# Patient Record
Sex: MALE | Race: WHITE | NOT HISPANIC OR LATINO | Employment: OTHER | URBAN - NONMETROPOLITAN AREA
[De-identification: names, ages, dates, MRNs, and addresses within clinical notes are randomized per-mention and may not be internally consistent; named-entity substitution may affect disease eponyms.]

---

## 2022-05-30 ENCOUNTER — HOSPITAL ENCOUNTER (EMERGENCY)
Facility: HOSPITAL | Age: 77
Discharge: HOME | End: 2022-05-30
Attending: EMERGENCY MEDICINE | Admitting: EMERGENCY MEDICINE
Payer: COMMERCIAL

## 2022-05-30 VITALS
DIASTOLIC BLOOD PRESSURE: 91 MMHG | WEIGHT: 241.4 LBS | HEIGHT: 72 IN | OXYGEN SATURATION: 95 % | SYSTOLIC BLOOD PRESSURE: 141 MMHG | TEMPERATURE: 97.7 F | RESPIRATION RATE: 19 BRPM | BODY MASS INDEX: 32.7 KG/M2 | HEART RATE: 98 BPM

## 2022-05-30 DIAGNOSIS — R10.13 EPIGASTRIC PAIN: Primary | ICD-10-CM

## 2022-05-30 DIAGNOSIS — T18.128A FOOD IMPACTION OF ESOPHAGUS, INITIAL ENCOUNTER: ICD-10-CM

## 2022-05-30 DIAGNOSIS — R73.9 HYPERGLYCEMIA: ICD-10-CM

## 2022-05-30 DIAGNOSIS — E86.0 DEHYDRATION: ICD-10-CM

## 2022-05-30 LAB
ALBUMIN SERPL BCP-MCNC: 3.7 G/DL (ref 3.5–5)
ALP SERPL-CCNC: 103 U/L (ref 46–116)
ALT SERPL W P-5'-P-CCNC: 24 U/L (ref 12–78)
ANION GAP SERPL CALCULATED.3IONS-SCNC: 13 MMOL/L (ref 4–13)
AST SERPL W P-5'-P-CCNC: 15 U/L (ref 5–45)
BASE EX.OXY STD BLDV CALC-SCNC: 85.4 % (ref 60–80)
BASE EXCESS BLDV CALC-SCNC: -3.4 MMOL/L
BASOPHILS # BLD AUTO: 0.1 THOUSANDS/ΜL (ref 0–0.1)
BASOPHILS NFR BLD AUTO: 1 % (ref 0–1)
BILIRUB SERPL-MCNC: 2.28 MG/DL (ref 0.2–1)
BUN SERPL-MCNC: 23 MG/DL (ref 5–25)
CALCIUM SERPL-MCNC: 9.2 MG/DL (ref 8.3–10.1)
CHLORIDE SERPL-SCNC: 98 MMOL/L (ref 100–108)
CO2 SERPL-SCNC: 24 MMOL/L (ref 21–32)
CREAT SERPL-MCNC: 1.27 MG/DL (ref 0.6–1.3)
EOSINOPHIL # BLD AUTO: 0.39 THOUSAND/ΜL (ref 0–0.61)
EOSINOPHIL NFR BLD AUTO: 3 % (ref 0–6)
ERYTHROCYTE [DISTWIDTH] IN BLOOD BY AUTOMATED COUNT: 13.7 % (ref 11.6–15.1)
GFR SERPL CREATININE-BSD FRML MDRD: 54 ML/MIN/1.73SQ M
GLUCOSE SERPL-MCNC: 287 MG/DL (ref 65–140)
GLUCOSE SERPL-MCNC: 327 MG/DL (ref 65–140)
GLUCOSE SERPL-MCNC: 347 MG/DL (ref 65–140)
HCO3 BLDV-SCNC: 21.9 MMOL/L (ref 24–30)
HCT VFR BLD AUTO: 53.6 % (ref 36.5–49.3)
HGB BLD-MCNC: 18.3 G/DL (ref 12–17)
IMM GRANULOCYTES # BLD AUTO: 0.07 THOUSAND/UL (ref 0–0.2)
IMM GRANULOCYTES NFR BLD AUTO: 1 % (ref 0–2)
LACTATE SERPL-SCNC: 2.1 MMOL/L (ref 0.5–2)
LACTATE SERPL-SCNC: 2.7 MMOL/L (ref 0.5–2)
LIPASE SERPL-CCNC: 59 U/L (ref 73–393)
LYMPHOCYTES # BLD AUTO: 2.06 THOUSANDS/ΜL (ref 0.6–4.47)
LYMPHOCYTES NFR BLD AUTO: 18 % (ref 14–44)
MCH RBC QN AUTO: 29.5 PG (ref 26.8–34.3)
MCHC RBC AUTO-ENTMCNC: 34.1 G/DL (ref 31.4–37.4)
MCV RBC AUTO: 87 FL (ref 82–98)
MONOCYTES # BLD AUTO: 0.68 THOUSAND/ΜL (ref 0.17–1.22)
MONOCYTES NFR BLD AUTO: 6 % (ref 4–12)
NEUTROPHILS # BLD AUTO: 8.26 THOUSANDS/ΜL (ref 1.85–7.62)
NEUTS SEG NFR BLD AUTO: 71 % (ref 43–75)
NRBC BLD AUTO-RTO: 0 /100 WBCS
O2 CT BLDV-SCNC: 20.6 ML/DL
PCO2 BLDV: 40.5 MM HG (ref 42–50)
PH BLDV: 7.35 [PH] (ref 7.3–7.4)
PLATELET # BLD AUTO: 264 THOUSANDS/UL (ref 149–390)
PMV BLD AUTO: 10.9 FL (ref 8.9–12.7)
PO2 BLDV: 51.5 MM HG (ref 35–45)
POTASSIUM SERPL-SCNC: 4.2 MMOL/L (ref 3.5–5.3)
PROT SERPL-MCNC: 7.8 G/DL (ref 6.4–8.2)
RBC # BLD AUTO: 6.2 MILLION/UL (ref 3.88–5.62)
SODIUM SERPL-SCNC: 135 MMOL/L (ref 136–145)
WBC # BLD AUTO: 11.56 THOUSAND/UL (ref 4.31–10.16)

## 2022-05-30 PROCEDURE — 83690 ASSAY OF LIPASE: CPT | Performed by: EMERGENCY MEDICINE

## 2022-05-30 PROCEDURE — 80053 COMPREHEN METABOLIC PANEL: CPT | Performed by: EMERGENCY MEDICINE

## 2022-05-30 PROCEDURE — 82805 BLOOD GASES W/O2 SATURATION: CPT | Performed by: EMERGENCY MEDICINE

## 2022-05-30 PROCEDURE — 96375 TX/PRO/DX INJ NEW DRUG ADDON: CPT

## 2022-05-30 PROCEDURE — 96374 THER/PROPH/DIAG INJ IV PUSH: CPT

## 2022-05-30 PROCEDURE — 83605 ASSAY OF LACTIC ACID: CPT | Performed by: EMERGENCY MEDICINE

## 2022-05-30 PROCEDURE — 99283 EMERGENCY DEPT VISIT LOW MDM: CPT

## 2022-05-30 PROCEDURE — 96376 TX/PRO/DX INJ SAME DRUG ADON: CPT

## 2022-05-30 PROCEDURE — 85025 COMPLETE CBC W/AUTO DIFF WBC: CPT | Performed by: EMERGENCY MEDICINE

## 2022-05-30 PROCEDURE — 36415 COLL VENOUS BLD VENIPUNCTURE: CPT | Performed by: EMERGENCY MEDICINE

## 2022-05-30 PROCEDURE — 99284 EMERGENCY DEPT VISIT MOD MDM: CPT | Performed by: EMERGENCY MEDICINE

## 2022-05-30 PROCEDURE — 96361 HYDRATE IV INFUSION ADD-ON: CPT

## 2022-05-30 PROCEDURE — 82948 REAGENT STRIP/BLOOD GLUCOSE: CPT

## 2022-05-30 RX ORDER — ONDANSETRON 4 MG/1
4 TABLET, ORALLY DISINTEGRATING ORAL EVERY 6 HOURS PRN
Qty: 20 TABLET | Refills: 0 | Status: SHIPPED | OUTPATIENT
Start: 2022-05-30

## 2022-05-30 RX ORDER — ONDANSETRON 2 MG/ML
4 INJECTION INTRAMUSCULAR; INTRAVENOUS ONCE
Status: COMPLETED | OUTPATIENT
Start: 2022-05-30 | End: 2022-05-30

## 2022-05-30 RX ORDER — MAGNESIUM HYDROXIDE/ALUMINUM HYDROXICE/SIMETHICONE 120; 1200; 1200 MG/30ML; MG/30ML; MG/30ML
30 SUSPENSION ORAL ONCE
Status: COMPLETED | OUTPATIENT
Start: 2022-05-30 | End: 2022-05-30

## 2022-05-30 RX ORDER — LIDOCAINE HYDROCHLORIDE 20 MG/ML
15 SOLUTION OROPHARYNGEAL ONCE
Status: COMPLETED | OUTPATIENT
Start: 2022-05-30 | End: 2022-05-30

## 2022-05-30 RX ADMIN — INSULIN HUMAN 5 UNITS: 100 INJECTION, SOLUTION PARENTERAL at 20:58

## 2022-05-30 RX ADMIN — ALUMINUM HYDROXIDE, MAGNESIUM HYDROXIDE, AND SIMETHICONE 30 ML: 200; 200; 20 SUSPENSION ORAL at 19:53

## 2022-05-30 RX ADMIN — SODIUM CHLORIDE 1000 ML: 0.9 INJECTION, SOLUTION INTRAVENOUS at 20:30

## 2022-05-30 RX ADMIN — INSULIN HUMAN 5 UNITS: 100 INJECTION, SOLUTION PARENTERAL at 22:24

## 2022-05-30 RX ADMIN — GLUCAGON HYDROCHLORIDE 1 MG: KIT at 20:29

## 2022-05-30 RX ADMIN — ONDANSETRON 4 MG: 2 INJECTION INTRAMUSCULAR; INTRAVENOUS at 19:44

## 2022-05-30 RX ADMIN — LIDOCAINE HYDROCHLORIDE 15 ML: 20 SOLUTION ORAL; TOPICAL at 19:54

## 2022-05-30 RX ADMIN — SODIUM CHLORIDE 1000 ML: 0.9 INJECTION, SOLUTION INTRAVENOUS at 19:35

## 2022-05-30 NOTE — ED PROVIDER NOTES
History  Chief Complaint   Patient presents with    Epigastric Pain     Patient ate chicken yesterday and choked on the food but was able to clear it out  But has been having pain and heart burn since  Cannot eat or drink anything without vomiting, patient has hx of strictures      28-year-old male with past medical history pertinent for esophageal strictures who presents to the emergency department for epigastric pain  Patient reports eating chicken Saturday, choking on the food and states that he was able to clear it but has had heartburn and epigastric pain since then  Reports he cannot eat or drink anything without vomiting  States he has not had food impaction for many years  States he has tried soda, coffee, flavored water, regular water all without success  States he does feel like he has heartburn currently  Reports that the chicken he ate was on Saturday night and was boneless  Denies any chest pain currently  No shortness of breath  Denies any lower abdominal pain  No other concerns  None       Past Medical History:   Diagnosis Date    Diabetes mellitus ([address] )     Hypertension     L2 vertebral fracture (HCC)     Stricture esophagus        Past Surgical History:   Procedure Laterality Date    CHOLECYSTECTOMY      EGD         History reviewed  No pertinent family history  I have reviewed and agree with the history as documented  E-Cigarette/Vaping     E-Cigarette/Vaping Substances    Nicotine No     THC No     CBD No     Flavoring No     Other No     Unknown No      Social History     Tobacco Use    Smoking status: Former Smoker    Smokeless tobacco: Never Used   Substance Use Topics    Alcohol use: Not Currently    Drug use: Never       Review of Systems   Constitutional: Negative for activity change, appetite change, chills, diaphoresis and fever  HENT: Negative for congestion, rhinorrhea and sore throat  Eyes: Negative for visual disturbance     Respiratory: Negative for chest tightness and shortness of breath  Cardiovascular: Negative for chest pain, palpitations and leg swelling  Gastrointestinal: Positive for abdominal pain, nausea and vomiting  Negative for abdominal distention, anal bleeding, blood in stool, constipation, diarrhea and rectal pain  Genitourinary: Negative for difficulty urinating and hematuria  Musculoskeletal: Negative for back pain and neck pain  Skin: Negative for color change and rash  Neurological: Negative for dizziness, weakness and headaches  Psychiatric/Behavioral: Negative for behavioral problems  Physical Exam  Physical Exam  Vitals and nursing note reviewed  Constitutional:       General: He is not in acute distress  Appearance: He is well-developed  He is not diaphoretic  HENT:      Head: Normocephalic and atraumatic  Right Ear: External ear normal       Left Ear: External ear normal       Nose: Nose normal    Eyes:      Pupils: Pupils are equal, round, and reactive to light  Cardiovascular:      Rate and Rhythm: Normal rate and regular rhythm  Pulses: Normal pulses  Heart sounds: Normal heart sounds  Pulmonary:      Effort: Pulmonary effort is normal  No respiratory distress  Breath sounds: Normal breath sounds  No wheezing or rales  Abdominal:      General: Bowel sounds are normal       Palpations: Abdomen is soft  There is no mass  Tenderness: There is no abdominal tenderness  There is no right CVA tenderness or left CVA tenderness  Comments: Negative Darden's sign   Negative McBurney's sign    Musculoskeletal:         General: No tenderness or deformity  Normal range of motion  Cervical back: Normal range of motion and neck supple  Skin:     General: Skin is warm and dry  Capillary Refill: Capillary refill takes less than 2 seconds  Findings: No erythema or rash  Neurological:      General: No focal deficit present  Mental Status: He is alert  Motor: No abnormal muscle tone  Psychiatric:         Behavior: Behavior normal          Vital Signs  ED Triage Vitals [05/30/22 1928]   Temperature Pulse Respirations Blood Pressure SpO2   97 7 °F (36 5 °C) 83 18 (!) 165/101 96 %      Temp Source Heart Rate Source Patient Position - Orthostatic VS BP Location FiO2 (%)   Temporal Monitor Sitting Right arm --      Pain Score       8           Vitals:    05/30/22 1928 05/30/22 2045 05/30/22 2130 05/30/22 2200   BP: (!) 165/101 157/91 152/95 141/91   Pulse: 83 90 96 98   Patient Position - Orthostatic VS: Sitting Sitting Sitting Sitting         Visual Acuity      ED Medications  Medications   ondansetron (ZOFRAN) injection 4 mg (4 mg Intravenous Given 5/30/22 1944)   sodium chloride 0 9 % bolus 1,000 mL (0 mL Intravenous Stopped 5/30/22 2030)   Lidocaine Viscous HCl (XYLOCAINE) 2 % mucosal solution 15 mL (15 mL Swish & Swallow Given 5/30/22 1954)   aluminum-magnesium hydroxide-simethicone (MYLANTA) oral suspension 30 mL (30 mL Oral Given 5/30/22 1953)   glucagon (GLUCAGEN) injection 1 mg (1 mg Intravenous Given 5/30/22 2029)   sodium chloride 0 9 % bolus 1,000 mL (0 mL Intravenous Stopped 5/30/22 2222)   insulin regular (HumuLIN R,NovoLIN R) injection 5 Units (5 Units Intravenous Given 5/30/22 2058)   insulin regular (HumuLIN R,NovoLIN R) injection 5 Units (5 Units Intravenous Given 5/30/22 2224)       Diagnostic Studies  Results Reviewed     Procedure Component Value Units Date/Time    Lactic acid 2 Hours [[id number]]  (Abnormal) Collected: 05/30/22 2155    Lab Status: Final result Specimen: Blood from Arm, Left Updated: 05/30/22 2246     LACTIC ACID 2 1 mmol/L     Narrative:      Result may be elevated if tourniquet was used during collection      Fingerstick Glucose (POCT) [[id number]]  (Abnormal) Collected: 05/30/22 2154    Lab Status: Final result Updated: 05/30/22 2156     POC Glucose 327 mg/dl     Blood gas, venous [[id number]]  (Abnormal) Collected: 05/30/22 2037    Lab Status: Final result Specimen: Blood from Line, Venous Updated: 05/30/22 2053     pH, Kieran 7 351     pCO2, Kieran 40 5 mm Hg      pO2, Kieran 51 5 mm Hg      HCO3, Kieran 21 9 mmol/L      Base Excess, Kieran -3 4 mmol/L      O2 Content, Kieran 20 6 ml/dL      O2 HGB, VENOUS 85 4 %     Lactic acid, plasma [[id number]]  (Abnormal) Collected: 05/30/22 1934    Lab Status: Final result Specimen: Blood from Arm, Left Updated: 05/30/22 2027     LACTIC ACID 2 7 mmol/L     Narrative:      Result may be elevated if tourniquet was used during collection      Comprehensive metabolic panel [[id number]]  (Abnormal) Collected: 05/30/22 1934    Lab Status: Final result Specimen: Blood from Arm, Left Updated: 05/30/22 2011     Sodium 135 mmol/L      Potassium 4 2 mmol/L      Chloride 98 mmol/L      CO2 24 mmol/L      ANION GAP 13 mmol/L      BUN 23 mg/dL      Creatinine 1 27 mg/dL      Glucose 347 mg/dL      Calcium 9 2 mg/dL      AST 15 U/L      ALT 24 U/L      Alkaline Phosphatase 103 U/L      Total Protein 7 8 g/dL      Albumin 3 7 g/dL      Total Bilirubin 2 28 mg/dL      eGFR 54 ml/min/1 73sq m     Narrative:      [place] guidelines for Chronic Kidney Disease (CKD):     Stage 1 with normal or high GFR (GFR > 90 mL/min/1 73 square meters)    Stage 2 Mild CKD (GFR = 60-89 mL/min/1 73 square meters)    Stage 3A Moderate CKD (GFR = 45-59 mL/min/1 73 square meters)    Stage 3B Moderate CKD (GFR = 30-44 mL/min/1 73 square meters)    Stage 4 Severe CKD (GFR = 15-29 mL/min/1 73 square meters)    Stage 5 End Stage CKD (GFR <15 mL/min/1 73 square meters)  Note: GFR calculation is accurate only with a steady state creatinine    Lipase [[id number]]  (Abnormal) Collected: 05/30/22 1934    Lab Status: Final result Specimen: Blood from Arm, Left Updated: 05/30/22 2011     Lipase 59 u/L     CBC and differential [[id number]]  (Abnormal) Collected: 05/30/22 1934    Lab Status: Final result Specimen: Blood from Arm, Left Updated: 05/30/22 1954     WBC 11 56 Thousand/uL      RBC 6 20 Million/uL      Hemoglobin 18 3 g/dL      Hematocrit 53 6 %      MCV 87 fL      MCH 29 5 pg      MCHC 34 1 g/dL      RDW 13 7 %      MPV 10 9 fL      Platelets 354 Thousands/uL      nRBC 0 /100 WBCs      Neutrophils Relative 71 %      Immat GRANS % 1 %      Lymphocytes Relative 18 %      Monocytes Relative 6 %      Eosinophils Relative 3 %      Basophils Relative 1 %      Neutrophils Absolute 8 26 Thousands/µL      Immature Grans Absolute 0 07 Thousand/uL      Lymphocytes Absolute 2 06 Thousands/µL      Monocytes Absolute 0 68 Thousand/µL      Eosinophils Absolute 0 39 Thousand/µL      Basophils Absolute 0 10 Thousands/µL                  No orders to display              Procedures  Procedures         ED Course         MDM  Number of Diagnoses or Management Options  Dehydration  Epigastric pain  Food impaction of esophagus, initial encounter  Hyperglycemia  Diagnosis management comments: 51-year-old male with past medical history pertinent for esophageal strictures who presents to the emergency department for epigastric pain  Patient reports eating chicken yesterday, choking on the food and states that he was able to clear it but has had heartburn and epigastric pain since then  Reports he cannot eat or drink anything without vomiting  Vital signs on arrival here were notable for hypertension with blood pressures in the 160s over 100s  Otherwise vital signs are non concerning  Patient has exam as above  IV fluids ordered along with Zofran and lab work to evaluate for possible causes of patient's epigastric pain and vomiting  Lab work returned showing lactic acid of 2 7, hyperglycemia with glucose at 340s, and leukocytosis at 11 5  Otherwise lab work was unremarkable  Patient was given glucagon to attempt to relieve possible food impaction after patient was given soda and advised to do jumping jacks   Patient discussed with Gastroenterology on call, Dr Tasneem Reilly, who advised "If he is stable and not in any distress (no airway compromise, retching, etc) can keep there and get egd in AM  If he is on distress would transfer " Patient is not in any distress currently  On reassessment after receiving glucagon and GI cocktail, patient reported improvement was able to keep down fluids  Repeat lactic acid was 2 1 and glucose was brought down to 287  Provided his option of staying in the hospital to get EGD in the morning  Patient stated he feels improved and would rather follow up with Gastroenterology outpatient  Referral placed  States he will go home and take his nighttime insulin dose  Understands return precautions  Zofran prescribed         Amount and/or Complexity of Data Reviewed  Clinical lab tests: ordered and reviewed  Tests in the medicine section of CPT®: ordered and reviewed  Decide to obtain previous medical records or to obtain history from someone other than the patient: yes  Obtain history from someone other than the patient: yes  Review and summarize past medical records: yes    Risk of Complications, Morbidity, and/or Mortality  Presenting problems: moderate  Diagnostic procedures: moderate  Management options: moderate        Disposition  Final diagnoses:   Epigastric pain   Food impaction of esophagus, initial encounter - Resolved   Dehydration   Hyperglycemia     Time reflects when diagnosis was documented in both MDM as applicable and the Disposition within this note     Time User Action Codes Description Comment    5/30/2022  8:26 PM Berneta Spring N Add [R10 13] Epigastric pain     5/30/2022  8:26 PM Prerna Kerns Add [G52 539M] Food impaction of esophagus, initial encounter     5/30/2022  8:26 PM Berneta Spring N Add [E86 0] Dehydration     5/30/2022  8:27 PM Berneta Spring N Add [R73 9] Hyperglycemia     5/30/2022  8:39 PM Prerna Kerns Modify [R05 762U] Food impaction of esophagus, initial encounter Resolved      ED Disposition ED Disposition   Discharge    Condition   Stable    Date/Time   Mon May 30, 2022 10:52 PM    Comment   Kris Freeman discharge to home/self care                 Follow-up Information     Follow up With Specialties Details Why Contact Jackie Pena MD Family Medicine   [address] [place] [address]  [phone]      Gastroenterology              Patient's Medications   Discharge Prescriptions    ONDANSETRON (ZOFRAN ODT) 4 MG DISINTEGRATING TABLET    Take 1 tablet (4 mg total) by mouth every 6 (six) hours as needed for nausea or vomiting       Start Date: 5/30/2022 End Date: --       Order Dose: 4 mg       Quantity: 20 tablet    Refills: 0           PDMP Review     None          ED Provider  Electronically Signed by           Radha Ramos MD  05/30/22 9435

## 2022-05-31 NOTE — ED NOTES
Pt reports total relief of symptoms after IV Glucagon and Pepsi  2nd liter of NS infusing  Dr Benoit Hester in to speak with patient  Plan of care discussed  Repeat lactic at 2130 after fluids and accucheck        Kael Travis RN  05/30/22 2039

## 2022-05-31 NOTE — DISCHARGE INSTRUCTIONS
Thank you for letting us take care of you  You have been evaluated for epigastric pain and possible food impaction  Please follow-up with gastroenterology  Please continue a liquid diet until seen by Gastroenterology  Please return if you have worsening symptoms  At this time, you have no clinical evidence of symptoms or problems that will require hospitalization, however you should be evaluated soon by a primary care physician, and contact information has been provided  Follow up with your primary care physician  This is important as many medical conditions can be managed as an outpatient, in addition to routine health screening  Seeing your primary doctor often can help identify changes in the medical issue that brought you to the ED for care today  If you experience any new symptoms or acute worsening of current symptoms, please return to the ED

## 2023-02-13 ENCOUNTER — HOSPITAL ENCOUNTER (INPATIENT)
Facility: HOSPITAL | Age: 78
LOS: 1 days | Discharge: HOME | End: 2023-02-15
Attending: EMERGENCY MEDICINE | Admitting: INTERNAL MEDICINE

## 2023-02-13 DIAGNOSIS — K22.2 ESOPHAGEAL OBSTRUCTION DUE TO FOOD IMPACTION: Primary | ICD-10-CM

## 2023-02-13 DIAGNOSIS — R13.10 DYSPHAGIA, UNSPECIFIED TYPE: ICD-10-CM

## 2023-02-13 DIAGNOSIS — T18.128A ESOPHAGEAL OBSTRUCTION DUE TO FOOD IMPACTION: Primary | ICD-10-CM

## 2023-02-13 DIAGNOSIS — K21.9 GASTROESOPHAGEAL REFLUX DISEASE, UNSPECIFIED WHETHER ESOPHAGITIS PRESENT: ICD-10-CM

## 2023-02-13 LAB
ANION GAP SERPL CALCULATED.3IONS-SCNC: 8 MMOL/L (ref 4–13)
BASOPHILS # BLD AUTO: 0.11 THOUSANDS/ÂΜL (ref 0–0.1)
BASOPHILS NFR BLD AUTO: 1 % (ref 0–1)
BUN SERPL-MCNC: 18 MG/DL (ref 5–25)
CALCIUM SERPL-MCNC: 9.1 MG/DL (ref 8.4–10.2)
CHLORIDE SERPL-SCNC: 103 MMOL/L (ref 96–108)
CO2 SERPL-SCNC: 24 MMOL/L (ref 21–32)
CREAT SERPL-MCNC: 1.1 MG/DL (ref 0.6–1.3)
EOSINOPHIL # BLD AUTO: 0.33 THOUSAND/ÂΜL (ref 0–0.61)
EOSINOPHIL NFR BLD AUTO: 3 % (ref 0–6)
ERYTHROCYTE [DISTWIDTH] IN BLOOD BY AUTOMATED COUNT: 13.6 % (ref 11.6–15.1)
GFR SERPL CREATININE-BSD FRML MDRD: 64 ML/MIN/1.73SQ M
GLUCOSE SERPL-MCNC: 222 MG/DL (ref 65–140)
HCT VFR BLD AUTO: 51.4 % (ref 36.5–49.3)
HGB BLD-MCNC: 17.6 G/DL (ref 12–17)
IMM GRANULOCYTES # BLD AUTO: 0.08 THOUSAND/UL (ref 0–0.2)
IMM GRANULOCYTES NFR BLD AUTO: 1 % (ref 0–2)
LYMPHOCYTES # BLD AUTO: 1.83 THOUSANDS/ÂΜL (ref 0.6–4.47)
LYMPHOCYTES NFR BLD AUTO: 17 % (ref 14–44)
MCH RBC QN AUTO: 28.8 PG (ref 26.8–34.3)
MCHC RBC AUTO-ENTMCNC: 34.2 G/DL (ref 31.4–37.4)
MCV RBC AUTO: 84 FL (ref 82–98)
MONOCYTES # BLD AUTO: 0.57 THOUSAND/ÂΜL (ref 0.17–1.22)
MONOCYTES NFR BLD AUTO: 5 % (ref 4–12)
NEUTROPHILS # BLD AUTO: 7.93 THOUSANDS/ÂΜL (ref 1.85–7.62)
NEUTS SEG NFR BLD AUTO: 73 % (ref 43–75)
NRBC BLD AUTO-RTO: 0 /100 WBCS
PLATELET # BLD AUTO: 206 THOUSANDS/UL (ref 149–390)
PMV BLD AUTO: 10.7 FL (ref 8.9–12.7)
POTASSIUM SERPL-SCNC: 4.9 MMOL/L (ref 3.5–5.3)
RBC # BLD AUTO: 6.12 MILLION/UL (ref 3.88–5.62)
SODIUM SERPL-SCNC: 135 MMOL/L (ref 135–147)
WBC # BLD AUTO: 10.85 THOUSAND/UL (ref 4.31–10.16)

## 2023-02-13 RX ORDER — INSULIN DETEMIR 100 [IU]/ML
72 INJECTION, SOLUTION SUBCUTANEOUS 2 TIMES DAILY
COMMUNITY

## 2023-02-13 RX ORDER — LISINOPRIL 20 MG/1
20 TABLET ORAL DAILY
COMMUNITY

## 2023-02-13 RX ORDER — OMEPRAZOLE 40 MG/1
40 CAPSULE, DELAYED RELEASE ORAL DAILY
Status: ON HOLD | COMMUNITY
Start: 2023-01-20 | End: 2023-02-15 | Stop reason: SDUPTHER

## 2023-02-13 RX ORDER — LEVOTHYROXINE SODIUM 112 UG/1
125 TABLET ORAL DAILY
COMMUNITY

## 2023-02-13 RX ORDER — LOSARTAN POTASSIUM 50 MG/1
1 TABLET ORAL DAILY
COMMUNITY
Start: 2022-12-19

## 2023-02-13 RX ORDER — ONDANSETRON 2 MG/ML
4 INJECTION INTRAMUSCULAR; INTRAVENOUS EVERY 8 HOURS PRN
Status: DISCONTINUED | OUTPATIENT
Start: 2023-02-13 | End: 2023-02-15 | Stop reason: HOSPADM

## 2023-02-13 RX ORDER — PRAVASTATIN SODIUM 20 MG
20 TABLET ORAL
COMMUNITY

## 2023-02-13 RX ORDER — ONDANSETRON 2 MG/ML
4 INJECTION INTRAMUSCULAR; INTRAVENOUS ONCE
Status: COMPLETED | OUTPATIENT
Start: 2023-02-13 | End: 2023-02-13

## 2023-02-13 RX ORDER — INSULIN ASPART 100 [IU]/ML
INJECTION, SOLUTION INTRAVENOUS; SUBCUTANEOUS
COMMUNITY
Start: 2023-01-05

## 2023-02-13 RX ORDER — VERAPAMIL HYDROCHLORIDE 240 MG/1
240 CAPSULE, EXTENDED RELEASE ORAL DAILY
COMMUNITY

## 2023-02-13 RX ORDER — INSULIN GLARGINE 100 [IU]/ML
50 INJECTION, SOLUTION SUBCUTANEOUS 2 TIMES DAILY
Status: ON HOLD | COMMUNITY
End: 2023-02-14

## 2023-02-13 RX ADMIN — ONDANSETRON 4 MG: 2 INJECTION INTRAMUSCULAR; INTRAVENOUS at 18:30

## 2023-02-13 RX ADMIN — GLUCAGON 1 MG: KIT at 19:17

## 2023-02-13 RX ADMIN — GLUCAGON 1 MG: 1 INJECTION, POWDER, LYOPHILIZED, FOR SOLUTION INTRAMUSCULAR; INTRAVENOUS at 18:30

## 2023-02-13 NOTE — ED PROVIDER NOTES
History  Chief Complaint   Patient presents with   • Foreign Body in Throat     Patient reports feeling like there's something stuck in his throat for the past few days, worse since yesterday  Unable to tolerate PO intake today without vomiting  55-year-old male with history of esophageal strictures describes nausea, vomiting with food or fluid ingestion since yesterday around 6:30 PM at [facility] when eating wings and shrimp etc   History of recent similar event and amenable to ED administration of glucagon  Prior has had multiple food impactions and esophageal stricture dilation last most recently about 12 years ago at [facility]  History provided by:  Patient  Foreign Body in Throat  Location:  Swallowed  Suspected object:  Food  Pain severity:  No pain  Timing:  Constant  Progression:  Unchanged  Chronicity:  New  Worsened by:  Eating and drinking  Ineffective treatments:  None tried  Associated symptoms: nausea, trouble swallowing and vomiting    Associated symptoms: no abdominal pain and no difficulty breathing        Prior to Admission Medications   Prescriptions Last Dose Informant Patient Reported?  Taking?   insulin aspart (NovoLOG FlexPen ReliOn) 100 UNIT/ML injection pen   Yes Yes   Sig: USE ICR 1:7 FOR MEALS/SNACK + CORRECTION 1:10 >140 BEFORE MEAL AS DIRECTED (STARTS CORRECTION ) TOTAL DAILY DOSE UP TO 90 UNITS PER DAY   insulin detemir (Levemir FlexTouch) 100 Units/mL injection pen Unknown  Yes No   Sig: Inject 72 Units under the skin 2 (two) times a day   levothyroxine 112 mcg tablet   Yes No   Sig: Take 125 mcg by mouth in the morning   lisinopril (ZESTRIL) 20 mg tablet Unknown  Yes No   Sig: Take 20 mg by mouth in the morning   losartan (COZAAR) 50 mg tablet Unknown  Yes No   Sig: Take 1 tablet by mouth daily   omeprazole (PriLOSEC) 40 MG capsule Unknown  Yes No   Sig: Take 40 mg by mouth in the morning   pravastatin (PRAVACHOL) 20 mg tablet Unknown  Yes No   Sig: Take 20 mg by mouth daily at bedtime   verapamil (VERELAN) 240 MG 24 hr capsule Unknown  Yes No   Sig: Take 240 mg by mouth in the morning      Facility-Administered Medications: None       Past Medical History:   Diagnosis Date   • Diabetes mellitus ([facility] )    • Hypertension    • L2 vertebral fracture ([facility] )    • Stricture esophagus        Past Surgical History:   Procedure Laterality Date   • CHOLECYSTECTOMY     • EGD         History reviewed  No pertinent family history  I have reviewed and agree with the history as documented  E-Cigarette/Vaping     E-Cigarette/Vaping Substances   • Nicotine No    • THC No    • CBD No    • Flavoring No    • Other No    • Unknown No      Social History     Tobacco Use   • Smoking status: Former   • Smokeless tobacco: Never   Substance Use Topics   • Alcohol use: Not Currently   • Drug use: Never       Review of Systems   HENT: Positive for trouble swallowing  Gastrointestinal: Positive for nausea and vomiting  Negative for abdominal pain  All other systems reviewed and are negative  Physical Exam  Physical Exam  Vitals and nursing note reviewed  Constitutional:       General: He is not in acute distress  Appearance: He is obese  He is not toxic-appearing  Comments: Pleasant, comfortable-appearing, conversational, articulate, poor dentition, vomits what appears to be saliva or clear mucus  HENT:      Head: Normocephalic and atraumatic  Mouth/Throat:      Mouth: Mucous membranes are moist       Pharynx: Oropharynx is clear  Eyes:      Conjunctiva/sclera: Conjunctivae normal       Pupils: Pupils are equal, round, and reactive to light  Cardiovascular:      Rate and Rhythm: Normal rate and regular rhythm  Heart sounds: Normal heart sounds  Pulmonary:      Effort: Pulmonary effort is normal       Breath sounds: Normal breath sounds  Abdominal:      General: Bowel sounds are normal  There is no distension  Palpations: Abdomen is soft  Tenderness: There is no abdominal tenderness  Musculoskeletal:         General: No deformity  Cervical back: Neck supple  Skin:     General: Skin is warm and dry  Neurological:      General: No focal deficit present  Mental Status: He is alert and oriented to person, place, and time  Cranial Nerves: No cranial nerve deficit  Coordination: Coordination normal    Psychiatric:         Behavior: Behavior normal          Thought Content:  Thought content normal          Judgment: Judgment normal          Vital Signs  ED Triage Vitals [02/13/23 1755]   Temperature Pulse Respirations Blood Pressure SpO2   97 5 °F (36 4 °C) 94 20 148/79 98 %      Temp Source Heart Rate Source Patient Position - Orthostatic VS BP Location FiO2 (%)   Temporal Monitor Sitting Left arm --      Pain Score       5           Vitals:    02/13/23 1755 02/13/23 2130   BP: 148/79 106/68   Pulse: 94 (!) 110   Patient Position - Orthostatic VS: Sitting          Visual Acuity      ED Medications  Medications   ondansetron (ZOFRAN) injection 4 mg (has no administration in time range)   insulin detemir (LEVEMIR) subcutaneous injection 45 Units (has no administration in time range)   heparin (porcine) subcutaneous injection 5,000 Units (has no administration in time range)   insulin lispro (HumaLOG) 100 units/mL subcutaneous injection 1-6 Units (has no administration in time range)   glucagon (GLUCAGEN) injection 1 mg (1 mg Intravenous Given 2/13/23 1830)   ondansetron (ZOFRAN) injection 4 mg (4 mg Intravenous Given 2/13/23 1830)   glucagon (GLUCAGEN) injection 1 mg (1 mg Intravenous Given 2/13/23 1917)       Diagnostic Studies  Results Reviewed     Procedure Component Value Units Date/Time    Basic metabolic panel [[id number]]  (Abnormal) Collected: 02/13/23 1829    Lab Status: Final result Specimen: Blood from Arm, Right Updated: 02/13/23 1954     Sodium 135 mmol/L      Potassium 4 9 mmol/L      Chloride 103 mmol/L      CO2 24 mmol/L      ANION GAP 8 mmol/L      BUN 18 mg/dL      Creatinine 1 10 mg/dL      Glucose 222 mg/dL      Calcium 9 1 mg/dL      eGFR 64 ml/min/1 73sq m     Narrative:      [place] guidelines for Chronic Kidney Disease (CKD):   •  Stage 1 with normal or high GFR (GFR > 90 mL/min/1 73 square meters)  •  Stage 2 Mild CKD (GFR = 60-89 mL/min/1 73 square meters)  •  Stage 3A Moderate CKD (GFR = 45-59 mL/min/1 73 square meters)  •  Stage 3B Moderate CKD (GFR = 30-44 mL/min/1 73 square meters)  •  Stage 4 Severe CKD (GFR = 15-29 mL/min/1 73 square meters)  •  Stage 5 End Stage CKD (GFR <15 mL/min/1 73 square meters)  Note: GFR calculation is accurate only with a steady state creatinine    CBC and differential [[id number]]  (Abnormal) Collected: 02/13/23 1829    Lab Status: Final result Specimen: Blood from Arm, Right Updated: 02/13/23 1835     WBC 10 85 Thousand/uL      RBC 6 12 Million/uL      Hemoglobin 17 6 g/dL      Hematocrit 51 4 %      MCV 84 fL      MCH 28 8 pg      MCHC 34 2 g/dL      RDW 13 6 %      MPV 10 7 fL      Platelets 451 Thousands/uL      nRBC 0 /100 WBCs      Neutrophils Relative 73 %      Immat GRANS % 1 %      Lymphocytes Relative 17 %      Monocytes Relative 5 %      Eosinophils Relative 3 %      Basophils Relative 1 %      Neutrophils Absolute 7 93 Thousands/µL      Immature Grans Absolute 0 08 Thousand/uL      Lymphocytes Absolute 1 83 Thousands/µL      Monocytes Absolute 0 57 Thousand/µL      Eosinophils Absolute 0 33 Thousand/µL      Basophils Absolute 0 11 Thousands/µL                  XR chest portable    (Results Pending)              Procedures  Procedures         ED Course  ED Course as of 02/14/23 0049   Mon Feb 13, 2023 1836 WBC(!): 10 85   1836 Hemoglobin(!): 17 6   1907 Notes he was able to rest, improved comfort, believes food may have passed   1948 Patient questions  if food impaction cleared, notes he is more comfortable, but discomfort occurs with small amount fluid ingested   2000 We discussed hospitalization and transfer for esophageal food impaction treatment and agreeable with transfer, requests [address] [facility] Mail discussed and requests [facility] admit for [facility] [place]  Patient and son agreeable                                             Medical Decision Making  51-year-old male with prior history of esophageal strictures and food impactions presents with similar symptoms, onset yesterday and undergoes emergency department evaluation, observation and treatment without resolution  Care discussed with on-call gastroenterologist who considers transfer, but patient very stable and comfortable and requests hospitalization at [address] [place] [address] for local GI treatment in the morning  Patient agreeable, son present and supportive  Hospitalist admits  Esophageal obstruction due to food impaction: acute illness or injury  Amount and/or Complexity of Data Reviewed  Labs: ordered  Decision-making details documented in ED Course  Risk  Prescription drug management  Decision regarding hospitalization  Disposition  Final diagnoses:   Esophageal obstruction due to food impaction     Time reflects when diagnosis was documented in both MDM as applicable and the Disposition within this note     Time User Action Codes Description Comment    2/13/2023  7:59 PM Sameera Castellanos [K22 2,  Q39 281L] Esophageal obstruction due to food impaction       ED Disposition     ED Disposition   Admit    Condition   Stable    Date/Time   Mon Feb 13, 2023  8:43 PM    Comment   Case was discussed with Ricky and the patient's admission status was agreed to be Admission Status: inpatient status to the service of Dr Armando Andino             Follow-up Information    None         Current Discharge Medication List      CONTINUE these medications which have NOT CHANGED    Details   insulin aspart (NovoLOG FlexPen ReliOn) 100 UNIT/ML injection pen USE ICR 1:7 FOR MEALS/SNACK + CORRECTION 1:10 >140 BEFORE MEAL AS DIRECTED (STARTS CORRECTION ) TOTAL DAILY DOSE UP TO 90 UNITS PER DAY      insulin detemir (Levemir FlexTouch) 100 Units/mL injection pen Inject 72 Units under the skin 2 (two) times a day      levothyroxine 112 mcg tablet Take 125 mcg by mouth in the morning      lisinopril (ZESTRIL) 20 mg tablet Take 20 mg by mouth in the morning      losartan (COZAAR) 50 mg tablet Take 1 tablet by mouth daily      omeprazole (PriLOSEC) 40 MG capsule Take 40 mg by mouth in the morning      pravastatin (PRAVACHOL) 20 mg tablet Take 20 mg by mouth daily at bedtime      verapamil (VERELAN) 240 MG 24 hr capsule Take 240 mg by mouth in the morning             No discharge procedures on file      PDMP Review     None          ED Provider  Electronically Signed by           Tiffanie Morris DO  02/14/23 9252

## 2023-02-14 ENCOUNTER — ANESTHESIA (OUTPATIENT)
Dept: ANESTHESIOLOGY | Facility: HOSPITAL | Age: 78
End: 2023-02-14

## 2023-02-14 ENCOUNTER — ANESTHESIA EVENT (OUTPATIENT)
Dept: GASTROENTEROLOGY | Facility: HOSPITAL | Age: 78
End: 2023-02-14

## 2023-02-14 ENCOUNTER — ANESTHESIA EVENT (OUTPATIENT)
Dept: ANESTHESIOLOGY | Facility: HOSPITAL | Age: 78
End: 2023-02-14

## 2023-02-14 ENCOUNTER — APPOINTMENT (OUTPATIENT)
Dept: RADIOLOGY | Facility: HOSPITAL | Age: 78
End: 2023-02-14

## 2023-02-14 ENCOUNTER — ANESTHESIA (OUTPATIENT)
Dept: GASTROENTEROLOGY | Facility: HOSPITAL | Age: 78
End: 2023-02-14

## 2023-02-14 ENCOUNTER — APPOINTMENT (OUTPATIENT)
Dept: GASTROENTEROLOGY | Facility: HOSPITAL | Age: 78
End: 2023-02-14
Attending: INTERNAL MEDICINE

## 2023-02-14 PROBLEM — R09.A2 FOREIGN BODY SENSATION IN THROAT: Status: ACTIVE | Noted: 2023-02-14

## 2023-02-14 PROBLEM — R09.89 FOREIGN BODY SENSATION IN THROAT: Status: ACTIVE | Noted: 2023-02-14

## 2023-02-14 LAB
ALBUMIN SERPL BCP-MCNC: 3.8 G/DL (ref 3.5–5)
ALP SERPL-CCNC: 74 U/L (ref 34–104)
ALT SERPL W P-5'-P-CCNC: 11 U/L (ref 7–52)
ANION GAP SERPL CALCULATED.3IONS-SCNC: 8 MMOL/L (ref 4–13)
AST SERPL W P-5'-P-CCNC: 10 U/L (ref 13–39)
BILIRUB SERPL-MCNC: 1.72 MG/DL (ref 0.2–1)
BUN SERPL-MCNC: 28 MG/DL (ref 5–25)
CALCIUM SERPL-MCNC: 9 MG/DL (ref 8.4–10.2)
CHLORIDE SERPL-SCNC: 107 MMOL/L (ref 96–108)
CO2 SERPL-SCNC: 23 MMOL/L (ref 21–32)
CREAT SERPL-MCNC: 1.37 MG/DL (ref 0.6–1.3)
ERYTHROCYTE [DISTWIDTH] IN BLOOD BY AUTOMATED COUNT: 13.8 % (ref 11.6–15.1)
GFR SERPL CREATININE-BSD FRML MDRD: 49 ML/MIN/1.73SQ M
GLUCOSE P FAST SERPL-MCNC: 115 MG/DL (ref 65–99)
GLUCOSE SERPL-MCNC: 106 MG/DL (ref 65–140)
GLUCOSE SERPL-MCNC: 115 MG/DL (ref 65–140)
GLUCOSE SERPL-MCNC: 122 MG/DL (ref 65–140)
GLUCOSE SERPL-MCNC: 123 MG/DL (ref 65–140)
GLUCOSE SERPL-MCNC: 140 MG/DL (ref 65–140)
GLUCOSE SERPL-MCNC: 235 MG/DL (ref 65–140)
GLUCOSE SERPL-MCNC: 58 MG/DL (ref 65–140)
GLUCOSE SERPL-MCNC: 72 MG/DL (ref 65–140)
GLUCOSE SERPL-MCNC: 96 MG/DL (ref 65–140)
HCT VFR BLD AUTO: 50.5 % (ref 36.5–49.3)
HGB BLD-MCNC: 17.3 G/DL (ref 12–17)
MCH RBC QN AUTO: 29 PG (ref 26.8–34.3)
MCHC RBC AUTO-ENTMCNC: 34.3 G/DL (ref 31.4–37.4)
MCV RBC AUTO: 85 FL (ref 82–98)
PLATELET # BLD AUTO: 207 THOUSANDS/UL (ref 149–390)
PMV BLD AUTO: 10.3 FL (ref 8.9–12.7)
POTASSIUM SERPL-SCNC: 3.6 MMOL/L (ref 3.5–5.3)
PROT SERPL-MCNC: 6.7 G/DL (ref 6.4–8.4)
RBC # BLD AUTO: 5.96 MILLION/UL (ref 3.88–5.62)
SODIUM SERPL-SCNC: 138 MMOL/L (ref 135–147)
WBC # BLD AUTO: 10.81 THOUSAND/UL (ref 4.31–10.16)

## 2023-02-14 PROCEDURE — 0DC38ZZ EXTIRPATION OF MATTER FROM LOWER ESOPHAGUS, VIA NATURAL OR ARTIFICIAL OPENING ENDOSCOPIC: ICD-10-PCS | Performed by: INTERNAL MEDICINE

## 2023-02-14 PROCEDURE — 0DB18ZX EXCISION OF UPPER ESOPHAGUS, VIA NATURAL OR ARTIFICIAL OPENING ENDOSCOPIC, DIAGNOSTIC: ICD-10-PCS | Performed by: INTERNAL MEDICINE

## 2023-02-14 PROCEDURE — 0DB38ZX EXCISION OF LOWER ESOPHAGUS, VIA NATURAL OR ARTIFICIAL OPENING ENDOSCOPIC, DIAGNOSTIC: ICD-10-PCS | Performed by: INTERNAL MEDICINE

## 2023-02-14 RX ORDER — HEPARIN SODIUM 5000 [USP'U]/ML
5000 INJECTION, SOLUTION INTRAVENOUS; SUBCUTANEOUS EVERY 8 HOURS SCHEDULED
Status: DISCONTINUED | OUTPATIENT
Start: 2023-02-14 | End: 2023-02-15 | Stop reason: HOSPADM

## 2023-02-14 RX ORDER — PHENYLEPHRINE HCL IN 0.9% NACL 1 MG/10 ML
SYRINGE (ML) INTRAVENOUS AS NEEDED
Status: DISCONTINUED | OUTPATIENT
Start: 2023-02-14 | End: 2023-02-14

## 2023-02-14 RX ORDER — INSULIN LISPRO 100 [IU]/ML
1-6 INJECTION, SOLUTION INTRAVENOUS; SUBCUTANEOUS EVERY 6 HOURS
Status: DISCONTINUED | OUTPATIENT
Start: 2023-02-14 | End: 2023-02-15 | Stop reason: HOSPADM

## 2023-02-14 RX ORDER — ONDANSETRON 2 MG/ML
4 INJECTION INTRAMUSCULAR; INTRAVENOUS ONCE AS NEEDED
Status: DISCONTINUED | OUTPATIENT
Start: 2023-02-14 | End: 2023-02-14 | Stop reason: HOSPADM

## 2023-02-14 RX ORDER — PROPOFOL 10 MG/ML
INJECTION, EMULSION INTRAVENOUS CONTINUOUS PRN
Status: DISCONTINUED | OUTPATIENT
Start: 2023-02-14 | End: 2023-02-14

## 2023-02-14 RX ORDER — ALBUTEROL SULFATE 2.5 MG/3ML
2.5 SOLUTION RESPIRATORY (INHALATION) ONCE AS NEEDED
Status: DISCONTINUED | OUTPATIENT
Start: 2023-02-14 | End: 2023-02-14 | Stop reason: HOSPADM

## 2023-02-14 RX ORDER — FENTANYL CITRATE/PF 50 MCG/ML
25 SYRINGE (ML) INJECTION
Status: DISCONTINUED | OUTPATIENT
Start: 2023-02-14 | End: 2023-02-14 | Stop reason: HOSPADM

## 2023-02-14 RX ORDER — LIDOCAINE HYDROCHLORIDE 10 MG/ML
INJECTION, SOLUTION EPIDURAL; INFILTRATION; INTRACAUDAL; PERINEURAL AS NEEDED
Status: DISCONTINUED | OUTPATIENT
Start: 2023-02-14 | End: 2023-02-14

## 2023-02-14 RX ORDER — SODIUM CHLORIDE, SODIUM LACTATE, POTASSIUM CHLORIDE, CALCIUM CHLORIDE 600; 310; 30; 20 MG/100ML; MG/100ML; MG/100ML; MG/100ML
75 INJECTION, SOLUTION INTRAVENOUS CONTINUOUS
Status: DISCONTINUED | OUTPATIENT
Start: 2023-02-14 | End: 2023-02-15 | Stop reason: HOSPADM

## 2023-02-14 RX ORDER — DEXTROSE MONOHYDRATE 25 G/50ML
25 INJECTION, SOLUTION INTRAVENOUS ONCE
Status: COMPLETED | OUTPATIENT
Start: 2023-02-14 | End: 2023-02-14

## 2023-02-14 RX ORDER — PANTOPRAZOLE SODIUM 40 MG/10ML
40 INJECTION, POWDER, LYOPHILIZED, FOR SOLUTION INTRAVENOUS EVERY 12 HOURS SCHEDULED
Status: DISCONTINUED | OUTPATIENT
Start: 2023-02-14 | End: 2023-02-15 | Stop reason: HOSPADM

## 2023-02-14 RX ORDER — PROPOFOL 10 MG/ML
INJECTION, EMULSION INTRAVENOUS AS NEEDED
Status: DISCONTINUED | OUTPATIENT
Start: 2023-02-14 | End: 2023-02-14

## 2023-02-14 RX ADMIN — INSULIN DETEMIR 45 UNITS: 100 INJECTION, SOLUTION SUBCUTANEOUS at 01:28

## 2023-02-14 RX ADMIN — PANTOPRAZOLE SODIUM 40 MG: 40 INJECTION, POWDER, FOR SOLUTION INTRAVENOUS at 09:57

## 2023-02-14 RX ADMIN — Medication 100 MCG: at 14:42

## 2023-02-14 RX ADMIN — PROPOFOL 50 MG: 10 INJECTION, EMULSION INTRAVENOUS at 14:27

## 2023-02-14 RX ADMIN — Medication 100 MCG: at 14:38

## 2023-02-14 RX ADMIN — PROPOFOL 40 MG: 10 INJECTION, EMULSION INTRAVENOUS at 14:29

## 2023-02-14 RX ADMIN — LIDOCAINE HYDROCHLORIDE 50 MG: 10 INJECTION, SOLUTION EPIDURAL; INFILTRATION; INTRACAUDAL; PERINEURAL at 14:27

## 2023-02-14 RX ADMIN — HEPARIN SODIUM 5000 UNITS: 5000 INJECTION INTRAVENOUS; SUBCUTANEOUS at 06:18

## 2023-02-14 RX ADMIN — PROPOFOL 80 MCG/KG/MIN: 10 INJECTION, EMULSION INTRAVENOUS at 14:27

## 2023-02-14 RX ADMIN — HEPARIN SODIUM 5000 UNITS: 5000 INJECTION INTRAVENOUS; SUBCUTANEOUS at 21:46

## 2023-02-14 RX ADMIN — PANTOPRAZOLE SODIUM 40 MG: 40 INJECTION, POWDER, FOR SOLUTION INTRAVENOUS at 21:46

## 2023-02-14 RX ADMIN — INSULIN LISPRO 3 UNITS: 100 INJECTION, SOLUTION INTRAVENOUS; SUBCUTANEOUS at 18:21

## 2023-02-14 RX ADMIN — DEXTROSE MONOHYDRATE 25 ML: 25 INJECTION, SOLUTION INTRAVENOUS at 13:19

## 2023-02-14 RX ADMIN — Medication 100 MCG: at 14:44

## 2023-02-14 RX ADMIN — SODIUM CHLORIDE, SODIUM LACTATE, POTASSIUM CHLORIDE, AND CALCIUM CHLORIDE 75 ML/HR: .6; .31; .03; .02 INJECTION, SOLUTION INTRAVENOUS at 08:56

## 2023-02-14 NOTE — ANESTHESIA PREPROCEDURE EVALUATION
Procedure:  EGD  Dysphagia/GERD/glbulus sensation for EGD  According to notes has been intolerant to PO intake since Sunday with regurgitation of PO Contents      POC glucose 50s given 1/2 amp D50    HTN - lisinopril, losartan, verapamil   DM-2 poorly controlled A1c 9 6   TATIANA - non compliant     Limited exercise capacity due to back but no dyspnea or chest pain/pressure  No hx stroke/TIA or seizure   Relevant Problems   CARDIO   (+) Hypertension      ENDO   (+) Type 2 diabetes mellitus (HCC)        Physical Exam    Airway    Mallampati score: II  TM Distance: >3 FB  Neck ROM: full     Dental       Cardiovascular      Pulmonary      Other Findings        Anesthesia Plan  ASA Score- 3     Anesthesia Type- IV sedation with anesthesia with ASA Monitors  Additional Monitors:   Airway Plan:           Plan Factors-Exercise tolerance (METS): >4 METS  Chart reviewed  Existing labs reviewed  Patient summary reviewed  Patient is not a current smoker  Obstructive sleep apnea risk education given perioperatively  Induction-     Postoperative Plan-     Informed Consent- Anesthetic plan and risks discussed with patient  I personally reviewed this patient with the CRNA  Discussed and agreed on the Anesthesia Plan with the CRNA  Nhung Ahumada

## 2023-02-14 NOTE — PLAN OF CARE
Problem: MOBILITY - ADULT  Goal: Maintain or return to baseline ADL function  Description: INTERVENTIONS:  -  Assess patient's ability to carry out ADLs; assess patient's baseline for ADL function and identify physical deficits which impact ability to perform ADLs (bathing, care of mouth/teeth, toileting, grooming, dressing, etc )  - Assess/evaluate cause of self-care deficits   - Assess range of motion  - Assess patient's mobility; develop plan if impaired  - Assess patient's need for assistive devices and provide as appropriate  - Encourage maximum independence but intervene and supervise when necessary  - Involve family in performance of ADLs  - Assess for home care needs following discharge   - Consider OT consult to assist with ADL evaluation and planning for discharge  - Provide patient education as appropriate  Outcome: Progressing  Goal: Maintains/Returns to pre admission functional level  Description: INTERVENTIONS:  - Perform BMAT or MOVE assessment daily    - Set and communicate daily mobility goal to care team and patient/family/caregiver  - Collaborate with rehabilitation services on mobility goals if consulted  - Perform Range of Motion 2 times a day  - Reposition patient every 2 hours    - Dangle patient 2 times a day  - Stand patient 2 times a day  - Ambulate patient 2 times a day  - Out of bed to chair 2 times a day   - Out of bed for meals 2 times a day  - Out of bed for toileting  - Record patient progress and toleration of activity level   Outcome: Progressing     Problem: METABOLIC, FLUID AND ELECTROLYTES - ADULT  Goal: Electrolytes maintained within normal limits  Description: INTERVENTIONS:  - Monitor labs and assess patient for signs and symptoms of electrolyte imbalances  - Administer electrolyte replacement as ordered  - Monitor response to electrolyte replacements, including repeat lab results as appropriate  - Instruct patient on fluid and nutrition as appropriate  Outcome: Progressing  Goal: Fluid balance maintained  Description: INTERVENTIONS:  - Monitor labs   - Monitor I/O and WT  - Instruct patient on fluid and nutrition as appropriate  - Assess for signs & symptoms of volume excess or deficit  Outcome: Progressing

## 2023-02-14 NOTE — ANESTHESIA PREPROCEDURE EVALUATION
Procedure:  PRE-OP ONLY    Dysphagia/GERD/glbulus sensation for EGD  According to notes has been intolerant to PO intake since Sunday with regurgitation of PO Contents     HTN - lisinopril, losartan, verapamil   DM-2 poorly controlled A1c 9 6   Relevant Problems   CARDIO   (+) Hypertension      ENDO   (+) Type 2 diabetes mellitus (HCC)             Anesthesia Plan  ASA Score- 3     Anesthesia Type-         Additional Monitors:   Airway Plan:     Comment: GA vs sedation  Plan Factors-    Chart reviewed  Existing labs reviewed  Patient summary reviewed              Induction-     Postoperative Plan-     Informed Consent-
normal...

## 2023-02-14 NOTE — ANESTHESIA POSTPROCEDURE EVALUATION
Post-Op Assessment Note    CV Status:  Stable  Pain Score: 0    Pain management: adequate     Mental Status:  Awake   Hydration Status:  Euvolemic   PONV Controlled:  Controlled   Airway Patency:  Patent      Post Op Vitals Reviewed: Yes      Staff: CRNA         No notable events documented      BP   89/52   Temp      Pulse 92   Resp 18   SpO2 98

## 2023-02-14 NOTE — CONSULTS
Consultation - [facility] at [address] y o  male MRN: [id number]  Unit/Bed#: -01 Encounter: [id number]        Inpatient consult to gastroenterology  Consult performed by: Landy Fletcher MD  Consult ordered by: Makayla Dye, [facility]          Reason for Consult / Principal Problem:     Dysphagia  GERD          ASSESSMENT AND PLAN:      Dysphagia  GERD  -Low suspicion for esophageal obstruction however may have significant reflux related disease  -Differential diagnosis includes GERD, esophageal stricture/web/ring, Peters's esophagus, esophageal mass, dysmotility  -NPO  -PPI BID  -Given the need for hospitalization will perform EGD today for further evaluation +/- dilation    Colon polyps  -Colonoscopy 2018 by Dr Jese Carlson with tubular adenomas repeat recommended in 2020 however this was not completed  -Outpatient follow-up with Dr Jese Carlson for colonoscopy  ______________________________________________________________________    HPI:  Yanely Florez is a/an 68 y o  male seen in consultation for dysphagia and GERD  Patient states that since Sunday he has not been able to eat or drink anything as he feels like does not go down  Has been bringing things back up but no nausea  He is able to tolerate his secretions  He endorses heartburn symptoms which have significantly worsened  He denies abdominal pain denies blood or melena in stool  Denies any diarrhea or constipation  Denies unintentional weight loss  He quit smoking 20 years ago  He endorses a lot of mucus  He reports a remote history of EGD  He is not on any blood thinning agents  Denies excessive NSAIDs  The past medical history includes hypertension, hyperlipidemia, insulin, hypothyroidism  Last colonoscopy 2018 by Dr Jese Carlson with multiple tubular adenomas repeat was recommended in 2 years in 2020 however this has not been completed        REVIEW OF SYSTEMS:    Review of Systems   Constitutional: Negative for unexpected weight change  HENT: Positive for trouble swallowing  Respiratory: Negative for shortness of breath  Cardiovascular: Negative for chest pain  Gastrointestinal: Positive for vomiting  Negative for abdominal pain, blood in stool, constipation, diarrhea and nausea  Genitourinary: Negative for difficulty urinating  Musculoskeletal: Negative for neck stiffness  Skin: Negative for pallor  Neurological: Positive for weakness  Negative for dizziness  Psychiatric/Behavioral: Negative for confusion  Historical Information   Past Medical History:   Diagnosis Date   • Diabetes mellitus ([facility] )    • Hypertension    • L2 vertebral fracture (HCC)    • Stricture esophagus      Past Surgical History:   Procedure Laterality Date   • CHOLECYSTECTOMY     • EGD       Social History   Social History     Substance and Sexual Activity   Alcohol Use Not Currently     Social History     Substance and Sexual Activity   Drug Use Never     Social History     Tobacco Use   Smoking Status Former   Smokeless Tobacco Never     History reviewed  No pertinent family history      Meds/Allergies     Medications Prior to Admission   Medication   • insulin aspart (NovoLOG FlexPen ReliOn) 100 UNIT/ML injection pen   • insulin detemir (Levemir FlexTouch) 100 Units/mL injection pen   • levothyroxine 112 mcg tablet   • lisinopril (ZESTRIL) 20 mg tablet   • losartan (COZAAR) 50 mg tablet   • omeprazole (PriLOSEC) 40 MG capsule   • pravastatin (PRAVACHOL) 20 mg tablet   • verapamil (VERELAN) 240 MG 24 hr capsule     Current Facility-Administered Medications   Medication Dose Route Frequency   • heparin (porcine) subcutaneous injection 5,000 Units  5,000 Units Subcutaneous Q8H [address]   • insulin detemir (LEVEMIR) subcutaneous injection 45 Units  45 Units Subcutaneous BID   • insulin lispro (HumaLOG) 100 units/mL subcutaneous injection 1-6 Units  1-6 Units Subcutaneous Q6H   • ondansetron (ZOFRAN) injection 4 mg  4 mg Intravenous Q8H PRN       No Known Allergies        Objective     Blood pressure 109/72, pulse 88, temperature (!) 97 3 °F (36 3 °C), resp  rate 16, height 6' (1 829 m), weight 105 kg (232 lb 5 8 oz), SpO2 91 %  Body mass index is 31 51 kg/m²  No intake or output data in the 24 hours ending 02/14/23 0815      PHYSICAL EXAM:      Physical Exam  Constitutional:       General: He is not in acute distress  Appearance: He is obese  HENT:      Head: Normocephalic  Eyes:      General: No scleral icterus  Cardiovascular:      Rate and Rhythm: Normal rate  Pulmonary:      Effort: Pulmonary effort is normal    Abdominal:      General: There is no distension  Palpations: Abdomen is soft  Tenderness: There is no abdominal tenderness  There is no guarding  Musculoskeletal:      Cervical back: Neck supple  Skin:     Coloration: Skin is not jaundiced  Neurological:      Mental Status: He is alert and oriented to person, place, and time  Psychiatric:         Mood and Affect: Mood normal              Lab Results:   I have reviewed pertinent labs:  Labs in last 72 hours:   Recent Labs     02/13/23  1829 02/14/23  0500   WBC 10 85* 10 81*   RBC 6 12* 5 96*   HGB 17 6* 17 3*   HCT 51 4* 50 5*    207   RDW 13 6 13 8   NEUTROABS 7 93*  --    SODIUM 135 138   K 4 9 3 6    107   CO2 24 23   BUN 18 28*   CREATININE 1 10 1 37*   GLUC 222* 115   GLUF  --  115*   CALCIUM 9 1 9 0   AST  --  10*   ALT  --  11   ALKPHOS  --  74   TP  --  6 7   ALB  --  3 8   TBILI  --  1 72*          Imaging Studies: I have personally reviewed pertinent imaging studies

## 2023-02-14 NOTE — H&P
[address]  H&P- Jeri Ramos 1945, 68 y o  male MRN: [id number]  Unit/Bed#: -01 Encounter: [id number]  Primary Care Provider: Guadalupe Huddleston MD   Date and time admitted to hospital: 2/13/2023  5:51 PM    * Foreign body sensation in throat  Assessment & Plan  · Presents with feeling of food stuck in throat for the past few days associated with nausea and vomiting  Reports this has happened many years ago,  history of esophageal stricture and dilation at [facility]     · ED provider discussed with GI on call, okay to stay at [facility] and be evaluated in the AM by GI here  · Currently stable on room air, airway patent  · Appreciate GI consult   · Keep strict NPO   · Check XR chest   · Aspiration precautions    Hypertension  Assessment & Plan  · BP stable - hold lisinopril, losartan , verapamil while NPO    Type 2 diabetes mellitus ([address] )  Assessment & Plan  Lab Results   Component Value Date    HGBA1C 9 6 (H) 08/04/2022       No results for input(s): POCGLU in the last 72 hours  Blood Sugar Average: Last 72 hrs:  · Takes Levemir 72 Units BID- will decrease dose while NPO and trend sugars   · Add SSI   · Hypoglycemia protocol       VTE Pharmacologic Prophylaxis: VTE Score: 4 Moderate Risk (Score 3-4) - Pharmacological DVT Prophylaxis Ordered: heparin  Code Status: Level 3 - DNAR and DNI   Discussion with family: Patient declined call to   Anticipated Length of Stay: Patient will be admitted on an observation basis with an anticipated length of stay of less than 2 midnights secondary to foreign body in throat - gi eval , npo for poss procedure  Total Time for Visit, including Counseling / Coordination of Care: 70 minutes Greater than 50% of this total time spent on direct patient counseling and coordination of care      Chief Complaint: foreign body sensation in throat     History of Present Illness:  Jeri Ramos is a 68 y o  male with a PMH of esophageal stricture, hypertension, type 2 diabetes who presents with reports of foreign body sensation in throat for the last several days  Reports feels like "mucus" and has been unable to clear it  Reports associated nausea and vomiting, has been unable to tolerate  p o  intake  Denies eating any different food over the last several days   States this is happened to him in in the past several years ago had to get dilation of esophagus  Denies fevers, chills, chest pain, shortness of breath, abdominal pain  Review of Systems:  Review of Systems   Constitutional: Positive for appetite change  Negative for chills and fever  Respiratory: Negative for cough and shortness of breath  Cardiovascular: Negative for chest pain, palpitations and leg swelling  Gastrointestinal: Positive for nausea and vomiting  Negative for abdominal pain, constipation and diarrhea  Genitourinary: Negative for difficulty urinating  Musculoskeletal: Negative for arthralgias  Neurological: Negative for dizziness, light-headedness and numbness  All other systems reviewed and are negative  Past Medical and Surgical History:   Past Medical History:   Diagnosis Date   • Diabetes mellitus ([facility] [address] )    • Hypertension    • L2 vertebral fracture (HCC)    • Stricture esophagus        Past Surgical History:   Procedure Laterality Date   • CHOLECYSTECTOMY     • EGD         Meds/Allergies:  Prior to Admission medications    Medication Sig Start Date End Date Taking?  Authorizing Provider   insulin aspart (NovoLOG FlexPen ReliOn) 100 UNIT/ML injection pen USE ICR 1:7 FOR MEALS/SNACK + CORRECTION 1:10 >140 BEFORE MEAL AS DIRECTED (STARTS CORRECTION ) TOTAL DAILY DOSE UP TO 90 UNITS PER DAY 1/5/23  Yes Historical Provider, MD   insulin detemir (Levemir FlexTouch) 100 Units/mL injection pen Inject 72 Units under the skin 2 (two) times a day    Historical Provider, MD   levothyroxine 112 mcg tablet Take 125 mcg by mouth in the morning    Historical Provider, MD   lisinopril (ZESTRIL) 20 mg tablet Take 20 mg by mouth in the morning    Historical Provider, MD   losartan (COZAAR) 50 mg tablet Take 1 tablet by mouth daily 12/19/22   Historical Provider, MD   omeprazole (PriLOSEC) 40 MG capsule Take 40 mg by mouth in the morning 1/20/23   Historical Provider, MD   pravastatin (PRAVACHOL) 20 mg tablet Take 20 mg by mouth daily at bedtime    Historical Provider, MD   verapamil (VERELAN) 240 MG 24 hr capsule Take 240 mg by mouth in the morning    Historical Provider, MD   Insulin Glargine Solostar 100 UNIT/ML SOPN Inject 50 Units under the skin 2 (two) times a day  Patient not taking: Reported on 2/13/2023 2/14/23  Historical Provider, MD     I have reviewed home medications with patient personally  Allergies: No Known Allergies    Social History:  Marital Status:    Patient Pre-hospital Living Situation: Home  Patient Pre-hospital Level of Mobility: unable to be assessed at time of evaluation  Patient Pre-hospital Diet Restrictions: none  Substance Use History:   Social History     Substance and Sexual Activity   Alcohol Use Not Currently     Social History     Tobacco Use   Smoking Status Former   Smokeless Tobacco Never     Social History     Substance and Sexual Activity   Drug Use Never       Family History:  History reviewed  No pertinent family history  Physical Exam:     Vitals:   Blood Pressure: 106/68 (02/13/23 2130)  Pulse: (!) 110 (02/13/23 2130)  Temperature: (!) 97 3 °F (36 3 °C) (02/13/23 2130)  Temp Source: Temporal (02/13/23 1755)  Respirations: 17 (02/13/23 2130)  Height: 6' (182 9 cm) (02/13/23 1755)  Weight - Scale: 105 kg (232 lb 5 8 oz) (02/13/23 1755)  SpO2: 93 % (02/13/23 2130)    Physical Exam  Vitals and nursing note reviewed  Constitutional:       General: He is not in acute distress  Appearance: Normal appearance  He is not ill-appearing, toxic-appearing or diaphoretic     HENT:      Head: Normocephalic  Cardiovascular:      Rate and Rhythm: Normal rate and regular rhythm  Pulses: Normal pulses  Heart sounds: Normal heart sounds  Pulmonary:      Effort: Pulmonary effort is normal  No respiratory distress  Breath sounds: No wheezing, rhonchi or rales  Abdominal:      General: Bowel sounds are normal  There is no distension  Palpations: Abdomen is soft  Tenderness: There is no abdominal tenderness  There is no guarding  Musculoskeletal:         General: Normal range of motion  Cervical back: Normal range of motion  Right lower leg: No edema  Left lower leg: No edema  Skin:     General: Skin is warm and dry  Neurological:      General: No focal deficit present  Mental Status: He is alert and oriented to person, place, and time  Additional Data:     Lab Results:  Results from last 7 days   Lab Units 02/13/23  1829   WBC Thousand/uL 10 85*   HEMOGLOBIN g/dL 17 6*   HEMATOCRIT % 51 4*   PLATELETS Thousands/uL 206   NEUTROS PCT % 73   LYMPHS PCT % 17   MONOS PCT % 5   EOS PCT % 3     Results from last 7 days   Lab Units 02/13/23  1829   SODIUM mmol/L 135   POTASSIUM mmol/L 4 9   CHLORIDE mmol/L 103   CO2 mmol/L 24   BUN mg/dL 18   CREATININE mg/dL 1 10   ANION GAP mmol/L 8   CALCIUM mg/dL 9 1   GLUCOSE RANDOM mg/dL 222*                       Lines/Drains:  Invasive Devices     Peripheral Intravenous Line  Duration           Peripheral IV 02/13/23 Right Antecubital <1 day                    Imaging: No pertinent imaging reviewed  XR chest portable    (Results Pending)       EKG and Other Studies Reviewed on Admission:   · EKG: No EKG obtained  ** Please Note: This note has been constructed using a voice recognition system   **

## 2023-02-14 NOTE — PLAN OF CARE
Problem: MOBILITY - ADULT  Goal: Maintain or return to baseline ADL function  Description: INTERVENTIONS:  -  Assess patient's ability to carry out ADLs; assess patient's baseline for ADL function and identify physical deficits which impact ability to perform ADLs (bathing, care of mouth/teeth, toileting, grooming, dressing, etc )  - Assess/evaluate cause of self-care deficits   - Assess range of motion  - Assess patient's mobility; develop plan if impaired  - Assess patient's need for assistive devices and provide as appropriate  - Encourage maximum independence but intervene and supervise when necessary  - Involve family in performance of ADLs  - Assess for home care needs following discharge   - Consider OT consult to assist with ADL evaluation and planning for discharge  - Provide patient education as appropriate  Outcome: Progressing  Goal: Maintains/Returns to pre admission functional level  Description: INTERVENTIONS:  - Perform BMAT or MOVE assessment daily    - Set and communicate daily mobility goal to care team and patient/family/caregiver     - Collaborate with rehabilitation services on mobility goals if consulted  - Out of bed for toileting  - Record patient progress and toleration of activity level   Outcome: Progressing     Problem: METABOLIC, FLUID AND ELECTROLYTES - ADULT  Goal: Electrolytes maintained within normal limits  Description: INTERVENTIONS:  - Monitor labs and assess patient for signs and symptoms of electrolyte imbalances  - Administer electrolyte replacement as ordered  - Monitor response to electrolyte replacements, including repeat lab results as appropriate  - Instruct patient on fluid and nutrition as appropriate  Outcome: Progressing  Goal: Fluid balance maintained  Description: INTERVENTIONS:  - Monitor labs   - Monitor I/O and WT  - Instruct patient on fluid and nutrition as appropriate  - Assess for signs & symptoms of volume excess or deficit  Outcome: Progressing Problem: PAIN - ADULT  Goal: Verbalizes/displays adequate comfort level or baseline comfort level  Description: Interventions:  - Encourage patient to monitor pain and request assistance  - Assess pain using appropriate pain scale  - Administer analgesics based on type and severity of pain and evaluate response  - Implement non-pharmacological measures as appropriate and evaluate response  - Consider cultural and social influences on pain and pain management  - Notify physician/advanced practitioner if interventions unsuccessful or patient reports new pain  Outcome: Progressing     Problem: INFECTION - ADULT  Goal: Absence or prevention of progression during hospitalization  Description: INTERVENTIONS:  - Assess and monitor for signs and symptoms of infection  - Monitor lab/diagnostic results  - Monitor all insertion sites, i e  indwelling lines, tubes, and drains  - Monitor endotracheal if appropriate and nasal secretions for changes in amount and color  - [place] appropriate cooling/warming therapies per order  - Administer medications as ordered  - Instruct and encourage patient and family to use good hand hygiene technique  - Identify and instruct in appropriate isolation precautions for identified infection/condition  Outcome: Progressing     Problem: SAFETY ADULT  Goal: Maintain or return to baseline ADL function  Description: INTERVENTIONS:  -  Assess patient's ability to carry out ADLs; assess patient's baseline for ADL function and identify physical deficits which impact ability to perform ADLs (bathing, care of mouth/teeth, toileting, grooming, dressing, etc )  - Assess/evaluate cause of self-care deficits   - Assess range of motion  - Assess patient's mobility; develop plan if impaired  - Assess patient's need for assistive devices and provide as appropriate  - Encourage maximum independence but intervene and supervise when necessary  - Involve family in performance of ADLs  - Assess for home care needs following discharge   - Consider OT consult to assist with ADL evaluation and planning for discharge  - Provide patient education as appropriate  Outcome: Progressing  Goal: Maintains/Returns to pre admission functional level  Description: INTERVENTIONS:  - Perform BMAT or MOVE assessment daily    - Set and communicate daily mobility goal to care team and patient/family/caregiver     - Collaborate with rehabilitation services on mobility goals if consulted  - Dangle patient 3 times a day  - Stand patient 3 times a day  - Ambulate patient 3 times a day  - Out of bed to chair 3 times a day   - Out of bed for meals 3 times a day  - Out of bed for toileting  - Record patient progress and toleration of activity level   Outcome: Progressing  Goal: Patient will remain free of falls  Description: INTERVENTIONS:  - Educate patient/family on patient safety including physical limitations  - Instruct patient to call for assistance with activity   - Consult OT/PT to assist with strengthening/mobility   - Keep Call bell within reach  - Keep bed low and locked with side rails adjusted as appropriate  - Keep care items and personal belongings within reach  - Initiate and maintain comfort rounds  - Make Fall Risk Sign visible to staff  - Offer Toileting every 2 Hours, in advance of need  - Initiate/Maintain bed alarm  - Obtain necessary fall risk management equipment  - Apply yellow socks and bracelet for high fall risk patients  - Consider moving patient to room near nurses station  Outcome: Progressing     Problem: DISCHARGE PLANNING  Goal: Discharge to home or other facility with appropriate resources  Description: INTERVENTIONS:  - Identify barriers to discharge w/patient and caregiver  - Arrange for needed discharge resources and transportation as appropriate  - Identify discharge learning needs (meds, wound care, etc )  - Arrange for interpretive services to assist at discharge as needed  - Refer to Case Management Department for coordinating discharge planning if the patient needs post-hospital services based on physician/advanced practitioner order or complex needs related to functional status, cognitive ability, or social support system  Outcome: Progressing     Problem: Knowledge Deficit  Goal: Patient/family/caregiver demonstrates understanding of disease process, treatment plan, medications, and discharge instructions  Description: Complete learning assessment and assess knowledge base    Interventions:  - Provide teaching at level of understanding  - Provide teaching via preferred learning methods  Outcome: Progressing

## 2023-02-14 NOTE — ASSESSMENT & PLAN NOTE
· Presents with feeling of food stuck in throat for the past few days associated with nausea and vomiting  Reports this has happened many years ago,  history of esophageal stricture and dilation at [address]     · Currently stable on room air, airway patent     · Appreciate GI consult   · S/p EGD 02/14/23 which showed food bolus causing incomplete distal esophageal obstruction as cause of his sx   · Also appears to be esophagitis for which biopsies were taken   · Seen by speech therapy recommend dysphagia 3 diet/thin liquids which patient has tolerated   · GI recommending omeprazole 40mg BID for 1 month then once daily thereafter   · He will require outpatient EGD in 3 months with his primary gastroenterologist

## 2023-02-14 NOTE — ASSESSMENT & PLAN NOTE
· Presents with feeling of food stuck in throat for the past few days associated with nausea and vomiting  Reports this has happened many years ago,  history of esophageal stricture and dilation at [facility]     · ED provider discussed with GI on call, okay to stay at [address] [place] [address] and be evaluated in the AM by GI here  · Currently stable on room air, airway patent     · Appreciate GI consult   · Keep strict NPO   · Check XR chest   · Aspiration precautions

## 2023-02-14 NOTE — ASSESSMENT & PLAN NOTE
Lab Results   Component Value Date    HGBA1C 9 6 (H) 08/04/2022       No results for input(s): POCGLU in the last 72 hours      Blood Sugar Average: Last 72 hrs:  · Takes Levemir 72 Units BID- will decrease dose while NPO and trend sugars   · Add SSI   · Hypoglycemia protocol

## 2023-02-14 NOTE — UTILIZATION REVIEW
Initial Clinical Review    WAS OBSERVATION 2/13/23 @ 2044 CONVERTED TO INPATIENT ADMISSION 2/14/23 @ 1447 DUE TO CONTINUED STAY REQUIRED TO CARE FOR PATIENT WITH DX: dysphagia / GERD workup and tx  Admission: Date/Time/Statement:   Admission Orders (From admission, onward)     Ordered        02/14/23 1447  Inpatient Admission  Once                      Orders Placed This Encounter   Procedures   • Inpatient Admission     Standing Status:   Standing     Number of Occurrences:   1     Order Specific Question:   Level of Care     Answer:   Med Surg [16]     Order Specific Question:   Estimated length of stay     Answer:   More than 2 Midnights     Order Specific Question:   Certification     Answer:   I certify that inpatient services are medically necessary for this patient for a duration of greater than two midnights  See H&P and MD Progress Notes for additional information about the patient's course of treatment  ED Arrival Information     Expected   -    Arrival   2/13/2023 17:46    Acuity   Urgent            Means of arrival   Walk-In    Escorted by   Family Member    Service   Hospitalist    Admission type   Emergency            Arrival complaint   difficulty swallowing           Chief Complaint   Patient presents with   • Foreign Body in Throat     Patient reports feeling like there's something stuck in his throat for the past few days, worse since yesterday  Unable to tolerate PO intake today without vomiting  Initial Presentation: 68 y o  male to ED via walk-in from home  Present with a PMH of esophageal stricture, hypertension, type 2 diabetes who presents with reports of foreign body sensation in throat for the last several days  Reports feels like "mucus" and has been unable to clear it  Reports associated nausea and vomiting, has been unable to tolerate  p o  intake  Admitted to OBS with DX: Foreign body sensation in throat /   on exam: Tachy ;  WBC 10 85; Gluc 222; Heme A1C 9 6  Given in ED Glucogon iv x2; zofran iv x1  PLAN: GI consult; NPO; aspiration precautions; accu checks w/ ssic        Date: 2/14/23   CHANGED TO INPATIENT  GI CONSULT: Low suspicion for esophageal obstruction however may have significant reflux related disease; Frontal diagnosis includes GERD, esophageal stricture/web/ring, Peters's esophagus, esophageal mass, dysmotility  Plan: NPO; PPI bid; EGD ordered       Date 2/15/23   Day 2 Inpatient (Has surpassed a 2nd midnight with active treatments and services )  S/p EGD 02/14/23 which showed food bolus causing incomplete distal esophageal obstruction as cause of his sx   Also appears to be esophagitis for which biopsies were taken   Cont with difficulty swallowing; speech eval - recom dysphagia 3 diet/thin liquids   Plan: advance diet       ED Triage Vitals [02/13/23 1755]   Temperature Pulse Respirations Blood Pressure SpO2   97 5 °F (36 4 °C) 94 20 148/79 98 %      Temp Source Heart Rate Source Patient Position - Orthostatic VS BP Location FiO2 (%)   Temporal Monitor Sitting Left arm --      Pain Score       5          Wt Readings from Last 1 Encounters:   02/14/23 105 kg (232 lb)     Additional Vital Signs:   Date/Time Temp Pulse Resp BP MAP (mmHg) SpO2 O2 Device Cardiac (WDL) Patient Position - Orthostatic VS   02/15/23 07:50:55 97 5 °F (36 4 °C) 63 16 166/84 111 94 % -- -- Lying       Date/Time Temp Pulse Resp BP MAP (mmHg) SpO2 O2 Device Patient Position - Orthostatic VS   02/14/23 07:51:04 97 3 °F (36 3 °C) Abnormal  88 16 109/72 84 91 % -- --   02/14/23 0100 -- -- -- -- -- -- None (Room air) --   02/13/23 21:30:35 97 3 °F (36 3 °C) Abnormal  110 Abnormal  17 106/68 81 93 % -- --   02/13/23 1755 97 5 °F (36 4 °C) 94 20 148/79 -- 98 % None (Room air) Sitting         EKG: None      Pertinent Labs/Diagnostic Test Results:   XR chest portable   Final Result by Nahum Haney MD (02/14 1242)      No acute consolidation   No congestion Workstation performed: [id number]               Results from last 7 days   Lab Units 02/15/23  0558 02/14/23  0500 02/13/23  1829   WBC Thousand/uL 8 24 10 81* 10 85*   HEMOGLOBIN g/dL 15 6 17 3* 17 6*   HEMATOCRIT % 46 9 50 5* 51 4*   PLATELETS Thousands/uL 161 207 206   NEUTROS ABS Thousands/µL 5 12  --  7 93*         Results from last 7 days   Lab Units 02/15/23  0558 02/14/23  0500 02/13/23  1829   SODIUM mmol/L 134* 138 135   POTASSIUM mmol/L 3 6 3 6 4 9   CHLORIDE mmol/L 105 107 103   CO2 mmol/L 22 23 24   ANION GAP mmol/L 7 8 8   BUN mg/dL 21 28* 18   CREATININE mg/dL 1 08 1 37* 1 10   EGFR ml/min/1 73sq m 65 49 64   CALCIUM mg/dL 8 3* 9 0 9 1     Results from last 7 days   Lab Units 02/14/23  0500   AST U/L 10*   ALT U/L 11   ALK PHOS U/L 74   TOTAL PROTEIN g/dL 6 7   ALBUMIN g/dL 3 8   TOTAL BILIRUBIN mg/dL 1 72*     Results from last 7 days   Lab Units 02/15/23  0615 02/15/23  0007 02/14/23  1820 02/14/23  1631 02/14/23  1348 02/14/23  1309 02/14/23  1152 02/14/23  0749 02/14/23  0620 02/14/23  0131   POC GLUCOSE mg/dl 84 144* 235* 122 123 58* 72 96 106 140     Results from last 7 days   Lab Units 02/15/23  0558 02/14/23  0500 02/13/23  1829   GLUCOSE RANDOM mg/dL 79 115 222*     ED Treatment:   Medication Administration from 02/13/2023 1743 to 02/13/2023 2124       Date/Time Order Dose Route Action     02/13/2023 1830 EST glucagon (GLUCAGEN) injection 1 mg 1 mg Intravenous Given     02/13/2023 1830 EST ondansetron (ZOFRAN) injection 4 mg 4 mg Intravenous Given     02/13/2023 1917 EST glucagon (GLUCAGEN) injection 1 mg 1 mg Intravenous Given            Admitting Diagnosis: Foreign body in throat [T17 208A]  Esophageal obstruction due to food impaction [K22 2, T18 128A]  Age/Sex: 68 y o  male     Admission Orders: SCD's; accu checks w/ ssic; NPO    Scheduled Medications:  heparin (porcine), 5,000 Units, Subcutaneous, Q8H [facility]  insulin lispro, 1-6 Units, Subcutaneous, Q6H  pantoprazole, 40 mg, Intravenous, Q12H [address]      Continuous IV Infusions:  lactated ringers, 75 mL/hr, Intravenous, Continuous      PRN Meds:  ondansetron, 4 mg, Intravenous, Q8H PRN        IP CONSULT TO GASTROENTEROLOGY    Network Utilization Review Department  ATTENTION: Please call with any questions or concerns to [phone] and carefully listen to the prompts so that you are directed to the right person  All voicemails are confidential   Westley Brewer all requests for admission clinical reviews, approved or denied determinations and any other requests to dedicated fax number below belonging to the campus where the patient is receiving treatment   List of dedicated fax numbers for the Facilities:  [address] DENIALS (Administrative/Medical Necessity) [phone]   [address] (Maternity/NICU/Pediatrics) [phone] [address] Melanie Samson [phone]   [facility] [address] [phone]   [address] [address] Bryce Ibarra [facility] 28 [phone]   [address] [facility] [place] [facility] 134 815 [facility] [phone]

## 2023-02-15 VITALS
TEMPERATURE: 97.5 F | BODY MASS INDEX: 31.42 KG/M2 | RESPIRATION RATE: 16 BRPM | DIASTOLIC BLOOD PRESSURE: 84 MMHG | HEIGHT: 72 IN | OXYGEN SATURATION: 94 % | HEART RATE: 63 BPM | WEIGHT: 232 LBS | SYSTOLIC BLOOD PRESSURE: 166 MMHG

## 2023-02-15 LAB
ANION GAP SERPL CALCULATED.3IONS-SCNC: 7 MMOL/L (ref 4–13)
BASOPHILS # BLD AUTO: 0.06 THOUSANDS/ÂΜL (ref 0–0.1)
BASOPHILS NFR BLD AUTO: 1 % (ref 0–1)
BUN SERPL-MCNC: 21 MG/DL (ref 5–25)
CALCIUM SERPL-MCNC: 8.3 MG/DL (ref 8.4–10.2)
CHLORIDE SERPL-SCNC: 105 MMOL/L (ref 96–108)
CO2 SERPL-SCNC: 22 MMOL/L (ref 21–32)
CREAT SERPL-MCNC: 1.08 MG/DL (ref 0.6–1.3)
EOSINOPHIL # BLD AUTO: 0.33 THOUSAND/ÂΜL (ref 0–0.61)
EOSINOPHIL NFR BLD AUTO: 4 % (ref 0–6)
ERYTHROCYTE [DISTWIDTH] IN BLOOD BY AUTOMATED COUNT: 13.9 % (ref 11.6–15.1)
GFR SERPL CREATININE-BSD FRML MDRD: 65 ML/MIN/1.73SQ M
GLUCOSE SERPL-MCNC: 144 MG/DL (ref 65–140)
GLUCOSE SERPL-MCNC: 79 MG/DL (ref 65–140)
GLUCOSE SERPL-MCNC: 84 MG/DL (ref 65–140)
HCT VFR BLD AUTO: 46.9 % (ref 36.5–49.3)
HGB BLD-MCNC: 15.6 G/DL (ref 12–17)
IMM GRANULOCYTES # BLD AUTO: 0.07 THOUSAND/UL (ref 0–0.2)
IMM GRANULOCYTES NFR BLD AUTO: 1 % (ref 0–2)
LYMPHOCYTES # BLD AUTO: 1.86 THOUSANDS/ÂΜL (ref 0.6–4.47)
LYMPHOCYTES NFR BLD AUTO: 23 % (ref 14–44)
MCH RBC QN AUTO: 29.2 PG (ref 26.8–34.3)
MCHC RBC AUTO-ENTMCNC: 33.3 G/DL (ref 31.4–37.4)
MCV RBC AUTO: 88 FL (ref 82–98)
MONOCYTES # BLD AUTO: 0.8 THOUSAND/ÂΜL (ref 0.17–1.22)
MONOCYTES NFR BLD AUTO: 10 % (ref 4–12)
NEUTROPHILS # BLD AUTO: 5.12 THOUSANDS/ÂΜL (ref 1.85–7.62)
NEUTS SEG NFR BLD AUTO: 61 % (ref 43–75)
NRBC BLD AUTO-RTO: 0 /100 WBCS
PLATELET # BLD AUTO: 161 THOUSANDS/UL (ref 149–390)
PMV BLD AUTO: 10.5 FL (ref 8.9–12.7)
POTASSIUM SERPL-SCNC: 3.6 MMOL/L (ref 3.5–5.3)
RBC # BLD AUTO: 5.35 MILLION/UL (ref 3.88–5.62)
SODIUM SERPL-SCNC: 134 MMOL/L (ref 135–147)
WBC # BLD AUTO: 8.24 THOUSAND/UL (ref 4.31–10.16)

## 2023-02-15 RX ORDER — OMEPRAZOLE 40 MG/1
CAPSULE, DELAYED RELEASE ORAL
Qty: 90 CAPSULE | Refills: 0 | Status: SHIPPED | OUTPATIENT
Start: 2023-02-15 | End: 2023-04-16

## 2023-02-15 RX ADMIN — PANTOPRAZOLE SODIUM 40 MG: 40 INJECTION, POWDER, FOR SOLUTION INTRAVENOUS at 08:42

## 2023-02-15 RX ADMIN — SODIUM CHLORIDE, SODIUM LACTATE, POTASSIUM CHLORIDE, AND CALCIUM CHLORIDE 75 ML/HR: .6; .31; .03; .02 INJECTION, SOLUTION INTRAVENOUS at 00:47

## 2023-02-15 RX ADMIN — HEPARIN SODIUM 5000 UNITS: 5000 INJECTION INTRAVENOUS; SUBCUTANEOUS at 06:07

## 2023-02-15 NOTE — NURSING NOTE
Patient discharged home today; IV removed intact, no bleeding noted; patient belongings reviewed and returned; AVS printed and reviewed with patient, patient verbalized understanding; patient transported to exit via wheelchair accompanied by [address]

## 2023-02-15 NOTE — PLAN OF CARE
Problem: MOBILITY - ADULT  Goal: Maintain or return to baseline ADL function  Description: INTERVENTIONS:  -  Assess patient's ability to carry out ADLs; assess patient's baseline for ADL function and identify physical deficits which impact ability to perform ADLs (bathing, care of mouth/teeth, toileting, grooming, dressing, etc )  - Assess/evaluate cause of self-care deficits   - Assess range of motion  - Assess patient's mobility; develop plan if impaired  - Assess patient's need for assistive devices and provide as appropriate  - Encourage maximum independence but intervene and supervise when necessary  - Involve family in performance of ADLs  - Assess for home care needs following discharge   - Consider OT consult to assist with ADL evaluation and planning for discharge  - Provide patient education as appropriate  Outcome: Progressing     Problem: PAIN - ADULT  Goal: Verbalizes/displays adequate comfort level or baseline comfort level  Description: Interventions:  - Encourage patient to monitor pain and request assistance  - Assess pain using appropriate pain scale  - Administer analgesics based on type and severity of pain and evaluate response  - Implement non-pharmacological measures as appropriate and evaluate response  - Consider cultural and social influences on pain and pain management  - Notify physician/advanced practitioner if interventions unsuccessful or patient reports new pain  Outcome: Progressing     Problem: INFECTION - ADULT  Goal: Absence or prevention of progression during hospitalization  Description: INTERVENTIONS:  - Assess and monitor for signs and symptoms of infection  - Monitor lab/diagnostic results  - Monitor all insertion sites, i e  indwelling lines, tubes, and drains  - Monitor endotracheal if appropriate and nasal secretions for changes in amount and color  - [place] appropriate cooling/warming therapies per order  - Administer medications as ordered  - Instruct and encourage patient and family to use good hand hygiene technique  - Identify and instruct in appropriate isolation precautions for identified infection/condition  Outcome: Progressing     Problem: DISCHARGE PLANNING  Goal: Discharge to home or other facility with appropriate resources  Description: INTERVENTIONS:  - Identify barriers to discharge w/patient and caregiver  - Arrange for needed discharge resources and transportation as appropriate  - Identify discharge learning needs (meds, wound care, etc )  - Arrange for interpretive services to assist at discharge as needed  - Refer to Case Management Department for coordinating discharge planning if the patient needs post-hospital services based on physician/advanced practitioner order or complex needs related to functional status, cognitive ability, or social support system  Outcome: Progressing

## 2023-02-15 NOTE — DISCHARGE INSTR - AVS FIRST PAGE
Please take omeprazole 40mg twice daily for 1 month then once daily thereafter   Please call your gastroenterologist to schedule outpatient EGD in 3 months   Try to stick to soft foods as much as possible   Follow up with your primary care provider within 1 week   Return to the ER if you experience recurrence/worsening of symptoms

## 2023-02-15 NOTE — DISCHARGE INSTR - DIET
Dental soft/ thin liquids   Meds whole as tolerated  Ensure upright positioning for all meals and 30 mins after, alternate solids/liquids, take small bites and chew thoroughly

## 2023-02-15 NOTE — PLAN OF CARE
Problem: MOBILITY - ADULT  Goal: Maintain or return to baseline ADL function  Description: INTERVENTIONS:  -  Assess patient's ability to carry out ADLs; assess patient's baseline for ADL function and identify physical deficits which impact ability to perform ADLs (bathing, care of mouth/teeth, toileting, grooming, dressing, etc )  - Assess/evaluate cause of self-care deficits   - Assess range of motion  - Assess patient's mobility; develop plan if impaired  - Assess patient's need for assistive devices and provide as appropriate  - Encourage maximum independence but intervene and supervise when necessary  - Involve family in performance of ADLs  - Assess for home care needs following discharge   - Consider OT consult to assist with ADL evaluation and planning for discharge  - Provide patient education as appropriate  Outcome: Progressing  Goal: Maintains/Returns to pre admission functional level  Description: INTERVENTIONS:  - Perform BMAT or MOVE assessment daily    - Set and communicate daily mobility goal to care team and patient/family/caregiver  - Collaborate with rehabilitation services on mobility goals if consulted  - Perform Range of Motion  times a day  - Reposition patient every  hours    - Dangle patient  times a day  - Stand patient  times a day  - Ambulate patient  times a day  - Out of bed to chair  times a day   - Out of bed for meals times a day  - Out of bed for toileting  - Record patient progress and toleration of activity level   Outcome: Progressing     Problem: METABOLIC, FLUID AND ELECTROLYTES - ADULT  Goal: Electrolytes maintained within normal limits  Description: INTERVENTIONS:  - Monitor labs and assess patient for signs and symptoms of electrolyte imbalances  - Administer electrolyte replacement as ordered  - Monitor response to electrolyte replacements, including repeat lab results as appropriate  - Instruct patient on fluid and nutrition as appropriate  Outcome: Progressing  Goal: Fluid balance maintained  Description: INTERVENTIONS:  - Monitor labs   - Monitor I/O and WT  - Instruct patient on fluid and nutrition as appropriate  - Assess for signs & symptoms of volume excess or deficit  Outcome: Progressing

## 2023-02-15 NOTE — DISCHARGE SUMMARY
[address]  Discharge- Megan Amel 1945, 68 y o  male MRN: [id number]  Unit/Bed#: -01 Encounter: [id number]  Primary Care Provider: Margo Barr MD   Date and time admitted to hospital: 2/13/2023  5:51 PM    * Foreign body sensation in throat  Assessment & Plan  · Presents with feeling of food stuck in throat for the past few days associated with nausea and vomiting  Reports this has happened many years ago,  history of esophageal stricture and dilation at [facility]     · Currently stable on room air, airway patent     · Appreciate GI consult   · S/p EGD 02/14/23 which showed food bolus causing incomplete distal esophageal obstruction as cause of his sx   · Also appears to be esophagitis for which biopsies were taken   · Seen by speech therapy recommend dysphagia 3 diet/thin liquids which patient has tolerated   · GI recommending omeprazole 40mg BID for 1 month then once daily thereafter   · He will require outpatient EGD in 3 months with his primary gastroenterologist    Hypertension  Assessment & Plan  Resume BP meds isinopril, losartan , verapamil at discharge    Type 2 diabetes mellitus [facility])  Assessment & Plan  Lab Results   Component Value Date    HGBA1C 9 6 (H) 08/04/2022       Recent Labs     02/14/23  1631 02/14/23  1820 02/15/23  0007 02/15/23  0615   POCGLU 122 235* 144* 84       Blood Sugar Average: Last 72 hrs:  · (P) 118   · Takes Levemir 72 Units BID  · Sugars low normal due to NPO status  · Hypoglycemia protocol   · Outpatient follow up       Medical Problems     Resolved Problems  Date Reviewed: 2/15/2023   None       Discharging Physician / Practitioner: Kelly Palomo PA-C  PCP: Margo Barr MD  Admission Date:   Admission Orders (From admission, onward)     Ordered        02/14/23 1447  Inpatient Admission  Once            02/13/23 2044  Place in Observation  Once                      Discharge Date: 02/15/23    Consultations During [facility]:  · GI    Procedures Performed:   02/14/23 EGD:   · Food bolus causing incomplete distal esophageal obstruction likely responsible for his symptoms  · LA Class D esophagitis  · Proximal and distal esophageal biopsies taken to rule out EOE  · Normal-appearing stomach on direct and retroflexed views  · Normal-appearing duodenal bulb and visualized second portion of duodenum    Significant Findings / Test Results:   · CXR 02/14/23: no acute consolidation or congestion     Incidental Findings:   · None     Test Results Pending at Discharge (will require follow up): None     Outpatient Tests Requested:  · Outpatient EGD in 3 months   · Follow up with PCP and GI        Complications:  None     Reason for Admission: foreign body sensation in throat     Hospital Course: Radha Smith is a 68 y o  male patient who originally presented to the hospital on 2/13/2023 due to foreign body sensation in the throat for several days  Reported associated nausea and vomiting and has been unable to tolerate p o  intake  Patient was seen by GI and EGD was performed showing food bolus causing incomplete distal esophageal obstruction as cause of his symptoms  His diet was advanced for which tolerated soft diet  Patient is stable for discharge with outpatient follow-up with gastroenterology for EGD in 3 months and omeprazole 40 mg twice daily x1 month and once daily thereafter  Please see above list of diagnoses and related plan for additional information  Condition at Discharge: good    Discharge Day Visit / Exam:   Subjective:  Patient was resting in bed comfortably with no acute complaints; reports that he ate his breakfast without difficulty  Denies any foreign body sensation, pain, nausea, vomiting  He is excited to go home today     Vitals: Blood Pressure: 166/84 (02/15/23 0750)  Pulse: 63 (02/15/23 0750)  Temperature: 97 5 °F (36 4 °C) (02/15/23 0750)  Temp Source: Temporal (02/15/23 0750)  Respirations: 16 (02/15/23 0750)  Height: 6' (182 9 cm) (02/14/23 1308)  Weight - Scale: 105 kg (232 lb) (02/14/23 1308)  SpO2: 94 % (02/15/23 0750)  Exam:   Physical Exam  Constitutional:       General: He is not in acute distress  Appearance: He is obese  HENT:      Mouth/Throat:      Mouth: Mucous membranes are moist    Eyes:      General: No scleral icterus  Cardiovascular:      Rate and Rhythm: Normal rate and regular rhythm  Heart sounds: Normal heart sounds  Pulmonary:      Breath sounds: Normal breath sounds  Abdominal:      General: Abdomen is flat  Bowel sounds are normal       Palpations: Abdomen is soft  Tenderness: There is no abdominal tenderness  Musculoskeletal:      Right lower leg: No edema  Left lower leg: No edema  Skin:     General: Skin is warm  Neurological:      Mental Status: He is alert and oriented to person, place, and time  Psychiatric:         Mood and Affect: Mood normal           Discussion with Family: Patient declined call to   Discharge instructions/Information to patient and family:   See after visit summary for information provided to patient and family  Provisions for Follow-Up Care:  See after visit summary for information related to follow-up care and any pertinent home health orders  Disposition:   Home    Planned Readmission: none      Discharge Statement:  I spent 45 minutes discharging the patient  This time was spent on the day of discharge  I had direct contact with the patient on the day of discharge  Greater than 50% of the total time was spent examining patient, answering all patient questions, arranging and discussing plan of care with patient as well as directly providing post-discharge instructions  Additional time then spent on discharge activities  Discharge Medications:  See after visit summary for reconciled discharge medications provided to patient and/or family        **Please Note: This note may have been constructed using a voice recognition system**

## 2023-02-15 NOTE — ASSESSMENT & PLAN NOTE
Lab Results   Component Value Date    HGBA1C 9 6 (H) 08/04/2022       Recent Labs     02/14/23  1631 02/14/23  1820 02/15/23  0007 02/15/23  0615   POCGLU 122 235* 144* 84       Blood Sugar Average: Last 72 hrs:  · (P) 118   · Takes Levemir 72 Units BID  · Sugars low normal due to NPO status  · Hypoglycemia protocol   · Outpatient follow up

## 2023-02-15 NOTE — SPEECH THERAPY NOTE
Speech Language/Pathology  Speech-Language Pathology Bedside Swallow Evaluation      Patient Name: Margarita Pardo    BVHBL'X Date: 2/15/2023    Summary   Consult received for bedside swallow s/p EGD for foreign body sensation  Pt reports hx of esophageal dilation  EGD completed which revealed food bolus in distal esophagus  Pt denies difficulty swallowing at home  Reports difficulty chewing certain food items d/t lack of dentition  he is currently looking into dental implants  Pt reports when he eats beef/tougher items his esophageal issues get worse  Suspect d/t lack of dentition, pt is unable to adequately break solid boluses down resulting in difficulty passing through esophagus  Education provided on same, pt expressed understanding  Pt presented with functional appearing oral and pharyngeal stage swallowing skills with materials administered today  Pt observed consuming surgical soft/lite breakfast and thin liquids  Suspect extended mastication would be observed w/ regular textures  No overt s/s aspiration w/ solids or liquids and no c/o globus sensation  Pt denies difficulty swallowing pills  Recommend to continue diet per GI/medical team, recommend dental soft diet upon d/c  Discussed recommendations/strategies including softer textures, alteration of liquids/solids and upright posture w/ pt, he expressed understanding  Meds whole as tolerated  Risk/s for Aspiration: Low     Recommended Diet: soft/level 3 diet and thin liquids   Recommended Form of Meds: whole with liquid   Aspiration precautions and swallowing strategies: upright posture, small bites/sips and alternating bites and sips  Other Recommendations: Continue frequent oral care        Current Medical Status  Margarita Pardo is a 68 y o  male with a PMH of esophageal stricture, hypertension, type 2 diabetes who presents with reports of foreign body sensation in throat for the last several days    Reports feels like "mucus" and has been unable to clear it  Reports associated nausea and vomiting, has been unable to tolerate  p o  intake  Denies eating any different food over the last several days   States this is happened to him in in the past several years ago had to get dilation of esophagus  Denies fevers, chills, chest pain, shortness of breath, abdominal pain      Current Precautions: None    Allergies:  No known food allergies    Past medical history:  Please see H&P for details    Special Studies:  CXR:  No acute consolidation  No congestion    EGD  IMPRESSION:  Food bolus causing incomplete distal esophageal obstruction likely responsible for his symptoms  LA Class D esophagitis  Proximal and distal esophageal biopsies taken to rule out EOE  Normal-appearing stomach on direct and retroflexed views  Normal-appearing duodenal bulb and visualized second portion of duodenum     RECOMMENDATION:    Await pathology results     Return to floor for ongoing care as per primary team   Omeprazole 40 mg twice daily for 1 month then reduce to once daily  Full liquids for lunch and if tolerates may advance to soft diet  Recommend repeat EGD in 3 months which can be done by his primary gastroenterologist          Social/Education/Vocational Hx:  Pt lives with family    Swallow Information   Current Risks for Dysphagia & Aspiration: known history of dysphagia  Current Symptoms/Concerns: recent EGD for food impaction  Current Diet: surgical soft/ thins   Baseline Diet: regular diet and thin liquids      Baseline Assessment   Behavior/Cognition: alert  Speech/Language Status: able to participate in conversation  Patient Positioning: upright in bed  Pain Status/Interventions/Response to Interventions:   No report of or nonverbal indications of pain         Swallow Mechanism Exam  Facial: symmetrical  Labial: WFL  Lingual: WFL  Velum: symmetrical  Mandible: adequate ROM  Dentition: limited dentition  Vocal quality:clear/adequate   Volitional Cough: strong/productive   Respiratory Status: on RA         Consistencies Assessed and Performance   Consistencies Administered: thin liquids, mechanical soft solids, soft solids and mixed consistency      Oral Stage: mild  Mastication was prolonged w/ solids, suspect would be extremely prolonged w/ hard solids but not observed this session  Bolus formation and transfer were functional with no significant oral residue noted  No overt s/s reduced oral control  Pharyngeal Stage: WFL  Swallow Mechanics:  Swallowing initiation appeared prompt  Laryngeal rise was palpated and judged to be within functional limits  No coughing, throat clearing, change in vocal quality or respiratory status noted today       Esophageal Concerns: none reported- pt reports when he eats tough items it gets stuck in his chest    Summary and Recommendations (see above)    Results Reviewed with: patient and RN     Treatment Recommended: None at this     Monita Leyden, [phone] [id number] [facility], [place]  2/15/2023

## 2023-02-16 NOTE — UTILIZATION REVIEW
NOTIFICATION OF ADMISSION DISCHARGE   This is a Notification of Discharge from [address]  Please be advised that this patient has been discharge from our facility  Below you will find the admission and discharge date and time including the patient’s disposition  UTILIZATION REVIEW CONTACT:  [address] [place]  Utilization   Network Utilization Review Department  Phone: [phone] x carefully listen to the prompts  All voicemails are confidential   Email: [email or web address]     ADMISSION INFORMATION  PRESENTATION DATE: 2/13/2023  5:51 PM  OBERVATION ADMISSION DATE:   INPATIENT ADMISSION DATE: 2/14/23  2:48 PM   DISCHARGE DATE: 2/15/2023 12:35 PM   DISPOSITION:Home/Self Care    IMPORTANT INFORMATION:  Send all requests for admission clinical reviews, approved or denied determinations and any other requests to dedicated fax number below belonging to the campus where the patient is receiving treatment   List of dedicated fax numbers:  [address] DENIALS (Administrative/Medical Necessity) [phone]   [address] (Maternity/NICU/Pediatrics) [phone]   [facility] [phone]   [place] [address] [phone]   [address] [phone]   220 [facility] [phone] [address] [facility] [phone]   [address] [place] [address] 119 [phone]   [facility]  [phone] [address] [facility] [phone]   [address] [facility] [address] [facility] [phone]

## 2023-08-29 ENCOUNTER — APPOINTMENT (INPATIENT)
Dept: NON INVASIVE DIAGNOSTICS | Facility: HOSPITAL | Age: 78
DRG: 066 | End: 2023-08-29
Payer: COMMERCIAL

## 2023-08-29 ENCOUNTER — HOSPITAL ENCOUNTER (INPATIENT)
Facility: HOSPITAL | Age: 78
LOS: 7 days | DRG: 066 | End: 2023-09-05
Attending: EMERGENCY MEDICINE | Admitting: FAMILY MEDICINE
Payer: COMMERCIAL

## 2023-08-29 ENCOUNTER — APPOINTMENT (EMERGENCY)
Dept: CT IMAGING | Facility: HOSPITAL | Age: 78
DRG: 066 | End: 2023-08-29
Payer: COMMERCIAL

## 2023-08-29 ENCOUNTER — APPOINTMENT (INPATIENT)
Dept: MRI IMAGING | Facility: HOSPITAL | Age: 78
DRG: 066 | End: 2023-08-29
Payer: COMMERCIAL

## 2023-08-29 DIAGNOSIS — R47.1 DYSARTHRIA: ICD-10-CM

## 2023-08-29 DIAGNOSIS — I63.9 STROKE (CEREBRUM) (HCC): Primary | ICD-10-CM

## 2023-08-29 DIAGNOSIS — E83.42 HYPOMAGNESEMIA: ICD-10-CM

## 2023-08-29 DIAGNOSIS — K22.2 ESOPHAGEAL STRICTURE: ICD-10-CM

## 2023-08-29 DIAGNOSIS — I10 HTN (HYPERTENSION): ICD-10-CM

## 2023-08-29 DIAGNOSIS — I71.9 PENETRATING ATHEROSCLEROTIC ULCER OF AORTA (HCC): ICD-10-CM

## 2023-08-29 DIAGNOSIS — E11.65 TYPE 2 DIABETES MELLITUS WITH HYPERGLYCEMIA, WITH LONG-TERM CURRENT USE OF INSULIN (HCC): ICD-10-CM

## 2023-08-29 DIAGNOSIS — K20.0 EOSINOPHILIC ESOPHAGITIS: ICD-10-CM

## 2023-08-29 DIAGNOSIS — Z79.4 TYPE 2 DIABETES MELLITUS WITH HYPERGLYCEMIA, WITH LONG-TERM CURRENT USE OF INSULIN (HCC): ICD-10-CM

## 2023-08-29 PROBLEM — E78.5 HYPERLIPIDEMIA: Status: ACTIVE | Noted: 2023-08-29

## 2023-08-29 PROBLEM — E04.1 THYROID NODULE: Status: ACTIVE | Noted: 2023-08-29

## 2023-08-29 PROBLEM — N18.9 CKD (CHRONIC KIDNEY DISEASE): Status: ACTIVE | Noted: 2023-08-29

## 2023-08-29 PROBLEM — E03.9 HYPOTHYROIDISM: Status: ACTIVE | Noted: 2023-08-29

## 2023-08-29 PROBLEM — E87.1 HYPONATREMIA: Status: ACTIVE | Noted: 2023-08-29

## 2023-08-29 LAB
ALBUMIN SERPL BCP-MCNC: 4.2 G/DL (ref 3.5–5)
ALP SERPL-CCNC: 80 U/L (ref 34–104)
ALT SERPL W P-5'-P-CCNC: 10 U/L (ref 7–52)
ANION GAP SERPL CALCULATED.3IONS-SCNC: 8 MMOL/L
AORTIC ROOT: 4.9 CM
AORTIC VALVE MEAN VELOCITY: 7.1 M/S
APICAL FOUR CHAMBER EJECTION FRACTION: 68 %
APTT PPP: 25 SECONDS (ref 23–37)
ASCENDING AORTA: 3.8 CM
AST SERPL W P-5'-P-CCNC: 18 U/L (ref 13–39)
ATRIAL RATE: 78 BPM
AV AREA BY CONTINUOUS VTI: 8.1 CM2
AV AREA PEAK VELOCITY: 6.5 CM2
AV LVOT MEAN GRADIENT: 2 MMHG
AV LVOT PEAK GRADIENT: 4 MMHG
AV MEAN GRADIENT: 2 MMHG
AV PEAK GRADIENT: 3 MMHG
AV VALVE AREA: 8.07 CM2
AV VELOCITY RATIO: 1.05
BASE EX.OXY STD BLDV CALC-SCNC: 81.7 % (ref 60–80)
BASE EXCESS BLDV CALC-SCNC: -1.3 MMOL/L
BETA-HYDROXYBUTYRATE: 0.2 MMOL/L
BILIRUB SERPL-MCNC: 1.2 MG/DL (ref 0.2–1)
BILIRUB UR QL STRIP: NEGATIVE
BUN SERPL-MCNC: 19 MG/DL (ref 5–25)
CALCIUM SERPL-MCNC: 9.6 MG/DL (ref 8.4–10.2)
CARDIAC TROPONIN I PNL SERPL HS: 5 NG/L
CHLORIDE SERPL-SCNC: 99 MMOL/L (ref 96–108)
CHOLEST SERPL-MCNC: 159 MG/DL
CLARITY UR: CLEAR
CO2 SERPL-SCNC: 24 MMOL/L (ref 21–32)
COLOR UR: YELLOW
CREAT SERPL-MCNC: 1.31 MG/DL (ref 0.6–1.3)
DOP CALC AO PEAK VEL: 0.93 M/S
DOP CALC AO VTI: 18.6 CM
DOP CALC LVOT AREA: 6.15 CM2
DOP CALC LVOT CARDIAC INDEX: 4.55 L/MIN/M2
DOP CALC LVOT CARDIAC OUTPUT: 10.55 L/MIN
DOP CALC LVOT DIAMETER: 2.8 CM
DOP CALC LVOT PEAK VEL VTI: 24.4 CM
DOP CALC LVOT PEAK VEL: 0.98 M/S
DOP CALC LVOT STROKE INDEX: 65.1 ML/M2
DOP CALC LVOT STROKE VOLUME: 150.17
E WAVE DECELERATION TIME: 255 MS
ERYTHROCYTE [DISTWIDTH] IN BLOOD BY AUTOMATED COUNT: 13.4 % (ref 11.6–15.1)
EST. AVERAGE GLUCOSE BLD GHB EST-MCNC: 194 MG/DL
FRACTIONAL SHORTENING: 32 (ref 28–44)
GFR SERPL CREATININE-BSD FRML MDRD: 52 ML/MIN/1.73SQ M
GLUCOSE SERPL-MCNC: 147 MG/DL (ref 65–140)
GLUCOSE SERPL-MCNC: 178 MG/DL (ref 65–140)
GLUCOSE SERPL-MCNC: 223 MG/DL (ref 65–140)
GLUCOSE SERPL-MCNC: 255 MG/DL (ref 65–140)
GLUCOSE SERPL-MCNC: 260 MG/DL (ref 65–140)
GLUCOSE UR STRIP-MCNC: ABNORMAL MG/DL
HBA1C MFR BLD: 8.4 %
HCO3 BLDV-SCNC: 22.9 MMOL/L (ref 24–30)
HCT VFR BLD AUTO: 51.9 % (ref 36.5–49.3)
HDLC SERPL-MCNC: 30 MG/DL
HGB BLD-MCNC: 17.9 G/DL (ref 12–17)
HGB UR QL STRIP.AUTO: NEGATIVE
INR PPP: 0.91 (ref 0.84–1.19)
INTERVENTRICULAR SEPTUM IN DIASTOLE (PARASTERNAL SHORT AXIS VIEW): 1.2 CM
INTERVENTRICULAR SEPTUM: 1.2 CM (ref 0.6–1.1)
KETONES UR STRIP-MCNC: NEGATIVE MG/DL
LDLC SERPL CALC-MCNC: 103 MG/DL (ref 0–100)
LEFT ATRIUM SIZE: 2.9 CM
LEFT INTERNAL DIMENSION IN SYSTOLE: 1.9 CM (ref 2.1–4)
LEFT VENTRICLE DIASTOLIC VOLUME (MOD BIPLANE): 57 ML
LEFT VENTRICLE SYSTOLIC VOLUME (MOD BIPLANE): 18 ML
LEFT VENTRICULAR INTERNAL DIMENSION IN DIASTOLE: 2.8 CM (ref 3.5–6)
LEFT VENTRICULAR POSTERIOR WALL IN END DIASTOLE: 1.3 CM
LEFT VENTRICULAR STROKE VOLUME: 19 ML
LEUKOCYTE ESTERASE UR QL STRIP: NEGATIVE
LV EF: 69 %
LVSV (TEICH): 19 ML
MAGNESIUM SERPL-MCNC: 1.8 MG/DL (ref 1.9–2.7)
MCH RBC QN AUTO: 29.5 PG (ref 26.8–34.3)
MCHC RBC AUTO-ENTMCNC: 34.5 G/DL (ref 31.4–37.4)
MCV RBC AUTO: 86 FL (ref 82–98)
MV E'TISSUE VEL-LAT: 8 CM/S
MV E'TISSUE VEL-SEP: 6 CM/S
MV PEAK A VEL: 0.68 M/S
MV PEAK E VEL: 49 CM/S
MV STENOSIS PRESSURE HALF TIME: 74 MS
MV VALVE AREA P 1/2 METHOD: 2.97
NITRITE UR QL STRIP: NEGATIVE
O2 CT BLDV-SCNC: 21.6 ML/DL
P AXIS: 0 DEGREES
PCO2 BLDV: 37.5 MM HG (ref 42–50)
PH BLDV: 7.4 [PH] (ref 7.3–7.4)
PH UR STRIP.AUTO: 6.5 [PH]
PLATELET # BLD AUTO: 213 THOUSANDS/UL (ref 149–390)
PMV BLD AUTO: 10.4 FL (ref 8.9–12.7)
PO2 BLDV: 46.9 MM HG (ref 35–45)
POTASSIUM SERPL-SCNC: 4.9 MMOL/L (ref 3.5–5.3)
PR INTERVAL: 212 MS
PROT SERPL-MCNC: 7.5 G/DL (ref 6.4–8.4)
PROT UR STRIP-MCNC: NEGATIVE MG/DL
PROTHROMBIN TIME: 12.6 SECONDS (ref 11.6–14.5)
PV PEAK GRADIENT: 2 MMHG
QRS AXIS: 185 DEGREES
QRSD INTERVAL: 128 MS
QT INTERVAL: 390 MS
QTC INTERVAL: 444 MS
RBC # BLD AUTO: 6.07 MILLION/UL (ref 3.88–5.62)
RIGHT ATRIUM AREA SYSTOLE A4C: 14.9 CM2
RIGHT VENTRICLE ID DIMENSION: 3.3 CM
SL CV LV EF: 69
SL CV PED ECHO LEFT VENTRICLE DIASTOLIC VOLUME (MOD BIPLANE) 2D: 30 ML
SL CV PED ECHO LEFT VENTRICLE SYSTOLIC VOLUME (MOD BIPLANE) 2D: 11 ML
SODIUM SERPL-SCNC: 131 MMOL/L (ref 135–147)
SP GR UR STRIP.AUTO: <=1.005 (ref 1–1.03)
T WAVE AXIS: 0 DEGREES
T4 FREE SERPL-MCNC: 1.06 NG/DL (ref 0.61–1.12)
TRICUSPID ANNULAR PLANE SYSTOLIC EXCURSION: 2.1 CM
TRIGL SERPL-MCNC: 132 MG/DL
TSH SERPL DL<=0.05 MIU/L-ACNC: 4.97 UIU/ML (ref 0.45–4.5)
UROBILINOGEN UR QL STRIP.AUTO: 1 E.U./DL
VENTRICULAR RATE: 78 BPM
WBC # BLD AUTO: 6.77 THOUSAND/UL (ref 4.31–10.16)

## 2023-08-29 PROCEDURE — 96361 HYDRATE IV INFUSION ADD-ON: CPT

## 2023-08-29 PROCEDURE — 36415 COLL VENOUS BLD VENIPUNCTURE: CPT | Performed by: EMERGENCY MEDICINE

## 2023-08-29 PROCEDURE — 80061 LIPID PANEL: CPT | Performed by: PHYSICIAN ASSISTANT

## 2023-08-29 PROCEDURE — 99285 EMERGENCY DEPT VISIT HI MDM: CPT

## 2023-08-29 PROCEDURE — 83735 ASSAY OF MAGNESIUM: CPT | Performed by: EMERGENCY MEDICINE

## 2023-08-29 PROCEDURE — C8929 TTE W OR WO FOL WCON,DOPPLER: HCPCS

## 2023-08-29 PROCEDURE — 93005 ELECTROCARDIOGRAM TRACING: CPT

## 2023-08-29 PROCEDURE — 84443 ASSAY THYROID STIM HORMONE: CPT | Performed by: PHYSICIAN ASSISTANT

## 2023-08-29 PROCEDURE — 70496 CT ANGIOGRAPHY HEAD: CPT

## 2023-08-29 PROCEDURE — 85730 THROMBOPLASTIN TIME PARTIAL: CPT | Performed by: EMERGENCY MEDICINE

## 2023-08-29 PROCEDURE — 70498 CT ANGIOGRAPHY NECK: CPT

## 2023-08-29 PROCEDURE — 85610 PROTHROMBIN TIME: CPT | Performed by: EMERGENCY MEDICINE

## 2023-08-29 PROCEDURE — 84439 ASSAY OF FREE THYROXINE: CPT | Performed by: PHYSICIAN ASSISTANT

## 2023-08-29 PROCEDURE — 99222 1ST HOSP IP/OBS MODERATE 55: CPT | Performed by: FAMILY MEDICINE

## 2023-08-29 PROCEDURE — 82948 REAGENT STRIP/BLOOD GLUCOSE: CPT

## 2023-08-29 PROCEDURE — 81003 URINALYSIS AUTO W/O SCOPE: CPT | Performed by: EMERGENCY MEDICINE

## 2023-08-29 PROCEDURE — 70551 MRI BRAIN STEM W/O DYE: CPT

## 2023-08-29 PROCEDURE — 80053 COMPREHEN METABOLIC PANEL: CPT | Performed by: EMERGENCY MEDICINE

## 2023-08-29 PROCEDURE — 99285 EMERGENCY DEPT VISIT HI MDM: CPT | Performed by: EMERGENCY MEDICINE

## 2023-08-29 PROCEDURE — 85027 COMPLETE CBC AUTOMATED: CPT | Performed by: EMERGENCY MEDICINE

## 2023-08-29 PROCEDURE — G1004 CDSM NDSC: HCPCS

## 2023-08-29 PROCEDURE — 82805 BLOOD GASES W/O2 SATURATION: CPT | Performed by: EMERGENCY MEDICINE

## 2023-08-29 PROCEDURE — 96374 THER/PROPH/DIAG INJ IV PUSH: CPT

## 2023-08-29 PROCEDURE — 83036 HEMOGLOBIN GLYCOSYLATED A1C: CPT | Performed by: PHYSICIAN ASSISTANT

## 2023-08-29 PROCEDURE — 93306 TTE W/DOPPLER COMPLETE: CPT | Performed by: INTERNAL MEDICINE

## 2023-08-29 PROCEDURE — 84484 ASSAY OF TROPONIN QUANT: CPT | Performed by: EMERGENCY MEDICINE

## 2023-08-29 PROCEDURE — 82010 KETONE BODYS QUAN: CPT | Performed by: EMERGENCY MEDICINE

## 2023-08-29 PROCEDURE — 93010 ELECTROCARDIOGRAM REPORT: CPT | Performed by: INTERNAL MEDICINE

## 2023-08-29 RX ORDER — PRAVASTATIN SODIUM 20 MG
20 TABLET ORAL
Status: DISCONTINUED | OUTPATIENT
Start: 2023-08-29 | End: 2023-08-29

## 2023-08-29 RX ORDER — MAGNESIUM SULFATE 1 G/100ML
1 INJECTION INTRAVENOUS ONCE
Status: COMPLETED | OUTPATIENT
Start: 2023-08-29 | End: 2023-08-29

## 2023-08-29 RX ORDER — LISINOPRIL 10 MG/1
20 TABLET ORAL DAILY
Status: DISCONTINUED | OUTPATIENT
Start: 2023-08-29 | End: 2023-08-29

## 2023-08-29 RX ORDER — INSULIN LISPRO 100 [IU]/ML
1-6 INJECTION, SOLUTION INTRAVENOUS; SUBCUTANEOUS
Status: DISCONTINUED | OUTPATIENT
Start: 2023-08-29 | End: 2023-08-29

## 2023-08-29 RX ORDER — ASPIRIN 325 MG
325 TABLET ORAL ONCE
Status: COMPLETED | OUTPATIENT
Start: 2023-08-29 | End: 2023-08-29

## 2023-08-29 RX ORDER — INSULIN LISPRO 100 [IU]/ML
1-5 INJECTION, SOLUTION INTRAVENOUS; SUBCUTANEOUS
Status: DISCONTINUED | OUTPATIENT
Start: 2023-08-29 | End: 2023-08-29

## 2023-08-29 RX ORDER — SODIUM CHLORIDE 9 MG/ML
50 INJECTION, SOLUTION INTRAVENOUS CONTINUOUS
Status: DISCONTINUED | OUTPATIENT
Start: 2023-08-29 | End: 2023-08-30

## 2023-08-29 RX ORDER — LOSARTAN POTASSIUM 50 MG/1
50 TABLET ORAL DAILY
Status: DISCONTINUED | OUTPATIENT
Start: 2023-08-29 | End: 2023-09-05 | Stop reason: HOSPADM

## 2023-08-29 RX ORDER — INSULIN LISPRO 100 [IU]/ML
1-6 INJECTION, SOLUTION INTRAVENOUS; SUBCUTANEOUS EVERY 6 HOURS
Status: DISCONTINUED | OUTPATIENT
Start: 2023-08-29 | End: 2023-08-30

## 2023-08-29 RX ORDER — ENOXAPARIN SODIUM 100 MG/ML
40 INJECTION SUBCUTANEOUS DAILY
Status: DISCONTINUED | OUTPATIENT
Start: 2023-08-29 | End: 2023-09-05 | Stop reason: HOSPADM

## 2023-08-29 RX ORDER — LEVOTHYROXINE SODIUM 0.12 MG/1
125 TABLET ORAL DAILY
Status: DISCONTINUED | OUTPATIENT
Start: 2023-08-29 | End: 2023-09-05 | Stop reason: HOSPADM

## 2023-08-29 RX ORDER — ATORVASTATIN CALCIUM 40 MG/1
40 TABLET, FILM COATED ORAL
Status: DISCONTINUED | OUTPATIENT
Start: 2023-08-29 | End: 2023-09-05 | Stop reason: HOSPADM

## 2023-08-29 RX ADMIN — IOHEXOL 85 ML: 350 INJECTION, SOLUTION INTRAVENOUS at 08:48

## 2023-08-29 RX ADMIN — INSULIN LISPRO 3 UNITS: 100 INJECTION, SOLUTION INTRAVENOUS; SUBCUTANEOUS at 12:47

## 2023-08-29 RX ADMIN — PERFLUTREN 2 ML/MIN: 6.52 INJECTION, SUSPENSION INTRAVENOUS at 13:20

## 2023-08-29 RX ADMIN — SODIUM CHLORIDE 50 ML/HR: 0.9 INJECTION, SOLUTION INTRAVENOUS at 12:57

## 2023-08-29 RX ADMIN — INSULIN LISPRO 1 UNITS: 100 INJECTION, SOLUTION INTRAVENOUS; SUBCUTANEOUS at 17:13

## 2023-08-29 RX ADMIN — VERAPAMIL HYDROCHLORIDE 120 MG: 120 TABLET, FILM COATED, EXTENDED RELEASE ORAL at 12:55

## 2023-08-29 RX ADMIN — LISINOPRIL 20 MG: 10 TABLET ORAL at 12:54

## 2023-08-29 RX ADMIN — INSULIN DETEMIR 45 UNITS: 100 INJECTION, SOLUTION SUBCUTANEOUS at 12:49

## 2023-08-29 RX ADMIN — SODIUM CHLORIDE 1000 ML: 0.9 INJECTION, SOLUTION INTRAVENOUS at 08:05

## 2023-08-29 RX ADMIN — LEVOTHYROXINE SODIUM 125 MCG: 125 TABLET ORAL at 12:57

## 2023-08-29 RX ADMIN — INSULIN DETEMIR 20 UNITS: 100 INJECTION, SOLUTION SUBCUTANEOUS at 21:55

## 2023-08-29 RX ADMIN — ENOXAPARIN SODIUM 40 MG: 40 INJECTION SUBCUTANEOUS at 12:52

## 2023-08-29 RX ADMIN — MAGNESIUM SULFATE HEPTAHYDRATE 1 G: 1 INJECTION, SOLUTION INTRAVENOUS at 09:29

## 2023-08-29 RX ADMIN — LOSARTAN POTASSIUM 50 MG: 50 TABLET, FILM COATED ORAL at 12:55

## 2023-08-29 RX ADMIN — ASPIRIN 325 MG ORAL TABLET 325 MG: 325 PILL ORAL at 09:42

## 2023-08-29 NOTE — ASSESSMENT & PLAN NOTE
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

## 2023-08-29 NOTE — ED NOTES
SBAR report called to CLARK Braden in ICU. Asiya informed patient was just taken for MRI and will be transported to him afterwards.       Treasure Weaver RN  08/29/23 9391

## 2023-08-29 NOTE — ASSESSMENT & PLAN NOTE
Lab Results   Component Value Date    HGBA1C 9.4 (H) 05/26/2023       Recent Labs     08/29/23  0753   POCGLU 223*       Blood Sugar Average: Last 72 hrs:  (P) 223     · Poorly controlled evidenced by most recent A1c, follow-up repeat   · Home insulin regimen: Levimir 72 units twice daily + SSI with meals   · Start decreased dose Levimir 45 units twice daily + SSI   · Monitor accuchecks and adjust as needed   · Diabetic diet

## 2023-08-29 NOTE — ASSESSMENT & PLAN NOTE
Na 131 on arrival, component of pseudohyponatremia in the setting of hyperglycemia   · Provide gentle IV fluids with normal saline   · Repeat BMP in AM

## 2023-08-29 NOTE — ASSESSMENT & PLAN NOTE
Presented with 5-7 days history of  slurred speech. Admitted to stroke pathway. 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 · CTA head/neck: Negative for large vessel occlusion, dissection, or aneurysm. 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 · Status post 325 mg ASA on arrival, continue with 81 mg daily and home dose satin   · MRI brain: Small acute juxtacortical white matter infarct within the right posterior frontal lobe, without mass effect or hemorrhagic transformation. Advanced chronic microangiopathic change elsewhere within the brain parenchyma. Chronic microhemorrhages within the left temporal occipital junction and left posterior medial cerebellum. No acute hemorrhage.    · Echocardiogram normal  · , TSH normal  · A1c 8.4. improved from 9.4  · Neurology consult   · PT/OT recommending acute rehab  · Speech recommending GI consult for possible esophageal dilation

## 2023-08-29 NOTE — CASE MANAGEMENT
Case Management Assessment & Discharge Planning Note    Patient name Rufus Hogue  Location /-78 MRN [id number]  : 1945 Date 2023       Current Admission Date: 2023  Current Admission Diagnosis:Acute CVA (cerebrovascular accident) [facility])   Patient Active Problem List    Diagnosis Date Noted   • Acute CVA (cerebrovascular accident) ([address]) 2023   • Hypothyroidism 2023   • CKD (chronic kidney disease) 2023   • Hyponatremia 2023   • Thyroid nodule 2023   • Penetrating atherosclerotic ulcer of aorta ([address]) 2023   • Hyperlipidemia 2023   • Foreign body sensation in throat 2023   • Type 2 diabetes mellitus with hyperglycemia, with long-term current use of insulin ([address])    • Hypertension       LOS (days): 0  Geometric Mean LOS (GMLOS) (days): 1.90  Days to GMLOS:1.5     OBJECTIVE:    Risk of Unplanned Readmission Score: 13.19         Current admission status: Inpatient  Referral Reason: Stroke    Preferred Pharmacy:   [address] [address]  Phone: [phone] Fax: [phone]    Primary Care Provider: Andi Perdomo MD    Primary Insurance: Michael Balderas [facility]  Secondary Insurance:      CM met with patient and family at the bedside,baseline information  was obtained. CM discussed the role of CM in helping the patient develop a discharge plan and assist the patient in carry out their plan. ASSESSMENT:  Active Health Care Proxies    There are no active Health Care Proxies on file.        Advance Directives  Does patient have a [address]?: No  Was patient offered paperwork?: Yes (Left at bedside)  Does patient currently have a Health Care decision maker?: Yes, please see Health Care Proxy section  Does patient have Advance Directives?: No  Was patient offered paperwork?: Yes (Left at bedside)  Primary Contact: Janina Sven - Son         Readmission Root Cause  30 Day Readmission: No    Patient Information  Admitted from[de-identified] Home  Mental Status: Alert  During Assessment patient was accompanied by: Son  Assessment information provided by[de-identified] Patient, Son  Primary Caregiver: Self  Support Systems: Son, Family members, [address] of Residence: [address] [place] [address] do you live in?: [facility] entry access options.  Select all that apply.: Stairs  Number of steps to enter home.: 1 (One step to get in back of home, 11 steps at the front)  Do the steps have railings?: Yes  Type of Current Residence: 2 story home  Upon entering residence, is there a bedroom on the main floor (no further steps)?: No  A bedroom is located on the following floor levels of residence (select all that apply):: 2nd Floor  Upon entering residence, is there a bathroom on the main floor (no further steps)?: No  Indicate which floors of current residence have a bathroom (select all the apply):: 2nd Floor  Number of steps to 2nd floor from main floor: One Flight  In the last 12 months, was there a time when you were not able to pay the mortgage or rent on time?: No  In the last 12 months, how many places have you lived?: 1  In the last 12 months, was there a time when you did not have a steady place to sleep or slept in a shelter (including now)?: No  Living Arrangements: Lives Alone  Is patient a ?: Yes ([address] - 2 years - no VA services other than financial support)  Is patient active with [facility])?: Yes  Is patient service connected?: No   Patient's son Nonda Finder lives next door as well    Activities of Daily Living Prior to Admission  Functional Status: Independent  Completes ADLs independently?: Yes  Ambulates independently?: Yes  Does patient use assisted devices?: Yes  Assisted Devices (DME) used: Straight Cane, Stair Chair/[place]  Does patient currently own DME?: Yes  What DME does the patient currently own?: Judie Cota Stair Chair/[place]  Does patient have a history of Outpatient Therapy (PT/OT)?: No  Does the patient have a history of Short-Term Rehab?: No  Does patient have a history of HHC?: No  Does patient currently have [address]?: No     Patient is interested in an electric reclining chair as patient is struggling to get out of theirs at home and often sleeps downstairs in it. The family has been pursing this prior to hospitalization. Patient Information Continued  Income Source: Pension/senior living  Does patient have prescription coverage?: Yes  Within the past 12 months, you worried that your food would run out before you got the money to buy more.: Never true  Within the past 12 months, the food you bought just didn't last and you didn't have money to get more.: Never true  Food insecurity resource given?: No  Does patient receive dialysis treatments?: No  Does patient have a history of substance abuse?: No  Does patient have a history of Mental Health Diagnosis?: No         Means of Transportation  Means of Transport to Appts[de-identified] Drives Self  In the past 12 months, has lack of transportation kept you from medical appointments or from getting medications?: No  In the past 12 months, has lack of transportation kept you from meetings, work, or from getting things needed for daily living?: No  Was application for public transport provided?: No        DISCHARGE DETAILS:    Discharge planning discussed with[de-identified] Patient, Son - Moises Estrella  Freedom of Choice: Yes  Comments - Freedom of Choice: Patient agreeable to STR or [address] if reccomended. [facility] referrals would be appropraite for both.   CM contacted family/caregiver?: Yes  Were Treatment Team discharge recommendations reviewed with patient/caregiver?: Yes  Did patient/caregiver verbalize understanding of patient care needs?: Yes  Were patient/caregiver advised of the risks associated with not following Treatment Team discharge recommendations?: Yes    Contacts  Patient Contacts: Juma - Son  Relationship to Patient[de-identified] Family  Contact Method: In Person  Reason/Outcome: Continuity of Care, Discharge [address]         Is the patient interested in [facility] at discharge?: No    DME Referral Provided  Referral made for DME?: No    Other Referral/Resources/Interventions Provided:  Interventions: Other (Specify) (Discharge planning)    Would you like to participate in our [facility] service program?  : No - Declined        CM met with patient and son about discharge planning. Patient is agreeable to [facility] or [facility] if recommended. Patient's son Oscar Avitia or Prerna Greer would pick him up when medically stable for discharge. CM to continue to follow patient;s care and discharge needs.

## 2023-08-29 NOTE — TELEMEDICINE
TeleConsultation - Neurology   Donna Acosta 68 y.o. male MRN: [id number]  Unit/Bed#: -01 Encounter: [id number]      This note was initiated by AP but consult completed by attending neurologist. AP provided chart review but was not involved in obtaining history from patient, witnessing/performing physical exam, and/or in decision making and/or management for patient. REQUIRED DOCUMENTATION:     1. This service was provided via Telemedicine. 2. Provider located at [facility]. 3. TeleMed provider: Dr. Cj Post   4. Identify all parties in room with patient during tele consult: Pt's son and daughter-in-law  11. Patient was then informed that this was a Telemedicine visit and that the exam was being conducted confidentially over secure lines. Other methods to assure confidentiality were taken including: No one else was in the room. Patient acknowledged consent and understanding of privacy and security of the Telemedicine visit, and gave us permission to have the assistant stay in the room in order to assist with the history and to conduct the exam.  I informed the patient that I have reviewed their record in Epic and presented the opportunity for them to ask any questions regarding the visit today. The patient agreed to participate. Assessment/Plan     * Acute CVA (cerebrovascular accident) [facility])  Assessment & Plan  68 y.o. left-handed male with hypertension, diabetes mellitus,  esophageal stricture requiring dilation in the past, and prior L2 vertebral fracture, who presented to the ED on 8/29 for evaluation of dysarthria, increased lethargy, and intermittent confusion. Blood pressure on arrival was 217/105. He was afebrile. Other vital signs stable. Initial lab work notable for hyponatremia (131), hypomagnesemia (1.8), elevated creatinine (1.31), and hyperglycemia (255). TSH is elevated. Workup:  CTA head and neck:. No acute pathology noted.   Chronic lacunar infarcts were noted in the right basal ganglia and left cerebellum with severe chronic microangiopathy. 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 Lipid panel completed with , HDL 30. HbA1c 8.4    MRI brain wo: There is an acute juxtacortical white matter infarct within the right posterior frontal lobe. Chronic microhemorrhages are noted within the left temporal occipital junction and left posterior medial cerebellum. Echo: "Left Ventricle: Left ventricular cavity size is small. Wall thickness is mildly increased. There is mild concentric hypertrophy. There is concentric remodeling. The left ventricular ejection fraction is 69% by biplane measurement. . Systolic function is normal. Although no diagnostic regional wall motion abnormality was identified, this possibility cannot be completely excluded on the basis of this study. Diastolic function is mildly abnormal, consistent with grade I (abnormal) relaxation. Left atrial filling pressure is normal.. Augie Lute Left atrium is not well visualized. The atrium is normal in size."    Assessment:  The only apparent stroke symptom is dysarthria. It worsened after a few days, likely due to extension of the stroke, but this worsening was the primary reason he and his family ended up seeking a medical evaluation. The tiredness/lethargy this past week is typical after a stroke, and is also the most likely reason for his intermittent confusion as family reports he is always confused when he first wakes up and he has been taking a lot more naps this week. There was some concern for mild RLE weakness when he first presented, however, this is not a new symptom (uncertain etiology).   Unclear if his dysphagia in the hospital is related to the stroke or to his known esophageal issues (prior strictures, EGD in 2/2023 found a food bolus stuck in his esophagus and biopsies suggestive of eosinophilic esophagitis), with workup ongoing. The stroke is most likely from a central embolic source, likely cardioembolic, with afib the most likely cause. 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 Plan:  - continue aspirin 81mg daily and lipitor 40mg daily for secondary stroke prevention.  - prolonged cardiac monitoring after discharge to monitor for paroxysmal afib, which is a likely etiology of this stroke. - Repeat limited echo to try to obtain better images of the left atrium which was not well visualized on his initial echo. Consider discussion with cardiology regarding this and for setting him up for the aforementioned prolonged cardiac monitoring.  - PT/OT/SLP  - video barium study and possibly GI consult pending for dysphagia evaluation.  - outpatient vascular surgery vs thoracic surgery evaluation of the penetrating atheromatous ulcer  - continue neurochecks. Obtain STAT repeat CT for any significant change in exam and contact neurology. Alm Krabbe will need follow up in in 6 weeks with neurovascular attending or advance practitioner. He will not require outpatient neurological testing. History of Present Illness     Reason for Consult / Principal Problem: Stroke  Hx and PE limited by: inherent limitations of completing a neurologic exam during a telemedicine encounter. HPI: Alm Krabbe is a 68 y.o. left-handed male with hypertension, diabetes mellitus, hypothyroidism, esophageal stricture requiring dilation in the past, and prior L2 vertebral fracture, who presented to the ED on 8/29 for evaluation of dysarthria (word-finding difficulty documented elsewhere in the chart was denied by pt/family), and intermittent onfusion. Symptoms reportedly began about 7 days prior to presenting.   Initially speech was only mildly slurred only with some words, however, a few days ago, the dysarthria worsened and became constant. On exam in the ED, patient reportedly had some weakness in the right lower extremity with drift, which family believes is not a new problem. He was also confused about his location (he told reported that he was in the town [place] to [place], but this is a chronic mistake he always makes according to family) and had some dysarthria. Blood pressure on arrival was 217/105. He was afebrile. Other vital signs stable. Lab work notable for hyponatremia (131), hypomagnesemia (1.8), elevated creatinine (1.31), and hyperglycemia (255). TSH is elevated. CTA head and neck performed. No acute pathology noted. Chronic lacunar infarcts were noted in the right basal ganglia and left cerebellum with severe chronic microangiopathy. 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 There is also a 2.2 cm penetrating atheromatous ulcer along the lateral wall of the aortic arch, which on my review appears to be just distal to the origin of the cervical vessels. Vascular surgery follow-up recommended. Lipid panel completed with , HDL 30. HbA1c 8.4    MRI brain was completed. There is an acute juxtacortical white matter infarct within the right posterior frontal lobe. Chronic microhemorrhages are noted within the left temporal occipital junction and left posterior medial cerebellum. The above information was primarily compiled by Cortney Collins PA-C based on chart review, and has been updated as needed based on additional workup and changes in the history after speaking with the patient and his family. Pt reports that he woke up with slurred speech about a week prior to presenting on 8/29, and that his speech has been consistently slurred since that time.   However, his son at bedside, initially he was only slurring a word here and there, but it worsened over the week and became constant. The last 2-3 days before coming in it was nearly constant. They initially thought that his dysarthria was more related to blood sugar fluctuations, which has happened somewhat similiarly in the past. He seemed to have periods of confusion, including occasionally having difficulty figuring out his insulin. However, this often happens when he first wakes up normally, so they thought that it was just that, and it did not stand out too much as he was also more tired this last week (expected after a stroke). He had noticed that his right leg was weaker than the left, but that has been the case for a while now, and hasn't notice any recent changes. No other symptoms that they are aware of in the last week, just the slurred speech. No clear choking/aspiration at home, but he has had some difficulty swallowing in the hospital.  He does have a history of esophageal strictures requiring treatment in the past.    His wife passed away in 2019, and he wasn't taking good care of his health, especially his diabetes since then. This has been much better the last 6 months once his family became aware of his difficulty. Family has been helping with his meal preparation and medication management since then. He has not run out of any medications recently that family is aware of. He was not on aspirin prior to this hospitalization. He was on it in the past, but told to stop it after guidelines changed a few years ago. He normally uses a cane to assist with ambulation. He had a fall about a month ago without any significant injury, and does not fall frequently. Inpatient consult to Neurology  Consult performed by: Leena Olivier MD  Consult ordered by: John Tong PA-C    Consult to Neurology  Consult performed by: Leena Olivier MD  Consult ordered by: Guero Bautista DO           Review of Systems As above.  10 point ROS completed, and otherwise unremarkable and not pertinent to the HPI.        Historical Information   Past Medical History:   Diagnosis Date   • Diabetes mellitus ([address])    • Hypertension    • L2 vertebral fracture (HCC)    • Stricture esophagus      Past Surgical History:   Procedure Laterality Date   • CATARACT EXTRACTION Bilateral    • CHOLECYSTECTOMY     • EGD       Social History   Social History     Substance and Sexual Activity   Alcohol Use Not Currently     Social History     Substance and Sexual Activity   Drug Use Never     E-Cigarette/Vaping   • E-Cigarette Use Never User      E-Cigarette/Vaping Substances   • Nicotine No    • THC No    • CBD No    • Flavoring No    • Other No    • Unknown No      Social History     Tobacco Use   Smoking Status Former   Smokeless Tobacco Never     Family History: History reviewed. No pertinent family history. Review of previous medical records was completed. Meds/Allergies   all current active meds have been reviewed, current meds:   Current Facility-Administered Medications   Medication Dose Route Frequency   • aspirin (ECOTRIN LOW STRENGTH) EC tablet 81 mg  81 mg Oral Daily   • atorvastatin (LIPITOR) tablet 40 mg  40 mg Oral Daily With Dinner   • enoxaparin (LOVENOX) subcutaneous injection 40 mg  40 mg Subcutaneous Daily   • insulin detemir (LEVEMIR) subcutaneous injection 20 Units  20 Units Subcutaneous Q12H [address]   • insulin lispro (HumaLOG) 100 units/mL subcutaneous injection 1-6 Units  1-6 Units Subcutaneous 4x Daily (AC & HS)   • levothyroxine tablet 125 mcg  125 mcg Oral Daily   • losartan (COZAAR) tablet 50 mg  50 mg Oral Daily   • verapamil (CALAN-SR) CR tablet 120 mg  120 mg Oral Daily    and PTA meds:   Prior to Admission Medications   Prescriptions Last Dose Informant Patient Reported?  Taking?   insulin aspart (NovoLOG FlexPen) 100 UNIT/ML injection pen 8/28/2023  Yes Yes   Sig: USE ICR 1:7 FOR MEALS/SNACK + CORRECTION 1:10 >140 BEFORE MEAL AS DIRECTED (STARTS CORRECTION ) TOTAL DAILY DOSE UP TO 90 UNITS PER DAY insulin detemir (Levemir FlexTouch) 100 Units/mL injection pen 8/28/2023  Yes Yes   Sig: Inject 70 Units under the skin daily   levothyroxine 112 mcg tablet 8/28/2023  Yes Yes   Sig: Take 125 mcg by mouth in the morning   lisinopril (ZESTRIL) 20 mg tablet 8/28/2023  Yes Yes   Sig: Take 20 mg by mouth in the morning   losartan (COZAAR) 50 mg tablet 8/28/2023  Yes Yes   Sig: Take 1 tablet by mouth daily   omeprazole (PriLOSEC) 40 MG capsule   No No   Sig: Take 1 capsule (40 mg total) by mouth 2 (two) times a day for 30 days, THEN 1 capsule (40 mg total) daily. pravastatin (PRAVACHOL) 20 mg tablet Not Taking  Yes No   Sig: Take 20 mg by mouth daily at bedtime   Patient not taking: Reported on 8/29/2023   verapamil (VERELAN) 240 MG 24 hr capsule Not Taking  Yes No   Sig: Take 240 mg by mouth in the morning   Patient not taking: Reported on 8/29/2023      Facility-Administered Medications: None       No Known Allergies    Objective   Vitals:  Temp:  [97.9 °F (36.6 °C)-98.5 °F (36.9 °C)] 97.9 °F (36.6 °C)  HR:  [54-95] 74  Resp:  [18-29] 19  BP: (101-162)/(59-89) 101/61  SpO2:  [93 %-97 %] 95 %      Exam completed via video:  GENERAL PHYSICAL EXAMINATION   Constitutional: No acute distress. Pleasant, well-groomed. ENMT:  Dentition is quite poor. Pulmonary: normal respiratory effort. Musculoskeletal: Neck with somewhat limited ROM in all directions. See below. Skin: There are no rashes or lesions noted. Psych: See mental status below     NEUROLOGICAL EXAMINATION   Mental Status: Mood and affect normal, alert and oriented to person, place and time. Knows the identity of the President of the [facility]. Able to provide some history, but details largely provided by his son, somewhat because of his dysarthria making it hard to understand him. Language: Spontaneous & fluent with good comprehension, normal object naming, able to follow commands.  Had a difficult time describing the cookie theft picture, but this seemed to be more related to visual acuity than language difficulty. Cranial Nerves:   II, III: no gross visual field defects apparent on observation, but does seem to have impaired acuity. III, IV, VI: Extraocular movements intact. No nystagmus, no ptosis. VII: Mild facial asymmetry, appears to droop mildly on the left at rest and also appears to be slightly weaker when smiling (family does not appreciate any change from baseline). No weakness of orbicularis oculi or orbicularis oris. VIII: Hearing intact to voice. IX, X: moderate to severe dysarthria, difficult to understand at times. XI: Full and symmetric ROM in trapezius (shoulder shrug) & somewhat limited ROM in sternocleidomastoid bilaterally. XII: Tongue is midline with symmetric movement. There is no tongue atrophy or fasciculation. Motor examination: There is no apparent atrophy or focal weakness. No downward or pronator drift in the UEs, mild downward drift in B/L LEs. Fine motor movements are intact slightly worse on the right but he is left handed. No fasciculations or spontaneous muscle movements are noted. Coordination: Finger to nose and heel to shin intact B/L without dysmetria. There is no definite tremor noted. Lab Results:   I have personally reviewed pertinent reports.   , CBC:   Results from last 7 days   Lab Units 08/30/23  0508 08/29/23  0803   WBC Thousand/uL 8.20 6.77   RBC Million/uL 5.27 6.07*   HEMOGLOBIN g/dL 15.5 17.9*   HEMATOCRIT % 45.1 51.9*   MCV fL 86 86   PLATELETS Thousands/uL 183 213   , BMP/CMP:   Results from last 7 days   Lab Units 08/30/23  0508 08/29/23  0803   SODIUM mmol/L 135 131*   POTASSIUM mmol/L 3.7 4.9   CHLORIDE mmol/L 104 99   CO2 mmol/L 24 24   BUN mg/dL 17 19   CREATININE mg/dL 1.12 1.31*   CALCIUM mg/dL 8.6 9.6   AST U/L 9* 18   ALT U/L 10 10   ALK PHOS U/L 59 80   EGFR ml/min/1.73sq m 63 52   , HgBA1C:   Results from last 7 days   Lab Units 08/29/23  0803   HEMOGLOBIN A1C % 8.4* , TSH:   Results from last 7 days   Lab Units 08/29/23  0803   TSH 3RD GENERATON uIU/mL 4.973*     Lab Results   Component Value Date    [id number] 4.973 (H) 08/29/2023   Free T4 1.06    , Coagulation:   Results from last 7 days   Lab Units 08/29/23  0803   INR  0.91   , Lipid Profile:   Results from last 7 days   Lab Units 08/29/23 0803   HDL mg/dL 30*   LDL CALC mg/dL 103*   TRIGLYCERIDES mg/dL 132     Imaging Studies: I have personally reviewed pertinent reports. and I have personally reviewed pertinent films in PACS  EKG, Pathology, and Other Studies: I have personally reviewed pertinent reports. VTE Prophylaxis: Enoxaparin (Lovenox)    Code Status: Level 3 - DNAR and DNI      Time spent completing this telemedicine evaluation:  92 minutes of direct patient contact during the televideo evaluation and 20 minutes reviewing relevant documentation, test results and imaging, and previous records.

## 2023-08-29 NOTE — Clinical Note
Case was discussed with Dr. Eh Rouse and the patient's admission status was agreed to be Admission Status: observation status to the service of Dr. Eh Rouse.

## 2023-08-29 NOTE — ASSESSMENT & PLAN NOTE
· Hypertensive urgency on arrival, now improved   · Continue home dose lisinopril, losartan and hospital formulary verapamil   · Monitor routinely

## 2023-08-29 NOTE — ASSESSMENT & PLAN NOTE
Incidental finding on CTA head/neck: 1.7 cm peripherally calcified nodule in right thyroid lobe  · Findings discussed with patient and family on admission   · Outpatient follow-up for US

## 2023-08-29 NOTE — ASSESSMENT & PLAN NOTE
68 y.o. left-handed male with hypertension, diabetes mellitus,  esophageal stricture requiring dilation in the past, and prior L2 vertebral fracture, who presented to the ED on 8/29 for evaluation of dysarthria, increased lethargy, and intermittent confusion. Blood pressure on arrival was 217/105. He was afebrile. Other vital signs stable. Initial lab work notable for hyponatremia (131), hypomagnesemia (1.8), elevated creatinine (1.31), and hyperglycemia (255). TSH is elevated. Workup:  CTA head and neck:. No acute pathology noted. Chronic lacunar infarcts were noted in the right basal ganglia and left cerebellum with severe chronic microangiopathy. 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 Lipid panel completed with , HDL 30. HbA1c 8.4    MRI brain wo: There is an acute juxtacortical white matter infarct within the right posterior frontal lobe. Chronic microhemorrhages are noted within the left temporal occipital junction and left posterior medial cerebellum. Echo: "Left Ventricle: Left ventricular cavity size is small. Wall thickness is mildly increased. There is mild concentric hypertrophy. There is concentric remodeling. The left ventricular ejection fraction is 69% by biplane measurement. . Systolic function is normal. Although no diagnostic regional wall motion abnormality was identified, this possibility cannot be completely excluded on the basis of this study. Diastolic function is mildly abnormal, consistent with grade I (abnormal) relaxation. Left atrial filling pressure is normal.. Jannetta Smack Left atrium is not well visualized. The atrium is normal in size."    Assessment:  The only apparent stroke symptom is dysarthria.   It worsened after a few days, likely due to extension of the stroke, but this worsening was the primary reason he and his family ended up seeking a medical evaluation. The tiredness/lethargy this past week is typical after a stroke, and is also the most likely reason for his intermittent confusion as family reports he is always confused when he first wakes up and he has been taking a lot more naps this week. There was some concern for mild RLE weakness when he first presented, however, this is not a new symptom (uncertain etiology). Unclear if his dysphagia in the hospital is related to the stroke or to his known esophageal issues (prior strictures, EGD in 2/2023 found a food bolus stuck in his esophagus and biopsies suggestive of eosinophilic esophagitis), with workup ongoing. The stroke is most likely from a central embolic source, likely cardioembolic, with afib the most likely cause. 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 Plan:  - continue aspirin 81mg daily and lipitor 40mg daily for secondary stroke prevention.  - prolonged cardiac monitoring after discharge to monitor for paroxysmal afib, which is a likely etiology of this stroke. - Repeat limited echo to try to obtain better images of the left atrium which was not well visualized on his initial echo. Consider discussion with cardiology regarding this and for setting him up for the aforementioned prolonged cardiac monitoring.  - PT/OT/SLP  - video barium study and possibly GI consult pending for dysphagia evaluation.  - outpatient vascular surgery vs thoracic surgery evaluation of the penetrating atheromatous ulcer  - continue neurochecks. Obtain STAT repeat CT for any significant change in exam and contact neurology.

## 2023-08-29 NOTE — ED PROVIDER NOTES
History  Chief Complaint   Patient presents with   • Slurred Speech     Pt presented to this ED with family member c/o slurred speech with difficulty speaking/finding words and generalized weakness starting 1wk ago. Hx dm. Denies travel/fevers/cough/sob/n/v/d     Patient is a 72-year-old male with history of diabetes hypertension presents the emergency department due to difficulty finding words slurred speech and generalized weakness and confusion symptoms started about 5 to 7 days ago and are still present. Patient reports he is having difficulty finding words and getting his words out and slurring speech. Patient is alert and oriented to person and time but not place he understands that he is at the hospital but thought he was brought to [facility]. No facial asymmetry does have some weakness in the right lower extremity on examination with drift. History provided by:  Patient and relative      Prior to Admission Medications   Prescriptions Last Dose Informant Patient Reported? Taking?   insulin aspart (NovoLOG FlexPen) 100 UNIT/ML injection pen   Yes No   Sig: USE ICR 1:7 FOR MEALS/SNACK + CORRECTION 1:10 >140 BEFORE MEAL AS DIRECTED (STARTS CORRECTION ) TOTAL DAILY DOSE UP TO 90 UNITS PER DAY   insulin detemir (Levemir FlexTouch) 100 Units/mL injection pen   Yes No   Sig: Inject 72 Units under the skin 2 (two) times a day   levothyroxine 112 mcg tablet   Yes No   Sig: Take 125 mcg by mouth in the morning   lisinopril (ZESTRIL) 20 mg tablet   Yes No   Sig: Take 20 mg by mouth in the morning   losartan (COZAAR) 50 mg tablet   Yes No   Sig: Take 1 tablet by mouth daily   omeprazole (PriLOSEC) 40 MG capsule   No No   Sig: Take 1 capsule (40 mg total) by mouth 2 (two) times a day for 30 days, THEN 1 capsule (40 mg total) daily.    pravastatin (PRAVACHOL) 20 mg tablet   Yes No   Sig: Take 20 mg by mouth daily at bedtime   verapamil (VERELAN) 240 MG 24 hr capsule   Yes No   Sig: Take 240 mg by mouth in the morning      Facility-Administered Medications: None       Past Medical History:   Diagnosis Date   • Diabetes mellitus ([address])    • Hypertension    • L2 vertebral fracture ([address])    • Stricture esophagus        Past Surgical History:   Procedure Laterality Date   • CATARACT EXTRACTION Bilateral    • CHOLECYSTECTOMY     • EGD         History reviewed. No pertinent family history. I have reviewed and agree with the history as documented. E-Cigarette/Vaping   • E-Cigarette Use Never User      E-Cigarette/Vaping Substances   • Nicotine No    • THC No    • CBD No    • Flavoring No    • Other No    • Unknown No      Social History     Tobacco Use   • Smoking status: Former   • Smokeless tobacco: Never   Vaping Use   • Vaping Use: Never used   Substance Use Topics   • Alcohol use: Not Currently   • Drug use: Never       Review of Systems   Constitutional: Negative for activity change, appetite change, chills, fatigue and fever. HENT: Negative for congestion, ear pain, rhinorrhea and sore throat. Eyes: Negative for discharge, redness and visual disturbance. Respiratory: Negative for cough, chest tightness, shortness of breath and wheezing. Cardiovascular: Negative for chest pain and palpitations. Gastrointestinal: Negative for abdominal pain, constipation, diarrhea, nausea and vomiting. Endocrine: Negative for polydipsia and polyuria. Genitourinary: Negative for difficulty urinating, dysuria, frequency, hematuria and urgency. Musculoskeletal: Negative for arthralgias and myalgias. Skin: Negative for color change, pallor and rash. Neurological: Positive for speech difficulty and weakness. Negative for dizziness, facial asymmetry, light-headedness, numbness and headaches. Hematological: Negative for adenopathy. Does not bruise/bleed easily. All other systems reviewed and are negative. Physical Exam  Physical Exam  Vitals and nursing note reviewed.    Constitutional:       Appearance: He is well-developed. HENT:      Head: Normocephalic and atraumatic. Right Ear: External ear normal.      Left Ear: External ear normal.      Nose: Nose normal.   Eyes:      General: No visual field deficit. Conjunctiva/sclera: Conjunctivae normal.      Pupils: Pupils are equal, round, and reactive to light. Cardiovascular:      Rate and Rhythm: Normal rate and regular rhythm. Heart sounds: Normal heart sounds. Pulmonary:      Effort: Pulmonary effort is normal. No respiratory distress. Breath sounds: Normal breath sounds. No wheezing or rales. Chest:      Chest wall: No tenderness. Abdominal:      General: Bowel sounds are normal. There is no distension. Palpations: Abdomen is soft. Tenderness: There is no abdominal tenderness. There is no guarding. Musculoskeletal:         General: Normal range of motion. Cervical back: Normal range of motion and neck supple. Skin:     General: Skin is warm and dry. Neurological:      Mental Status: He is alert. He is disoriented and confused. Cranial Nerves: Dysarthria present. No cranial nerve deficit or facial asymmetry. Sensory: No sensory deficit. Motor: Weakness present.       Coordination: Finger-Nose-Finger Test normal.         Vital Signs  ED Triage Vitals [08/29/23 0751]   Temperature Pulse Respirations Blood Pressure SpO2   (!) 97.4 °F (36.3 °C) 85 18 (!) 217/105 97 %      Temp Source Heart Rate Source Patient Position - Orthostatic VS BP Location FiO2 (%)   Tympanic Monitor Lying Right arm --      Pain Score       No Pain           Vitals:    08/29/23 0751 08/29/23 0815 08/29/23 0830 08/29/23 0930   BP: (!) 217/105 156/93 (!) 154/103 141/87   Pulse: 85 81 81 71   Patient Position - Orthostatic VS: Lying Lying Lying Lying         Visual Acuity  Visual Acuity    Flowsheet Row Most Recent Value   L Pupil Size (mm) 2   R Pupil Size (mm) 2          ED Medications  Medications   magnesium sulfate IVPB (premix) SOLN 1 g (1 g Intravenous New Bag 8/29/23 0929)   aspirin tablet 325 mg (has no administration in time range)   sodium chloride 0.9 % bolus 1,000 mL (0 mL Intravenous Stopped 8/29/23 0905)   iohexol (OMNIPAQUE) 350 MG/ML injection (SINGLE-DOSE) 85 mL (85 mL Intravenous Given 8/29/23 0848)       Diagnostic Studies  Results Reviewed     Procedure Component Value Units Date/Time    UA w Reflex to Microscopic w Reflex to Culture [[id number]] Collected: 08/29/23 0933    Lab Status:  In process Specimen: Urine, Clean Catch Updated: 08/29/23 0935    Magnesium [[id number]]  (Abnormal) Collected: 08/29/23 0803    Lab Status: Final result Specimen: Blood from Arm, Right Updated: 08/29/23 0838     Magnesium 1.8 mg/dL     Comprehensive metabolic panel [[id number]]  (Abnormal) Collected: 08/29/23 0803    Lab Status: Final result Specimen: Blood from Arm, Right Updated: 08/29/23 0838     Sodium 131 mmol/L      Potassium 4.9 mmol/L      Chloride 99 mmol/L      CO2 24 mmol/L      ANION GAP 8 mmol/L      BUN 19 mg/dL      Creatinine 1.31 mg/dL      Glucose 255 mg/dL      Calcium 9.6 mg/dL      AST 18 U/L      ALT 10 U/L      Alkaline Phosphatase 80 U/L      Total Protein 7.5 g/dL      Albumin 4.2 g/dL      Total Bilirubin 1.20 mg/dL      eGFR 52 ml/min/1.73sq m     Narrative:      [facility] guidelines [facility] Disease (CKD):   •  Stage 1 with normal or high GFR (GFR > 90 mL/min/1.73 square meters)  •  Stage 2 Mild CKD (GFR = 60-89 mL/min/1.73 square meters)  •  Stage 3A Moderate CKD (GFR = 45-59 mL/min/1.73 square meters)  •  Stage 3B Moderate CKD (GFR = 30-44 mL/min/1.73 square meters)  •  Stage 4 Severe CKD (GFR = 15-29 mL/min/1.73 square meters)  •  Stage 5 End Stage CKD (GFR <15 mL/min/1.73 square meters)  Note: GFR calculation is accurate only with a steady state creatinine    HS Troponin 0hr (reflex protocol) [[id number]]  (Normal) Collected: 08/29/23 0803    Lab Status: Final result Specimen: Blood from Arm, Right Updated: 08/29/23 0835     hs TnI 0hr 5 ng/L     Protime-INR [[id number]]  (Normal) Collected: 08/29/23 0803    Lab Status: Final result Specimen: Blood from Arm, Right Updated: 08/29/23 0828     Protime 12.6 seconds      INR 0.91    APTT [[id number]]  (Normal) Collected: 08/29/23 0803    Lab Status: Final result Specimen: Blood from Arm, Right Updated: 08/29/23 0828     PTT 25 seconds     Blood gas, venous [[id number]]  (Abnormal) Collected: 08/29/23 0803    Lab Status: Final result Specimen: Blood from Arm, Right Updated: 08/29/23 0821     pH, Kieran 7.404     pCO2, Kieran 37.5 mm Hg      pO2, Kieran 46.9 mm Hg      HCO3, Kieran 22.9 mmol/L      Base Excess, Kieran -1.3 mmol/L      O2 Content, Kieran 21.6 ml/dL      O2 HGB, VENOUS 81.7 %     Beta Hydroxybutyrate [[id number]]  (Normal) Collected: 08/29/23 0803    Lab Status: Final result Specimen: Blood from Arm, Right Updated: 08/29/23 0820     BETA-HYDROXYBUTYRATE 0.2 mmol/L     CBC and Platelet [[id number]]  (Abnormal) Collected: 08/29/23 0803    Lab Status: Final result Specimen: Blood from Arm, Right Updated: 08/29/23 0811     WBC 6.77 Thousand/uL      RBC 6.07 Million/uL      Hemoglobin 17.9 g/dL      Hematocrit 51.9 %      MCV 86 fL      MCH 29.5 pg      MCHC 34.5 g/dL      RDW 13.4 %      Platelets 340 Thousands/uL      MPV 10.4 fL     Fingerstick Glucose (POCT) [[id number]]  (Abnormal) Collected: 08/29/23 0753    Lab Status: Final result Updated: 08/29/23 0754     POC Glucose 223 mg/dl                  CTA head and neck with and without contrast   Final Result by Fer Amezcua MD (08/29 0915)      No acute intracranial abnormality. If continued concern for acute infarct, consider follow-up MRI brain without contrast.      Chronic lacunar infarcts in right basal ganglia and left cerebellum with severe chronic microangiopathy. Negative CTA head and neck for large vessel occlusion, dissection, or aneurysm.       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 Recommend vascular surgery follow-up. 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 Incidental thyroid nodule(s) for which nonemergent thyroid ultrasound is recommended. Additional chronic/incidental findings as detailed above. The study was marked in [facility] for immediate notification. Workstation performed: [id number]                    Procedures  ECG 12 Lead Documentation Only    Date/Time: 8/29/2023 7:56 AM    Performed by: Kim Mathis DO  Authorized by: Kim Mathis DO    ECG reviewed by me, the ED Provider: yes    Patient location:  ED  Previous ECG:     Comparison to cardiac monitor: Yes    Quality:     Tracing quality:  Limited by artifact  Rate:     ECG rate:  78    ECG rate assessment: normal    Rhythm:     Rhythm: sinus rhythm and A-V block      Rhythm comment:  First-degree AV block  QRS:     QRS axis:  Left    QRS intervals: Wide  Conduction:     Conduction: abnormal      Abnormal conduction: complete RBBB    ST segments:     ST segments:  Non-specific  T waves:     T waves: non-specific               ED Course  ED Course as of 08/29/23 0938   Tue Aug 29, 2023   0809 Stroke alert not activated due to patient's last known well being reported as 7 days prior and symptoms being reported as having been ongoing consistently for past 7 days therefore patient outside the window for lytic therapy and outside the inclusion window for stroke alert activation per protocol. [address] [place] [address] with Dr. Sanjuana Godinez neurology on-call reviewed case and findings in the emergency department he recommends CTA head and neck CT head and admit for further stroke work-up evaluation monitoring and treatment. [id number] with Dr. Radha Hartman hospitalist on-call reviewed case and findings in the emergency department management thus far and neurology recommendations accepts for admission.                     Stroke Assessment     Row Name 08/29/23 9144 NIH Stroke Scale    Interval --      Level of Consciousness (1a.) 0      LOC Questions (1b.) 1      LOC Commands (1c.) 0      Best Gaze (2.) 0      Visual (3.) 0      Facial Palsy (4.) 0      Motor Arm, Left (5a.) 0      Motor Arm, Right (5b.) 0      Motor Leg, Left (6a.) 0      Motor Leg, Right (6b.) 1      Limb Ataxia (7.) 0      Sensory (8.) 0      Best Language (9.) 1      Dysarthria (10.) 1      Extinction and Inattention (11.) (Formerly Neglect) 0      Total 4              Flowsheet Row Most Recent Value   Thrombolytic Decision Options    Thrombolytic Decision Patient not a candidate. Patient is not a candidate options Unclear time of onset outside appropriate time window. SBIRT 22yo+    Flowsheet Row Most Recent Value   Initial Alcohol Screen: US AUDIT-C     1. How often do you have a drink containing alcohol? 0 Filed at: 08/29/2023 0755   2. How many drinks containing alcohol do you have on a typical day you are drinking? 0 Filed at: 08/29/2023 0755   3a. Male UNDER 65: How often do you have five or more drinks on one occasion? 0 Filed at: 08/29/2023 0755   3b. FEMALE Any Age, or MALE 65+: How often do you have 4 or more drinks on one occassion? 0 Filed at: 08/29/2023 0755   Audit-C Score 0 Filed at: 08/29/2023 4986   JEFF: How many times in the past year have you. .. Used an illegal drug or used a prescription medication for non-medical reasons? Never Filed at: 08/29/2023 9099                    Medical Decision Making  HTN (hypertension): acute illness or injury  Hypomagnesemia: acute illness or injury  Stroke (cerebrum) [facility]): acute illness or injury  Amount and/or Complexity of Data Reviewed  Labs: ordered. Decision-making details documented in ED Course. Radiology: ordered and independent interpretation performed. Decision-making details documented in ED Course. ECG/medicine tests: ordered and independent interpretation performed.  Decision-making details documented in ED Course. Risk  OTC drugs. Prescription drug management. Decision regarding hospitalization. Disposition  Final diagnoses:   Stroke (cerebrum) ([address])   HTN (hypertension)   Hypomagnesemia     Time reflects when diagnosis was documented in both MDM as applicable and the Disposition within this note     Time User Action Codes Description Comment    8/29/2023  7:52 AM Arie Simón Add [I63.9] Stroke (cerebrum) ([address])     8/29/2023  8:38 AM Arie Simón Add [I10] HTN (hypertension)     8/29/2023  8:39 AM Arie Simón Add [E83.42] Hypomagnesemia       ED Disposition     ED Disposition   Admit    Condition   Stable    Date/Time   Tue Aug 29, 2023  9:38 AM    Comment   Case was discussed with Dr. Mendel Elise and the patient's admission status was agreed to be Admission Status: observation status to the service of Dr. Mendel Elise.           Garett Keepers    None         Patient's Medications   Discharge Prescriptions    No medications on file       No discharge procedures on file.     PDMP Review     None          ED Provider  Electronically Signed by           Guero Bautista DO  08/29/23 0570

## 2023-08-29 NOTE — ASSESSMENT & PLAN NOTE
Lab Results   Component Value Date    EGFR 52 08/29/2023    EGFR 65 02/15/2023    EGFR 49 02/14/2023    CREATININE 1.31 (H) 08/29/2023    CREATININE 1.08 02/15/2023    CREATININE 1.37 (H) 02/14/2023     · No documented history of CKD but baseline creatinine appears to be 1.1-1.3 per chart review   · UA reviewed and unremarkable   · Provide gentle IV fluids overnight and monitor for improvement   · Avoid nephrotoxins and hypotension   · Continue home ACE/ARB for now  · Trend BMP

## 2023-08-29 NOTE — PLAN OF CARE
Problem: MOBILITY - ADULT  Goal: Maintain or return to baseline ADL function  Description: INTERVENTIONS:  -  Assess patient's ability to carry out ADLs; assess patient's baseline for ADL function and identify physical deficits which impact ability to perform ADLs (bathing, care of mouth/teeth, toileting, grooming, dressing, etc.)  - Assess/evaluate cause of self-care deficits   - Assess range of motion  - Assess patient's mobility; develop plan if impaired  - Assess patient's need for assistive devices and provide as appropriate  - Encourage maximum independence but intervene and supervise when necessary  - Involve family in performance of ADLs  - Assess for home care needs following discharge   - Consider OT consult to assist with ADL evaluation and planning for discharge  - Provide patient education as appropriate  Outcome: Progressing  Goal: Maintains/Returns to pre admission functional level  Description: INTERVENTIONS:  - Perform BMAT or MOVE assessment daily.   - Set and communicate daily mobility goal to care team and patient/family/caregiver. - Collaborate with rehabilitation services on mobility goals if consulted  - Perform Range of Motion 2 times a day. - Reposition patient every 2 hours.   - Dangle patient 2 times a day  - Stand patient 2 times a day  - Ambulate patient 2 times a day  - Out of bed to chair 2 times a day   - Out of bed for meals 2 times a day  - Out of bed for toileting  - Record patient progress and toleration of activity level   Outcome: Progressing     Problem: PAIN - ADULT  Goal: Verbalizes/displays adequate comfort level or baseline comfort level  Description: Interventions:  - Encourage patient to monitor pain and request assistance  - Assess pain using appropriate pain scale  - Administer analgesics based on type and severity of pain and evaluate response  - Implement non-pharmacological measures as appropriate and evaluate response  - Consider cultural and social influences on pain and pain management  - Notify physician/advanced practitioner if interventions unsuccessful or patient reports new pain  Outcome: Progressing     Problem: INFECTION - ADULT  Goal: Absence or prevention of progression during hospitalization  Description: INTERVENTIONS:  - Assess and monitor for signs and symptoms of infection  - Monitor lab/diagnostic results  - Monitor all insertion sites, i.e. indwelling lines, tubes, and drains  - Monitor endotracheal if appropriate and nasal secretions for changes in amount and color  - Welsh appropriate cooling/warming therapies per order  - Administer medications as ordered  - Instruct and encourage patient and family to use good hand hygiene technique  - Identify and instruct in appropriate isolation precautions for identified infection/condition  Outcome: Progressing  Goal: Absence of fever/infection during neutropenic period  Description: INTERVENTIONS:  - Monitor WBC    Outcome: Progressing     Problem: SAFETY ADULT  Goal: Maintain or return to baseline ADL function  Description: INTERVENTIONS:  -  Assess patient's ability to carry out ADLs; assess patient's baseline for ADL function and identify physical deficits which impact ability to perform ADLs (bathing, care of mouth/teeth, toileting, grooming, dressing, etc.)  - Assess/evaluate cause of self-care deficits   - Assess range of motion  - Assess patient's mobility; develop plan if impaired  - Assess patient's need for assistive devices and provide as appropriate  - Encourage maximum independence but intervene and supervise when necessary  - Involve family in performance of ADLs  - Assess for home care needs following discharge   - Consider OT consult to assist with ADL evaluation and planning for discharge  - Provide patient education as appropriate  Outcome: Progressing  Goal: Maintains/Returns to pre admission functional level  Description: INTERVENTIONS:  - Perform BMAT or MOVE assessment daily.   - Set and communicate daily mobility goal to care team and patient/family/caregiver. - Collaborate with rehabilitation services on mobility goals if consulted  - Perform Range of Motion 2 times a day. - Reposition patient every 2 hours.   - Dangle patient 2 times a day  - Stand patient 2 times a day  - Ambulate patient 2 times a day  - Out of bed to chair 2 times a day   - Out of bed for meals 2 times a day  - Out of bed for toileting  - Record patient progress and toleration of activity level   Outcome: Progressing  Goal: Patient will remain free of falls  Description: INTERVENTIONS:  - Educate patient/family on patient safety including physical limitations  - Instruct patient to call for assistance with activity   - Consult OT/PT to assist with strengthening/mobility   - Keep Call bell within reach  - Keep bed low and locked with side rails adjusted as appropriate  - Keep care items and personal belongings within reach  - Initiate and maintain comfort rounds  - Make Fall Risk Sign visible to staff  - Offer Toileting every 2 Hours, in advance of need  - Initiate/Maintain bed/chair alarm  - Obtain necessary fall risk management equipment: na  - Apply yellow socks and bracelet for high fall risk patients  - Consider moving patient to room near nurses station  Outcome: Progressing     Problem: DISCHARGE PLANNING  Goal: Discharge to home or other facility with appropriate resources  Description: INTERVENTIONS:  - Identify barriers to discharge w/patient and caregiver  - Arrange for needed discharge resources and transportation as appropriate  - Identify discharge learning needs (meds, wound care, etc.)  - Arrange for interpretive services to assist at discharge as needed  - Refer to Case Management Department for coordinating discharge planning if the patient needs post-hospital services based on physician/advanced practitioner order or complex needs related to functional status, cognitive ability, or social support system  Outcome: Progressing     Problem: Knowledge Deficit  Goal: Patient/family/caregiver demonstrates understanding of disease process, treatment plan, medications, and discharge instructions  Description: Complete learning assessment and assess knowledge base. Interventions:  - Provide teaching at level of understanding  - Provide teaching via preferred learning methods  Outcome: Progressing     Problem: Neurological Deficit  Goal: Neurological status is stable or improving  Description: Interventions:  - Monitor and assess patient's level of consciousness, motor function, sensory function, and level of assistance needed for ADLs. - Monitor and report changes from baseline. Collaborate with interdisciplinary team to initiate plan and implement interventions as ordered. - Provide and maintain a safe environment. - Consider seizure precautions. - Consider fall precautions. - Consider aspiration precautions. - Consider bleeding precautions. Outcome: Progressing     Problem: Activity Intolerance/Impaired Mobility  Goal: Mobility/activity is maintained at optimum level for patient  Description: Interventions:  - Assess and monitor patient  barriers to mobility and need for assistive/adaptive devices. - Assess patient's emotional response to limitations. - Collaborate with interdisciplinary team and initiate plans and interventions as ordered. - Encourage independent activity per ability.  - Maintain proper body alignment. - Perform active/passive rom as tolerated/ordered. - Plan activities to conserve energy.  - Turn patient as appropriate  Outcome: Progressing     Problem: Communication Impairment  Goal: Ability to express needs and understand communication  Description: Assess patient's communication skills and ability to understand information. Patient will demonstrate use of effective communication techniques, alternative methods of communication and understanding even if not able to speak.      - Encourage communication and provide alternate methods of communication as needed. - Collaborate with case management/ for discharge needs. - Include patient/family/caregiver in decisions related to communication. Outcome: Progressing     Problem: Potential for Aspiration  Goal: Non-ventilated patient's risk of aspiration is minimized  Description: Assess and monitor vital signs, respiratory status, and labs (WBC). Monitor for signs of aspiration (tachypnea, cough, rales, wheezing, cyanosis, fever). - Assess and monitor patient's ability to swallow. - Place patient up in chair to eat if possible. - HOB up at 90 degrees to eat if unable to get patient up into chair.  - Supervise patient during oral intake. - Instruct patient/ family to take small bites. - Instruct patient/ family to take small single sips when taking liquids. - Follow patient-specific strategies generated by speech pathologist.  Outcome: Progressing  Goal: Ventilated patient's risk of aspiration is minimized  Description: Assess and monitor vital signs, respiratory status, airway cuff pressure, and labs (WBC). Monitor for signs of aspiration (tachypnea, cough, rales, wheezing, cyanosis, fever). - Elevate head of bed 30 degrees if patient has tube feeding.  - Monitor tube feeding. Outcome: Progressing     Problem: Nutrition  Goal: Nutrition/Hydration status is improving  Description: Monitor and assess patient's nutrition/hydration status for malnutrition (ex- brittle hair, bruises, dry skin, pale skin and conjunctiva, muscle wasting, smooth red tongue, and disorientation). Collaborate with interdisciplinary team and initiate plan and interventions as ordered. Monitor patient's weight and dietary intake as ordered or per policy. Utilize nutrition screening tool and intervene per policy. Determine patient's food preferences and provide high-protein, high-caloric foods as appropriate.      - Assist patient with eating.  - Allow adequate time for meals.  - Encourage patient to take dietary supplement as ordered. - Collaborate with clinical nutritionist.  - Include patient/family/caregiver in decisions related to nutrition.   Outcome: Progressing

## 2023-08-29 NOTE — H&P
[facility]  H&P  Name: Lindsey Linn 68 y.o. male I MRN: [id number]  Unit/Bed#: [facility] I Date of Admission: 8/29/2023   Date of Service: 8/29/2023 I Hospital Day: 0      Assessment/Plan   * Dysarthria  Assessment & Plan  Presented with 5-7 days history of  slurred speech. Admitted to stroke pathway. 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 · CTA head/neck: Negative for large vessel occlusion, dissection, or aneurysm. 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    · Status post 325 mg ASA on arrival, continue with 81 mg daily and home dose satin   · MRI brain   · Echocardiogram   · A1c, TSH, lipid panel   · Telemetry monitoring   · Neurology consult   · PT/OT/ST evaluations    Hypertension  Assessment & Plan  · Hypertensive urgency on arrival, now improved   · Continue home dose lisinopril, losartan and hospital formulary verapamil   · Monitor routinely     Type 2 diabetes mellitus with hyperglycemia, with long-term current use of insulin ([facility])  Assessment & Plan  Lab Results   Component Value Date    HGBA1C 9.4 (H) 05/26/2023       Recent Labs     08/29/23  0753   POCGLU 223*       Blood Sugar Average: Last 72 hrs:  (P) 223     · Poorly controlled evidenced by most recent A1c, follow-up repeat   · Home insulin regimen: Levimir 72 units twice daily + SSI with meals   · Start decreased dose Levimir 45 units twice daily + SSI   · Monitor accuchecks and adjust as needed   · Diabetic diet     Hyponatremia  Assessment & Plan  Na 131 on arrival, component of pseudohyponatremia in the setting of hyperglycemia   · Provide gentle IV fluids with normal saline   · Repeat BMP in AM    CKD (chronic kidney disease)  Assessment & Plan  Lab Results   Component Value Date    EGFR 52 08/29/2023    EGFR 65 02/15/2023    EGFR 49 02/14/2023    CREATININE 1.31 (H) 08/29/2023    CREATININE 1.08 02/15/2023    CREATININE 1.37 (H) 02/14/2023     · No documented history of CKD but baseline creatinine appears to be 1.1-1.3 per chart review   · UA reviewed and unremarkable   · Provide gentle IV fluids overnight and monitor for improvement   · Avoid nephrotoxins and hypotension   · Continue home ACE/ARB for now  · Trend BMP    Hypothyroidism  Assessment & Plan  · Follow-up TSH, free T4   · Continue home dose levothyroxine for now          VTE Pharmacologic Prophylaxis: VTE Score: 9 High Risk (Score >/= 5) - Pharmacological DVT Prophylaxis Ordered: enoxaparin (Lovenox). Sequential Compression Devices Ordered. Code Status: Prior DNR/DNI  Discussion with family: Updated  (daughter in law) at bedside. Anticipated Length of Stay: Patient will be admitted on an inpatient basis with an anticipated length of stay of greater than 2 midnights secondary to stroke workup. Total Time Spent on Date of Encounter in care of patient: 40 minutes This time was spent on one or more of the following: performing physical exam; counseling and coordination of care; obtaining or reviewing history; documenting in the medical record; reviewing/ordering tests, medications or procedures; communicating with other healthcare professionals and discussing with patient's family/caregivers. Chief Complaint:     History of Present Illness:  Eben Julian is a 68 y.o. male with a PMH of uncontrolled insulin dependent T2DM, HTN, HLD, hypothyroid who presents with 5-7 days history of slurred speech. Patient admitted to stroke pathway and will be seen in consultation by neurology. At the time of my exam, patient reports feeling well with no specific complaints. Denies recent sickness, word finding difficulty, headache, dizziness, confusion. He endorses generalized weakness. Discussed CT findings showed old stroke and MRI will determine if he had recent stroke. Discussed additional stroke workup with plan for neurology evaluation.      Review of Systems:  Review of Systems   Neurological: Positive for speech difficulty and weakness (generalized). All other systems reviewed and are negative. Past Medical and Surgical History:   Past Medical History:   Diagnosis Date   • Diabetes mellitus ([address])    • Hypertension    • L2 vertebral fracture (HCC)    • Stricture esophagus        Past Surgical History:   Procedure Laterality Date   • CATARACT EXTRACTION Bilateral    • CHOLECYSTECTOMY     • EGD         Meds/Allergies:  Prior to Admission medications    Medication Sig Start Date End Date Taking? Authorizing Provider   insulin aspart (NovoLOG FlexPen) 100 UNIT/ML injection pen USE ICR 1:7 FOR MEALS/SNACK + CORRECTION 1:10 >140 BEFORE MEAL AS DIRECTED (STARTS CORRECTION ) TOTAL DAILY DOSE UP TO 90 UNITS PER DAY 1/5/23   Historical Provider, MD   insulin detemir (Levemir FlexTouch) 100 Units/mL injection pen Inject 72 Units under the skin 2 (two) times a day    Historical Provider, MD   levothyroxine 112 mcg tablet Take 125 mcg by mouth in the morning    Historical Provider, MD   lisinopril (ZESTRIL) 20 mg tablet Take 20 mg by mouth in the morning    Historical Provider, MD   losartan (COZAAR) 50 mg tablet Take 1 tablet by mouth daily 12/19/22   Historical Provider, MD   omeprazole (PriLOSEC) 40 MG capsule Take 1 capsule (40 mg total) by mouth 2 (two) times a day for 30 days, THEN 1 capsule (40 mg total) daily. 2/15/23 4/16/23  Socrates Smiley PA-C   pravastatin (PRAVACHOL) 20 mg tablet Take 20 mg by mouth daily at bedtime    Historical Provider, MD   verapamil (VERELAN) 240 MG 24 hr capsule Take 240 mg by mouth in the morning    Historical Provider, MD BARRIOS have reviewed home medications using recent Epic encounter. Allergies: No Known Allergies    Social History:  Marital Status:     Occupation:   Patient Pre-hospital Living Situation: Home  Patient Pre-hospital Level of Mobility: walks  Patient Pre-hospital Diet Restrictions:   Substance Use History:   Social History     Substance and Sexual Activity   Alcohol Use Not Currently     Social History     Tobacco Use   Smoking Status Former   Smokeless Tobacco Never     Social History     Substance and Sexual Activity   Drug Use Never       Family History:  History reviewed. No pertinent family history. Physical Exam:     Vitals:   Blood Pressure: 141/87 (08/29/23 0930)  Pulse: 71 (08/29/23 0930)  Temperature: (!) 97.4 °F (36.3 °C) (08/29/23 0751)  Temp Source: Tympanic (08/29/23 0751)  Respirations: 21 (08/29/23 0930)  Height: 6' (182.9 cm) (08/29/23 0751)  Weight - Scale: 112 kg (247 lb 5.7 oz) (08/29/23 0751)  SpO2: 97 % (08/29/23 0930)    Physical Exam  Constitutional:       General: He is not in acute distress. Appearance: He is obese. Cardiovascular:      Rate and Rhythm: Normal rate and regular rhythm. Pulmonary:      Effort: Pulmonary effort is normal. No respiratory distress. Breath sounds: No wheezing, rhonchi or rales. Neurological:      Mental Status: He is alert and oriented to person, place, and time. Mental status is at baseline. Cranial Nerves: Dysarthria (mild) present. No facial asymmetry. Motor: No weakness.           Additional Data:     Lab Results:  Results from last 7 days   Lab Units 08/29/23  0803   WBC Thousand/uL 6.77   HEMOGLOBIN g/dL 17.9*   HEMATOCRIT % 51.9*   PLATELETS Thousands/uL 213     Results from last 7 days   Lab Units 08/29/23  0803   SODIUM mmol/L 131*   POTASSIUM mmol/L 4.9   CHLORIDE mmol/L 99   CO2 mmol/L 24   BUN mg/dL 19   CREATININE mg/dL 1.31*   ANION GAP mmol/L 8   CALCIUM mg/dL 9.6   ALBUMIN g/dL 4.2   TOTAL BILIRUBIN mg/dL 1.20*   ALK PHOS U/L 80   ALT U/L 10   AST U/L 18   GLUCOSE RANDOM mg/dL 255*     Results from last 7 days   Lab Units 08/29/23  0803   INR  0.91     Results from last 7 days   Lab Units 08/29/23  0753   POC GLUCOSE mg/dl 223*               Lines/Drains:  Invasive Devices     Peripheral Intravenous Line Duration           Peripheral IV 08/29/23 Dorsal (posterior); Left;Proximal Forearm <1 day    Peripheral IV 08/29/23 Dorsal (posterior); Right Hand <1 day                    Imaging: Reviewed radiology reports from this admission including: CT head  CTA head and neck with and without contrast   Final Result by Kasia Zarate MD (08/29 0915)      No acute intracranial abnormality. If continued concern for acute infarct, consider follow-up MRI brain without contrast.      Chronic lacunar infarcts in right basal ganglia and left cerebellum with severe chronic microangiopathy. Negative CTA head and neck for large vessel occlusion, dissection, or aneurysm. 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 Recommend vascular surgery follow-up. 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 Incidental thyroid nodule(s) for which nonemergent thyroid ultrasound is recommended. Additional chronic/incidental findings as detailed above. The study was marked in [facility] for immediate notification. Workstation performed: [id number]         MRI brain wo contrast    (Results Pending)       EKG and Other Studies Reviewed on Admission:   · EKG: sinus with 1st degree AV block, RBBB. ** Please Note: This note has been constructed using a voice recognition system.  **

## 2023-08-29 NOTE — ASSESSMENT & PLAN NOTE
Presented with 5-7 days history of  slurred speech. Admitted to stroke pathway. 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 · CTA head/neck: Negative for large vessel occlusion, dissection, or aneurysm. 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    · Status post 325 mg ASA on arrival, continue with 81 mg daily and home dose satin   · MRI brain   · Echocardiogram   · A1c, TSH, lipid panel   · Telemetry monitoring   · Neurology consult   · PT/OT/ST evaluations

## 2023-08-30 LAB
ALBUMIN SERPL BCP-MCNC: 3.5 G/DL (ref 3.5–5)
ALP SERPL-CCNC: 59 U/L (ref 34–104)
ALT SERPL W P-5'-P-CCNC: 10 U/L (ref 7–52)
ANION GAP SERPL CALCULATED.3IONS-SCNC: 7 MMOL/L
AST SERPL W P-5'-P-CCNC: 9 U/L (ref 13–39)
BILIRUB SERPL-MCNC: 1.38 MG/DL (ref 0.2–1)
BUN SERPL-MCNC: 17 MG/DL (ref 5–25)
CALCIUM SERPL-MCNC: 8.6 MG/DL (ref 8.4–10.2)
CHLORIDE SERPL-SCNC: 104 MMOL/L (ref 96–108)
CO2 SERPL-SCNC: 24 MMOL/L (ref 21–32)
CREAT SERPL-MCNC: 1.12 MG/DL (ref 0.6–1.3)
ERYTHROCYTE [DISTWIDTH] IN BLOOD BY AUTOMATED COUNT: 13.4 % (ref 11.6–15.1)
GFR SERPL CREATININE-BSD FRML MDRD: 63 ML/MIN/1.73SQ M
GLUCOSE SERPL-MCNC: 116 MG/DL (ref 65–140)
GLUCOSE SERPL-MCNC: 117 MG/DL (ref 65–140)
GLUCOSE SERPL-MCNC: 121 MG/DL (ref 65–140)
GLUCOSE SERPL-MCNC: 167 MG/DL (ref 65–140)
GLUCOSE SERPL-MCNC: 193 MG/DL (ref 65–140)
GLUCOSE SERPL-MCNC: 246 MG/DL (ref 65–140)
HCT VFR BLD AUTO: 45.1 % (ref 36.5–49.3)
HGB BLD-MCNC: 15.5 G/DL (ref 12–17)
MCH RBC QN AUTO: 29.4 PG (ref 26.8–34.3)
MCHC RBC AUTO-ENTMCNC: 34.4 G/DL (ref 31.4–37.4)
MCV RBC AUTO: 86 FL (ref 82–98)
PLATELET # BLD AUTO: 183 THOUSANDS/UL (ref 149–390)
PMV BLD AUTO: 10.3 FL (ref 8.9–12.7)
POTASSIUM SERPL-SCNC: 3.7 MMOL/L (ref 3.5–5.3)
PROT SERPL-MCNC: 6 G/DL (ref 6.4–8.4)
RBC # BLD AUTO: 5.27 MILLION/UL (ref 3.88–5.62)
SODIUM SERPL-SCNC: 135 MMOL/L (ref 135–147)
WBC # BLD AUTO: 8.2 THOUSAND/UL (ref 4.31–10.16)

## 2023-08-30 PROCEDURE — 97116 GAIT TRAINING THERAPY: CPT

## 2023-08-30 PROCEDURE — 92610 EVALUATE SWALLOWING FUNCTION: CPT

## 2023-08-30 PROCEDURE — 85027 COMPLETE CBC AUTOMATED: CPT | Performed by: PHYSICIAN ASSISTANT

## 2023-08-30 PROCEDURE — G0427 INPT/ED TELECONSULT70: HCPCS | Performed by: PSYCHIATRY & NEUROLOGY

## 2023-08-30 PROCEDURE — 99232 SBSQ HOSP IP/OBS MODERATE 35: CPT

## 2023-08-30 PROCEDURE — 82948 REAGENT STRIP/BLOOD GLUCOSE: CPT

## 2023-08-30 PROCEDURE — 80053 COMPREHEN METABOLIC PANEL: CPT | Performed by: PHYSICIAN ASSISTANT

## 2023-08-30 PROCEDURE — 97163 PT EVAL HIGH COMPLEX 45 MIN: CPT

## 2023-08-30 PROCEDURE — 97167 OT EVAL HIGH COMPLEX 60 MIN: CPT

## 2023-08-30 RX ORDER — INSULIN LISPRO 100 [IU]/ML
1-6 INJECTION, SOLUTION INTRAVENOUS; SUBCUTANEOUS
Status: DISCONTINUED | OUTPATIENT
Start: 2023-08-30 | End: 2023-08-31

## 2023-08-30 RX ADMIN — ENOXAPARIN SODIUM 40 MG: 40 INJECTION SUBCUTANEOUS at 14:58

## 2023-08-30 RX ADMIN — ATORVASTATIN CALCIUM 40 MG: 40 TABLET, FILM COATED ORAL at 17:00

## 2023-08-30 RX ADMIN — LEVOTHYROXINE SODIUM 125 MCG: 125 TABLET ORAL at 08:32

## 2023-08-30 RX ADMIN — LOSARTAN POTASSIUM 50 MG: 50 TABLET, FILM COATED ORAL at 08:32

## 2023-08-30 RX ADMIN — SODIUM CHLORIDE 50 ML/HR: 0.9 INJECTION, SOLUTION INTRAVENOUS at 08:34

## 2023-08-30 RX ADMIN — INSULIN LISPRO 1 UNITS: 100 INJECTION, SOLUTION INTRAVENOUS; SUBCUTANEOUS at 11:25

## 2023-08-30 RX ADMIN — INSULIN DETEMIR 20 UNITS: 100 INJECTION, SOLUTION SUBCUTANEOUS at 21:15

## 2023-08-30 RX ADMIN — INSULIN LISPRO 3 UNITS: 100 INJECTION, SOLUTION INTRAVENOUS; SUBCUTANEOUS at 21:23

## 2023-08-30 RX ADMIN — INSULIN LISPRO 2 UNITS: 100 INJECTION, SOLUTION INTRAVENOUS; SUBCUTANEOUS at 17:00

## 2023-08-30 RX ADMIN — ASPIRIN 81 MG: 81 TABLET, COATED ORAL at 08:32

## 2023-08-30 RX ADMIN — VERAPAMIL HYDROCHLORIDE 120 MG: 120 TABLET, FILM COATED, EXTENDED RELEASE ORAL at 08:32

## 2023-08-30 NOTE — PROGRESS NOTES
[address] [facility]  Progress Note  Name: Kim Fulton  MRN: [id number]  Unit/Bed#: -01 I Date of Admission: 8/29/2023   Date of Service: 8/30/2023 I Hospital Day: 1    Assessment/Plan   * Acute CVA (cerebrovascular accident) [facility])  Assessment & Plan  Presented with 5-7 days history of  slurred speech. Admitted to stroke pathway. 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 · CTA head/neck: Negative for large vessel occlusion, dissection, or aneurysm. 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 · Status post 325 mg ASA on arrival, continue with 81 mg daily and home dose satin   · MRI brain: Small acute juxtacortical white matter infarct within the right posterior frontal lobe, without mass effect or hemorrhagic transformation. Advanced chronic microangiopathic change elsewhere within the brain parenchyma. Chronic microhemorrhages within the left temporal occipital junction and left posterior medial cerebellum. No acute hemorrhage.    · Echocardiogram normal  · , TSH normal  · A1c 8.4. improved from 9.4  · Neurology consult   · PT/OT recommending acute rehab  · Speech recommending GI consult for possible esophageal dilation    Stricture esophagus  Assessment & Plan  · Hx of esophageal stricture requiring dilation  · Speech consulted as patient having difficulty swallowing  · GI consulted for possible EGD and dilation  · NPO after midnight     Hyperlipidemia  Assessment & Plan  Lipid panel: cholesterol 159, ,    · Continued on home dose pravastatin     Penetrating atherosclerotic ulcer of aorta (HCC)  Assessment & Plan  Incidental finding on CTA head/neck: 2.2 cm penetrating atheromatous ulcer along lateral wall of aortic arch projecting inferolaterally   · Findings discussed with patient and family at time of admission   · Vascular surgery referral on discharge   · Continue with ASA and statin     Thyroid nodule  Assessment & Plan  Incidental finding on CTA head/neck: 1.7 cm peripherally calcified nodule in right thyroid lobe  · Findings discussed with patient and family on admission   · Outpatient follow-up for US     Hyponatremia  Assessment & Plan  Na 131 on arrival, component of pseudohyponatremia in the setting of hyperglycemia   · Provide gentle IV fluids with normal saline   · Repeat BMP in AM    CKD (chronic kidney disease)  Assessment & Plan  Lab Results   Component Value Date    EGFR 52 08/29/2023    EGFR 65 02/15/2023    EGFR 49 02/14/2023    CREATININE 1.31 (H) 08/29/2023    CREATININE 1.08 02/15/2023    CREATININE 1.37 (H) 02/14/2023     · No documented history of CKD but baseline creatinine appears to be 1.1-1.3 per chart review   · UA reviewed and unremarkable   · Provide gentle IV fluids overnight and monitor for improvement   · Avoid nephrotoxins and hypotension   · Continue home ACE/ARB for now  · Trend BMP    Hypothyroidism  Assessment & Plan  · TSH elevated 4.97, free T4   · Continue home dose levothyroxine for now     Hypertension  Assessment & Plan  · Hypertensive urgency on arrival, now improved   · Continue home dose lisinopril, losartan and hospital formulary verapamil   · Monitor routinely     Type 2 diabetes mellitus with hyperglycemia, with long-term current use of insulin [facility])  Assessment & Plan  Lab Results   Component Value Date    HGBA1C 8.4 (H) 08/29/2023       Recent Labs     08/29/23  2052 08/30/23  0025 08/30/23  0510 08/30/23  1124   POCGLU 147* 116 117 167*       Blood Sugar Average: Last 72 hrs:  (P) [id number]     · Poorly controlled evidenced by most recent A1c, follow-up repeat   · Home insulin regimen: Levimir 72 units twice daily + SSI with meals   · Start decreased dose Levimir 45 units twice daily + SSI   · Monitor accuchecks and adjust as needed   · Diabetic diet              VTE Pharmacologic Prophylaxis: VTE Score: 9 High Risk (Score >/= 5) - Pharmacological DVT Prophylaxis Ordered: enoxaparin (Lovenox). Sequential Compression Devices Ordered. Patient Centered Rounds: I performed bedside rounds with nursing staff today. Discussions with Specialists or Other Care Team Provider: Neurology and speech    Education and Discussions with Family / Patient: Updated  (son) at bedside. Total Time Spent on Date of Encounter in care of patient: 45 minutes This time was spent on one or more of the following: performing physical exam; counseling and coordination of care; obtaining or reviewing history; documenting in the medical record; reviewing/ordering tests, medications or procedures; communicating with other healthcare professionals and discussing with patient's family/caregivers. Current Length of Stay: 1 day(s)  Current Patient Status: Inpatient   Certification Statement: The patient will continue to require additional inpatient hospital stay due to acute CVA  Discharge Plan: Anticipate discharge in 24-48 hrs to rehab facility. Code Status: Level 3 - DNAR and DNI    Subjective:   Patient states that he is feeling good today. States that his speech is still slurred. Denies any other focal neurodeficits. Denies chest pain or shortness of breath. Patient agreeable to current plan is. Sided to go to acute rehab to work on his strength and motor skills. Objective:     Vitals:   Temp (24hrs), Av.2 °F (36.8 °C), Min:97.8 °F (36.6 °C), Max:98.5 °F (36.9 °C)    Temp:  [97.8 °F (36.6 °C)-98.5 °F (36.9 °C)] 98 °F (36.7 °C)  HR:  [54-95] 82  Resp:  [18-29] 18  BP: (113-166)/(59-92) 118/63  SpO2:  [93 %-97 %] 93 %  Body mass index is 33.19 kg/m². Input and Output Summary (last 24 hours): Intake/Output Summary (Last 24 hours) at 2023 1428  Last data filed at 2023 1234  Gross per 24 hour   Intake 1350.83 ml   Output 1300 ml   Net 50.83 ml       Physical Exam:   Physical Exam  Vitals reviewed.    Constitutional: General: He is not in acute distress. Appearance: Normal appearance. He is obese. He is not ill-appearing. HENT:      Head: Normocephalic and atraumatic. Nose: Nose normal.      Mouth/Throat:      Mouth: Mucous membranes are moist.      Pharynx: Oropharynx is clear. Eyes:      Extraocular Movements: Extraocular movements intact. Conjunctiva/sclera: Conjunctivae normal.   Cardiovascular:      Rate and Rhythm: Normal rate and regular rhythm. Pulses: Normal pulses. Heart sounds: Normal heart sounds. No murmur heard. Pulmonary:      Effort: Pulmonary effort is normal. No respiratory distress. Breath sounds: Normal breath sounds. No wheezing. Abdominal:      General: Abdomen is flat. Bowel sounds are normal. There is no distension. Palpations: Abdomen is soft. Tenderness: There is no abdominal tenderness. There is no guarding. Musculoskeletal:         General: Normal range of motion. Cervical back: Normal range of motion. Right lower leg: No edema. Left lower leg: No edema. Skin:     General: Skin is warm. Neurological:      Mental Status: He is alert and oriented to person, place, and time. Mental status is at baseline. Cranial Nerves: No cranial nerve deficit. Sensory: No sensory deficit. Motor: No weakness. Comments: Mild slurred speech  Equal strength in upper and lower extremities  No other focal neuro deficits   Psychiatric:         Mood and Affect: Mood normal.         Behavior: Behavior normal.         Thought Content:  Thought content normal.         Judgment: Judgment normal.          Additional Data:     Labs:  Results from last 7 days   Lab Units 08/30/23  0508   WBC Thousand/uL 8.20   HEMOGLOBIN g/dL 15.5   HEMATOCRIT % 45.1   PLATELETS Thousands/uL 183     Results from last 7 days   Lab Units 08/30/23  0508   SODIUM mmol/L 135   POTASSIUM mmol/L 3.7   CHLORIDE mmol/L 104   CO2 mmol/L 24   BUN mg/dL 17   CREATININE mg/dL 1.12   ANION GAP mmol/L 7   CALCIUM mg/dL 8.6   ALBUMIN g/dL 3.5   TOTAL BILIRUBIN mg/dL 1.38*   ALK PHOS U/L 59   ALT U/L 10   AST U/L 9*   GLUCOSE RANDOM mg/dL 121     Results from last 7 days   Lab Units 08/29/23  0803   INR  0.91     Results from last 7 days   Lab Units 08/30/23  1124 08/30/23  0510 08/30/23  0025 08/29/23  2052 08/29/23  1639 08/29/23  1245 08/29/23  0753   POC GLUCOSE mg/dl 167* 117 116 147* 178* 260* 223*     Results from last 7 days   Lab Units 08/29/23  0803   HEMOGLOBIN A1C % 8.4*           Lines/Drains:  Invasive Devices     Peripheral Intravenous Line  Duration           Peripheral IV 08/29/23 Dorsal (posterior); Left;Proximal Forearm 1 day                      Imaging: Reviewed radiology reports from this admission including: MRI brain    Recent Cultures (last 7 days):         Last 24 Hours Medication List:   Current Facility-Administered Medications   Medication Dose Route Frequency Provider Last Rate   • aspirin  81 mg Oral Daily Rebeca E TALHA Dominique     • atorvastatin  40 mg Oral Daily With Vitaliy Otero MD     • enoxaparin  40 mg Subcutaneous Daily Rebeca E TALHA Dominique     • insulin detemir  20 Units Subcutaneous Q12H [address] [facility] SURAJ Salgado     • insulin lispro  1-6 Units Subcutaneous 4x Daily (AC & HS) Ramonita Gruber MD     • levothyroxine  125 mcg Oral Daily Rebeca E TALHA Dominique     • losartan  50 mg Oral Daily Rebeca E TALHA Dominique     • verapamil  120 mg Oral Daily Rebeca E TALHA Dominique          Today, Patient Was Seen By: Ca Jacobs PA-C    **Please Note: This note may have been constructed using a voice recognition system. **

## 2023-08-30 NOTE — DISCHARGE INSTR - DIET
Level 2 mechanical soft/ thin liquids  Medications whole, one at a time  OOB for all meals; slow rate; alternate solids/liquids.

## 2023-08-30 NOTE — OCCUPATIONAL THERAPY NOTE
Occupational Therapy Evaluation     Patient Name: Alm Krabbe  XCEJT'D Date: 8/30/2023  Problem List  Principal Problem:    Acute CVA (cerebrovascular accident) [facility])  Active Problems:    Type 2 diabetes mellitus with hyperglycemia, with long-term current use of insulin (HCC)    Hypertension    Hypothyroidism    CKD (chronic kidney disease)    Hyponatremia    Thyroid nodule    Penetrating atherosclerotic ulcer of aorta (HCC)    Hyperlipidemia    Past Medical History  Past Medical History:   Diagnosis Date    Diabetes mellitus ([address])     Hypertension     L2 vertebral fracture ([address])     Stricture esophagus      Past Surgical History  Past Surgical History:   Procedure Laterality Date    CATARACT EXTRACTION Bilateral     CHOLECYSTECTOMY      EGD        08/30/23 0743   Note Type   Note type Evaluation   Pain Assessment   Pain Assessment Tool 0-10   Pain Score No Pain   Restrictions/Precautions   Other Precautions Chair Alarm; Bed Alarm; Fall Risk;Multiple lines;Telemetry   Home Living   Type of Home House  ([address])   Home Layout Two level;Bed/bath upstairs;1/2 bath on main level  (stair glide to 2nd floor; has been sleeping downstairs on couch)   Bathroom Shower/Tub Walk-in shower   Bathroom Toilet Raised   Bathroom Equipment Shower chair   Home Equipment Walker;Cane   Additional Comments Pt reports living alone in a [facility]. SPC use at baseline. Prior Function   Level of [place] Independent with ADLs; Independent with functional mobility; Needs assistance with IADLS   Lives With Alone   Receives Help From Family   IADLs Family/Friend/Other provides transportation; Family/Friend/Other provides meals; Family/Friend/Other provides medication management   Falls in the last 6 months 1 to 4   Comments Pt reports his son and DIL live next door and bring him meals/assist with IADLs PRN   ADL   UB Dressing Assistance 5  Supervision/Setup   UB Dressing Deficit Increased time to complete;Supervision/safety   LB Dressing Assistance 4  Minimal Assistance   LB Dressing Deficit Setup; Requires assistive device for steadying;Verbal cueing;Supervision/safety; Increased time to complete   Toileting Assistance  4  Minimal Assistance   Toileting Deficit Setup;Verbal cueing; Increased time to complete;Supervison/safety   Additional Comments UB ADLs @ S/set-up while seated at EOB. LB ADLs @ Min A. Pt required assist to don socks while seated at EOB. Min A for balance during standing ADL tasks. Bed Mobility   Supine to Sit   (SBA)   Additional items Increased time required;Verbal cues   Additional Comments Pt supine in bed at beginning of session. Supine to sit @ SBA with HOB flat and cues for sequencing of task. Pt able to maintain seated balance at EOB @ S. Transfers   Sit to Stand   (Steadying assist)   Additional items Assist x 1; Increased time required;Verbal cues   Stand to Sit   (Steadying assist)   Additional items Assist x 1; Increased time required;Verbal cues   Stand pivot 4  Minimal assistance   Additional items Assist x 1; Increased time required;Verbal cues   Toilet transfer 4  Minimal assistance   Additional items Assist x 1; Increased time required;Verbal cues;Standard toilet   Additional Comments STS from EOB to [facility] @ Steadying assist. Pt able to complete functional mobility to bathroom with [facility] @ Min A. Pt given RW for safety while seated on toilet. Toilet transfer @ Min A d/t low surface height. Pt completing functional mobility around room with RW @ Steadying assist-Min A. Pt seated OOB in chair at end of session with chair alarm intact, call bell within reach and all needs met.    Balance   Static Sitting Good   Dynamic Sitting Fair +   Static Standing Poor +   Dynamic Standing Poor   RUE Assessment   RUE Assessment WFL   LUE Assessment   LUE Assessment WFL   Hand Function   Gross Motor Coordination Functional   Fine Motor Coordination Functional   Vision-Basic Assessment   Current Vision Wears glasses all the time   Vision - Complex Assessment   Ocular Range of Motion Intact   Tracking Intact   Cognition   Overall Cognitive Status [facility]   Arousal/Participation Alert; Responsive; Cooperative   Attention Within functional limits   Orientation Level Oriented X4   Memory Within functional limits   Following Commands Follows one step commands without difficulty   Comments Pt with word-finding difficulties and slurred speech noted throughout   Assessment   Limitation Decreased ADL status; Decreased Safe judgement during ADL;Decreased endurance;Decreased self-care trans;Decreased high-level ADLs   Prognosis Good   Assessment Pt is a 68 y.o. male, admitted to [address] [place] [address] 8/29/2023 d/t experiencing slurred speech. Dx: acute CVA. Pt with PMHx impacting their performance during ADL tasks, including: uncontrolled insulin-dependent T2DM, HTN, HLD hypothyroid, L2 vertebral fracture, stricture esophagus. Prior to admission to the hospital Pt was performing ADLs without physical assistance. IADLs with physical assistance. Functional transfers/ambulation without physical assistance. Cognitive status was PTA was Intact. OT order placed to assess Pt's ADLs, cognitive status, and performance during functional tasks in order to maximize safety and independence while making most appropriate d/c recommendations. PT/OT co-evaluation completed at this time d/t mobility deficits and safety concerns. Pt's clinical presentation is currently unstable/unpredictable given new onset deficits that effect Pt's occupational performance and ability to safely return to OF including decrease activity tolerance, decrease standing balance, decrease performance during ADL tasks, decrease safety awareness , decrease generalized strength and decrease performance during functional transfers combined with medical complications of abnormal sodium values, increased RR and need for input for mobility technique/safety.  Personal factors affecting Pt at time of initial evaluation include: step(s) to enter environment, multi-level environment, advanced age, new need for AD, inability to navigate community distances and limited insight into impairments. Pt will benefit from continued skilled OT services to address deficits as defined above and to maximize level independence/participation during ADLs and functional tasks to facilitate return toward PLOF and improved quality of life. From an occupational therapy standpoint, recommendation at time of d/c would be acute rehabilitation services. Plan   Treatment Interventions ADL retraining;Visual perceptual retraining;Functional transfer training;UE strengthening/ROM; Cognitive reorientation; Endurance training;Patient/family training;Equipment evaluation/education; Neuromuscular reeducation; Fine motor coordination activities; Compensatory technique education;Cardiac education;Continued evaluation;UE splinting; Energy conservation; Activityengagement   Goal Expiration Date 09/13/23   OT Frequency 3-5x/wk   Recommendation   OT Discharge Recommendation Post acute rehabilitation services   -[facility] Daily Activity Inpatient   Lower Body Dressing 3   Bathing 3   Toileting 3   Upper Body Dressing 3   Grooming 3   Eating 4   Daily Activity Raw Score 19   Daily Activity Standardized Score (Calc for Raw Score >=11) 40.22   AM-PAC Applied Cognition Inpatient   Following a Speech/Presentation 3   Understanding Ordinary Conversation 4   Taking Medications 3   Remembering Where Things Are Placed or Put Away 3   Remembering List of 4-5 Errands 3   Taking Care of Complicated Tasks 3   Applied Cognition Raw Score 19   Applied Cognition Standardized Score 39.77     The patient's raw score on the AM-PAC Daily Activity Inpatient Short Form is 19. A raw score of greater than or equal to 19 suggests the patient may benefit from discharge to home. Please refer to the recommendation of the Occupational Therapist for safe discharge planning.     Pt goals to be met by 9/13/2023    Pt will demonstrate ability to complete grooming/hygiene tasks @ Mod I after set-up. Pt will demonstrate ability to complete supine<>sit @ Mod I in order to increase safety and independence during ADL tasks. Pt will demonstrate ability to complete UB ADLs including washing/dressing @ Mod I in order to increase performance and participation during meaningful tasks  Pt will demonstrate ability to complete LB dressing @ Mod I in order to increase safety and independence during meaningful tasks. Pt will demonstrate ability to beverley/doff socks/shoes while sitting EOB @ Mod I in order to increase safety and independence during meaningful tasks. Pt will demonstrate ability to complete toileting tasks including CM and pericare @ Mod I in order to increase safety and independence during meaningful tasks. Pt will demonstrate ability to complete EOB, chair, toilet/commode transfers @ Mod I in order to increase performance and participation during functional tasks. Pt will demonstrate ability to stand for 5-8 minutes while maintaining Good balance with use of LRAD for UB support PRN. Pt will demonstrate ability to tolerate 30-35 minute OT session with no vc'ing for deep breathing or use of energy conservation techniques in order to increase activity tolerance during functional tasks. Pt will demonstrate Good carryover of use of energy conservation/compensatory strategies during ADLs and functional tasks in order to increase safety and reduce risk for falls. Pt will demonstrate Good attention and participation in continued evaluation of functional ambulation house hold distances in order to assist with safe d/c planning. Pt will attend to continued cognitive assessments 100% of the time in order to provide most appropriate d/c recommendations. Pt will follow 100% simple 2-step commands and be A&O x4 consistently with environmental cues to increase participation in functional activities.    Pt will identify 3 areas of interest/hobbies and 1 intervention on how to incorporate into daily life in order to increase interaction with environment and peers as well as increase participation in meaningful tasks. Pt will demonstrate 100% carryover of BUE HEP in order to increase BUE MS and increase performance during functional tasks upon d/c home.     Veronique Tierney, OTR/L

## 2023-08-30 NOTE — ASSESSMENT & PLAN NOTE
· Hx of esophageal stricture requiring dilation  · Speech consulted as patient having difficulty swallowing  · GI consulted for possible EGD and dilation  · NPO after midnight

## 2023-08-30 NOTE — PLAN OF CARE
Problem: OCCUPATIONAL THERAPY ADULT  Goal: Performs self-care activities at highest level of function for planned discharge setting. See evaluation for individualized goals. Description: Treatment Interventions: ADL retraining, Visual perceptual retraining, Functional transfer training, UE strengthening/ROM, Cognitive reorientation, Endurance training, Patient/family training, Equipment evaluation/education, Neuromuscular reeducation, Fine motor coordination activities, Compensatory technique education, Cardiac education, Continued evaluation, UE splinting, Energy conservation, Activityengagement          See flowsheet documentation for full assessment, interventions and recommendations. Note: Limitation: Decreased ADL status, Decreased Safe judgement during ADL, Decreased endurance, Decreased self-care trans, Decreased high-level ADLs  Prognosis: Good  Assessment: Pt is a 68 y.o. male, admitted to [address] [place] [address] 8/29/2023 d/t experiencing slurred speech. Dx: acute CVA. Pt with PMHx impacting their performance during ADL tasks, including: uncontrolled insulin-dependent T2DM, HTN, HLD hypothyroid, L2 vertebral fracture, stricture esophagus. Prior to admission to the hospital Pt was performing ADLs without physical assistance. IADLs with physical assistance. Functional transfers/ambulation without physical assistance. Cognitive status was PTA was Intact. OT order placed to assess Pt's ADLs, cognitive status, and performance during functional tasks in order to maximize safety and independence while making most appropriate d/c recommendations. PT/OT co-evaluation completed at this time d/t mobility deficits and safety concerns.  Pt's clinical presentation is currently unstable/unpredictable given new onset deficits that effect Pt's occupational performance and ability to safely return to OF including decrease activity tolerance, decrease standing balance, decrease performance during ADL tasks, decrease safety awareness , decrease generalized strength and decrease performance during functional transfers combined with medical complications of abnormal sodium values, increased RR and need for input for mobility technique/safety. Personal factors affecting Pt at time of initial evaluation include: step(s) to enter environment, multi-level environment, advanced age, new need for AD, inability to navigate community distances and limited insight into impairments. Pt will benefit from continued skilled OT services to address deficits as defined above and to maximize level independence/participation during ADLs and functional tasks to facilitate return toward PLOF and improved quality of life. From an occupational therapy standpoint, recommendation at time of d/c would be acute rehabilitation services.      OT Discharge Recommendation: Post acute rehabilitation services

## 2023-08-30 NOTE — ASSESSMENT & PLAN NOTE
Lab Results   Component Value Date    HGBA1C 8.4 (H) 08/29/2023       Recent Labs     08/29/23 2052 08/30/23  0025 08/30/23  0510 08/30/23  1124   POCGLU 147* 116 117 167*       Blood Sugar Average: Last 72 hrs:  (P) [id number]     · Poorly controlled evidenced by most recent A1c, follow-up repeat   · Home insulin regimen: Levimir 72 units twice daily + SSI with meals   · Start decreased dose Levimir 45 units twice daily + SSI   · Monitor accuchecks and adjust as needed   · Diabetic diet

## 2023-08-30 NOTE — SPEECH THERAPY NOTE
Speech Language/Pathology  Speech-Language Pathology Bedside Swallow Evaluation      Patient Name: Jasiel Peter    PCJWF'N Date: 8/30/2023     Summary   Consult received for stroke pathway and bedside swallow eval secondary to coughing/expectoration of chicken sandwich yesterday. Pt admitted w/ infart in R posterior frontal lobe and dysarthria. PMHx includes HTN, DM2, EGD d/t esophageal obstruction (2/2023, see below), and CKD. Pt is currently NPO d/t difficulty w/ chicken sandwich yesterday resulting in regurgitation. Pt was seen by SLP during previous admission following EGD and placed on a dental soft diet w/ thin liquids. Pt reports he eats whatever he wants at home but should be more careful due to his lack of dentition. Pt seen w/ PO trials of puree, dental soft deli sandwich and thins via straw. Pt demonstrated moderate oral phase dysphagia characterized by prolonged mastication d/t lack of dentition. Suspected pharyngeal vs esophageal phase dysphagia characterized by extensive coughing episode following deli sandwich. Pt stated it may be too difficult to get down d/ the bread. Extensive education provided on soft diet secondary to lack of dentition which could be exacerbating esophageal symptoms d/t food bolus not being properly masticated. Pt expressed understanding. Recommend level 2 mechanical soft w/ thin liquids. Meds whole as tolerated  OOB for all meals, alteration of liquids/solids and small bites. Recommend GI consult d/t hx of esophageal impaction. SLP will continue to follow as able/appropriate.   Recommend full speech/lang assessment d/t new dysarthria, will complete as schedule allows    Risk/s for Aspiration: Low-Moderate d/t known esophageal dysfunction     Recommended Diet: mechanically altered/level 2 diet and thin liquids   Recommended Form of Meds: whole with liquid   Aspiration precautions and swallowing strategies: upright posture, slow rate of feeding, small bites/sips and alternating bites and sips  Other Recommendations: Continue frequent oral care        Current Medical Status  Ayan Plummer is a 68 y.o. male with a PMH of uncontrolled insulin dependent T2DM, HTN, HLD, hypothyroid who presents with 5-7 days history of slurred speech. Patient admitted to stroke pathway and will be seen in consultation by neurology. At the time of my exam, patient reports feeling well with no specific complaints. Denies recent sickness, word finding difficulty, headache, dizziness, confusion. He endorses generalized weakness. Discussed CT findings showed old stroke and MRI will determine if he had recent stroke. Discussed additional stroke workup with plan for neurology evaluation. Current Precautions:  Fall  Aspiration    Allergies:  No known food allergies    Past medical history:  Please see H&P for details    Special Studies:  MRI brain:  Small acute juxtacortical white matter infarct within the right posterior frontal lobe, series 3 image 20 without mass effect or hemorrhagic transformation. Advanced chronic microangiopathic change elsewhere within the brain parenchyma. Chronic microhemorrhages within the left temporal occipital junction and left posterior medial cerebellum. No acute hemorrhage. EGD February 2023:  FINDINGS:  • Food bolus in the lower third of the esophagus, successfully removed by pushing it into the stomach. Incomplete obstruction likely causing his symptoms  • Severe erythematous mucosa with erosion in the GE junction (40 cm from the incisors).  Consistent with LA class D esophagitis  • Performed forceps biopsies to rule out eosinophilic esophagitis in the upper third of the esophagus and lower third of the esophagus  • The stomach appeared normal.  • The duodenal bulb and 2nd part of the duodenum appeared normal.    Social/Education/Vocational Hx:  Pt lives with family    Swallow Information   Current Risks for Dysphagia & Aspiration: known history of dysphagia  Current Symptoms/Concerns: coughing with po and choking incident  Current Diet: NPO   Baseline Diet: regular diet and thin liquids      Baseline Assessment   Behavior/Cognition: alert  Speech/Language Status: able to participate in conversation  Patient Positioning: upright in recliner  Pain Status/Interventions/Response to Interventions:   No report of or nonverbal indications of pain. Swallow Mechanism Exam  Facial: symmetrical  Labial: decreased coordination  Lingual: decreased coordination  Velum: symmetrical  Mandible: adequate ROM  Dentition: limited dentition and poor oral hygiene  Vocal quality:clear/adequate   Volitional Cough: strong/productive   Respiratory Status: on RA        Consistencies Assessed and Performance   Consistencies Administered: thin liquids, puree and soft solids    Oral Stage: mild-moderate  Mastication was prolonged with the materials administered today. Bolus formation and transfer were functional with no significant oral residue noted. No overt s/s reduced oral control. Pharyngeal Stage: suspected  Swallow Mechanics:  Swallowing initiation appeared prompt. Laryngeal rise was palpated and judged to be within functional limits. +cough following deli sandwich     Esophageal Concerns: globus sensation    Summary and Recommendations (see above)    Results Reviewed with: patient, RN and PA     Treatment Recommended: Dysphagia therapy      Frequency of treatment: 1-2x/week    Dysphagia LTG  -Patient will demonstrate safe and effective oral intake (without overt s/s significant oral/pharyngeal dysphagia including s/s penetration or aspiration) for the highest appropriate diet level.      Short Term Goals:  -Pt will tolerate Dysphagia 2/mechanical soft diet and honey thick nectar thick thin liquid with no significant s/s oral or pharyngeal dysphagia across 1-3 diagnostic session/s.    -Patient will tolerate trials of upgraded food and/or liquid texture with no significant s/s of oral or pharyngeal dysphagia including aspiration across 1-3 diagnostic sessions     Re: Mastication  -Patient will demonstrate adequate mastication to safely consume the  least restrictive diet with no verbal, visual or tactile cues needed. Re: Compensatory Strategies  -Patient will demonstrate independent use of recommended safe swallowing strategies during a clinician assessed meal across 1-3 diagnostic sessions.    - Patient’s caregiver will demonstrate adherence to recommended diet, as well as application of aspiration precautions and compensatory strategies.             Speech Therapy Prognosis   Prognosis: fair    Prognosis Considerations: prior medical history     Ying Bansal, [id number] [place]-SLP  8/30/2023

## 2023-08-30 NOTE — CONSULTS
Consult received for stroke pathway. Reviewed A1c and lipid panel. Pending speech eval. Pt reports good appetite at baseline. Reports eating whatever meals his DIL prepares for him. Typically has cooked breakfast such as egg and homefries. Admits to eating an entire family size bag of popcorn 2x per week. Drinks flavored water primarily. Enjoys nonstarchy vegetables and eats large portions of these though does not describe frequency. Discussed A1c/eAG level with pt. Discussed appropriate portion sizes of carbohydrate containing foods such as 3 cups of popcorn, 1/4 plate starches at dinner meal, etc. Encouraged nonstarchy vegetables regularly, aim for 1/2 plate. Provided pt with Carb Counting Nutrition Therapy, Plate Method, Label Reading Tips handouts along with RD contact information. Dysphagia diet per SLP if indicated, recommend CCD 3, cardiac diet.

## 2023-08-30 NOTE — CASE MANAGEMENT
Case Management Discharge Planning Note    Patient name Javon Briscoe  Location /-15 MRN [id number]  : 1945 Date 2023       Current Admission Date: 2023  Current Admission Diagnosis:Acute CVA (cerebrovascular accident) [facility])   Patient Active Problem List    Diagnosis Date Noted   • Acute CVA (cerebrovascular accident) ([address]) 2023   • Hypothyroidism 2023   • CKD (chronic kidney disease) 2023   • Hyponatremia 2023   • Thyroid nodule 2023   • Penetrating atherosclerotic ulcer of aorta ([address]) 2023   • Hyperlipidemia 2023   • Foreign body sensation in throat 2023   • Type 2 diabetes mellitus with hyperglycemia, with long-term current use of insulin ([address])    • Hypertension       LOS (days): 1  Geometric Mean LOS (GMLOS) (days): 1.90  Days to GMLOS:0.8     OBJECTIVE:  Risk of Unplanned Readmission Score: 11.68         Current admission status: Inpatient   Preferred Pharmacy:   [address] [place]  [address] [address]  Phone: [phone] Fax: [phone]    Primary Care Provider: Carolyn No MD    Primary Insurance: [facility]  Secondary Insurance:     DISCHARGE DETAILS:    Discharge planning discussed with[de-identified] patient and son  Freedom of Choice: Yes  Comments - Freedom of Choice: agreeable to blanket referrals to acute rehabs  CM contacted family/caregiver?: Yes (son at bedside)  Were Treatment Team discharge recommendations reviewed with patient/caregiver?: Yes  Did patient/caregiver verbalize understanding of patient care needs?: Yes  Were patient/caregiver advised of the risks associated with not following Treatment Team discharge recommendations?: Yes    Contacts  Contact Method:  In Person  Reason/Outcome: Discharge Planning              Other Referral/Resources/Interventions Provided:  Interventions: Acute Rehab         Treatment Team Recommendation: Acute Rehab  Discharge Destination Plan[de-identified] Acute Rehab   spoke with pt and son at bedside regarding therapy recommendations of acute rehab. Pt and son agreeable to blanket referrals. Referrals sent via aidin.

## 2023-08-30 NOTE — UTILIZATION REVIEW
Initial Clinical Review    Admission: Date/Time/Statement:   Admission Orders (From admission, onward)     Ordered        08/29/23 0937  INPATIENT ADMISSION  Once                      Orders Placed This Encounter   Procedures   • INPATIENT ADMISSION     Standing Status:   Standing     Number of Occurrences:   1     Order Specific Question:   Level of Care     Answer:   Med Surg [16]     Order Specific Question:   Estimated length of stay     Answer:   More than 2 Midnights     Order Specific Question:   Certification     Answer:   I certify that inpatient services are medically necessary for this patient for a duration of greater than two midnights. See H&P and MD Progress Notes for additional information about the patient's course of treatment. ED Arrival Information     Expected   -    Arrival   8/29/2023 07:42    Acuity   Emergent            Means of arrival   Wheelchair    Escorted by   Family Member    Service   Hospitalist    Admission type   Emergency            Arrival complaint   Slurred speech/generalized weakness           Chief Complaint   Patient presents with   • Slurred Speech     Pt presented to this ED with family member c/o slurred speech with difficulty speaking/finding words and generalized weakness starting 1wk ago. Hx dm. Denies travel/fevers/cough/sob/n/v/d       Initial Presentation: 68 y.o. male to ED via [facility] from home  Present to ED with 5-7 days history of slurred speech. Lives alone at home, walks with a walker. PMHX uncontrolled insulin dependent T2DM, HTN, HLD, hypothyroid   Admitted to ICU with DX: Acute CVA (cerebrovascular accident)  on exam: hypertensive; dysarthria; endorses generalized weakness. Coordination test (finger-nose)-and left side is diminished.   CT  Small acute juxtacortical white matter infarct within the right posterior frontal lobe  PLAN: neuro checks; f/u MRI; cont ivf; cont asa / statin / plavix; PT / OT / ST eval - tx; f/u echo with loop recorder; tele monitoring; neurology consulted      Date: 8/30/23    Day 2  Patient states that he is feeling good today. States that his speech is still slurred. Echocardiogram normal; PT/OT recommending acute rehab; Speech recommending GI consult for possible esophageal dilation  Plan: neuro checks; f/u MRI; cont ivf; cont asa / statin / plavix; tele monitoring; neurology consulted; GI consulted; CM consulted for placement    NEURO CONSULT: Acute CVA   MRI brain was completed. There is an acute juxtacortical white matter infarct within the right posterior frontal lobe. Chronic microhemorrhages are noted within the left temporal occipital junction and left posterior medial cerebellum. Suspect that the his initial stroke extended after a few days to make his dysarthria more constant/pronounced rather than gradual progression over the week prior to presentation. Plan: cont asa / statin; cont tele monitoring; repeat echo; PT / OT ST     GI CONSULT:   history of dysphagia secondary to LA Grade D esophagitis and eosinophilic esophagitis who presented to [address] [place] [address] with CVA and slurred speech on 8/29/2023. Patient is a poor historian so majority of history taken from chart. Last EGD February 2023 with LA Grade D esophagitis and food impaction that was resolved with EGD. RECOMMENDATIONS: Recommend esophagram to visualize esophagus. Would not recommend emergent EGD given patient's ability to eat, recent stroke, and untreated eosinophilic esophagitis. Would start treatment for eosinophilic esophagitis - continue PPI BID and add fluticasone 2 puffs swallowed BID. Once cardiology testing (echo) and vascular evaluation (for aortic arch ulceration) has been completed then may consider EGD as outpatient to monitor response to medications, and to potentially perform intervention at that time. Proceed with soft diet for now.     Date: 8/31/23    Day 3: Has surpassed a 2nd midnight with active treatments and services, which include PT/OT recommending acute rehab - CM consulted for d/c planning / placement.         ED Triage Vitals [08/29/23 0751]   Temperature Pulse Respirations Blood Pressure SpO2   (!) 97.4 °F (36.3 °C) 85 18 (!) 217/105 97 %      Temp Source Heart Rate Source Patient Position - Orthostatic VS BP Location FiO2 (%)   Tympanic Monitor Lying Right arm --      Pain Score       No Pain          Wt Readings from Last 1 Encounters:   08/29/23 111 kg (244 lb 11.4 oz)     Additional Vital Signs:   Date/Time Temp Pulse Resp BP MAP (mmHg) SpO2 O2 Device Patient Position - Orthostatic VS   08/31/23 0725 97.6 °F (36.4 °C) 64 22 151/73 101 98 % None (Room air) Lying   08/30/23 2300 97.2 °F (36.2 °C) Abnormal  64 18 108/69 83 93 % None (Room air) Lying   08/30/23 1900 97.7 °F (36.5 °C) 78 22 113/67 74 93 % None (Room air) Sitting   08/30/23 1500 97.9 °F (36.6 °C) 74 19 101/61 75 95 % None (Room air) Sitting       Date/Time Temp Pulse Resp BP MAP (mmHg) SpO2 O2 Device Patient Position - Orthostatic VS   08/30/23 1127 98 °F (36.7 °C) 82 18 118/63 84 93 % None (Room air) Sitting   08/30/23 0830 98.2 °F (36.8 °C) 75 24 Abnormal  117/89 100 95 % None (Room air) Sitting   08/30/23 0700 -- 61 25 Abnormal  124/62 87 94 % None (Room air) Lying   08/30/23 0600 -- 54 Abnormal  23 Abnormal  115/59 81 97 % -- --   08/30/23 0500 -- 56 20 137/65 93 97 % -- --   08/30/23 0400 98.5 °F (36.9 °C) 61 20 137/65 93 95 % None (Room air) Lying   08/30/23 0000 -- 65 22 124/64 88 96 % -- --   08/29/23 2300 98.4 °F (36.9 °C) 76 27 Abnormal  136/65 93 95 % None (Room air) Lying   08/29/23 2200 -- 75 19 116/67 87 95 % -- --   08/29/23 2100 -- 76 21 113/59 81 95 % -- --   08/29/23 2000 -- 86 29 Abnormal  118/75 92 93 % -- --   08/29/23 1900 98.1 °F (36.7 °C) 95 24 Abnormal  115/70 85 94 % None (Room air) Lying   08/29/23 1800 -- 71 18 162/78 110 93 % -- --   08/29/23 1700 -- 83 26 Abnormal  128/92 106 94 % -- --   08/29/23 1500 97.8 °F (36.6 °C) 72 22 166/76 109 94 % -- -- 08/29/23 1400 -- 76 -- 155/69 99 95 % -- --   08/29/23 1300 -- 66 -- 150/78 108 97 % -- --   08/29/23 1259 -- 66 -- 174/90 Abnormal  -- 97 % -- --   08/29/23 1200 -- 65 -- 174/90 Abnormal  125 96 % -- --   08/29/23 1145 97 °F (36.1 °C) Abnormal  68 20 174/90 Abnormal  -- 97 % None (Room air) Sitting   08/29/23 0930 -- 71 21 141/87 110 97 % None (Room air) Lying   08/29/23 0830 -- 81 21 154/103 Abnormal  123 96 % None (Room air) Lying   08/29/23 0815 -- 81 21 156/93 118 94 % None (Room air) Lying   08/29/23 0755 -- -- -- -- -- -- None (Room air) --   08/29/23 0751 97.4 °F (36.3 °C) Abnormal  85 18 217/105 Abnormal  150 97 % None (Room air) Lying         EKG:  Sinus rhythm with 1st degree A-V block  Right bundle branch block  Abnormal ECG  No previous ECGs available    Pertinent Labs/Diagnostic Test Results:   MRI brain wo contrast   Final Result by Tim Eubanks DO (08/29 1133)      Small acute juxtacortical white matter infarct within the right posterior frontal lobe, series 3 image 20 without mass effect or hemorrhagic transformation. Advanced chronic microangiopathic change elsewhere within the brain parenchyma. Chronic microhemorrhages within the left temporal occipital junction and left posterior medial cerebellum. No acute hemorrhage. This examination was marked "immediate notification" in Epic in order to begin the standard process by which the radiology reading room liaison alerts the referring practitioner. Workstation performed: [id number]         CTA head and neck with and without contrast   Final Result by Facundo Johns MD (08/29 0915)      No acute intracranial abnormality. If continued concern for acute infarct, consider follow-up MRI brain without contrast.      Chronic lacunar infarcts in right basal ganglia and left cerebellum with severe chronic microangiopathy. Negative CTA head and neck for large vessel occlusion, dissection, or aneurysm.       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 Recommend vascular surgery follow-up. 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 Incidental thyroid nodule(s) for which nonemergent thyroid ultrasound is recommended. Additional chronic/incidental findings as detailed above. The study was marked in [facility] for immediate notification.                Workstation performed: [id number]         FL barium swallow ROUTINE esophagus    (Results Pending)         Results from last 7 days   Lab Units 08/30/23  0508 08/29/23  0803   WBC Thousand/uL 8.20 6.77   HEMOGLOBIN g/dL 15.5 17.9*   HEMATOCRIT % 45.1 51.9*   PLATELETS Thousands/uL 183 213         Results from last 7 days   Lab Units 08/30/23  0508 08/29/23  0803   SODIUM mmol/L 135 131*   POTASSIUM mmol/L 3.7 4.9   CHLORIDE mmol/L 104 99   CO2 mmol/L 24 24   ANION GAP mmol/L 7 8   BUN mg/dL 17 19   CREATININE mg/dL 1.12 1.31*   EGFR ml/min/1.73sq m 63 52   CALCIUM mg/dL 8.6 9.6   MAGNESIUM mg/dL  --  1.8*     Results from last 7 days   Lab Units 08/30/23  0508 08/29/23  0803   AST U/L 9* 18   ALT U/L 10 10   ALK PHOS U/L 59 80   TOTAL PROTEIN g/dL 6.0* 7.5   ALBUMIN g/dL 3.5 4.2   TOTAL BILIRUBIN mg/dL 1.38* 1.20*     Results from last 7 days   Lab Units 08/31/23  0802 08/30/23  2113 08/30/23  1625 08/30/23  1124 08/30/23  0510 08/30/23  0025 08/29/23  2052 08/29/23  1639 08/29/23  1245 08/29/23  0753   POC GLUCOSE mg/dl 189* 246* 193* 167* 117 116 147* 178* 260* 223*     Results from last 7 days   Lab Units 08/30/23  0508 08/29/23  0803   GLUCOSE RANDOM mg/dL 121 255*       Results from last 7 days   Lab Units 08/29/23  0803   HEMOGLOBIN A1C % 8.4*   EAG mg/dl 194     BETA-HYDROXYBUTYRATE   Date Value Ref Range Status   08/29/2023 0.2 <0.6 mmol/L Final        Results from last 7 days   Lab Units 08/29/23  0803   PH OANH  7.404*   PCO2 OANH mm Hg 37.5*   PO2 OANH mm Hg 46.9*   HCO3 OANH mmol/L 22.9*   BASE EXC OANH mmol/L -1.3 O2 CONTENT OANH ml/dL 21.6   O2 HGB, VENOUS % 81.7*       Results from last 7 days   Lab Units 08/29/23  0803   HS TNI 0HR ng/L 5       Results from last 7 days   Lab Units 08/29/23  0803   PROTIME seconds 12.6   INR  0.91   PTT seconds 25     Results from last 7 days   Lab Units 08/29/23  0803   TSH 3RD GENERATON uIU/mL 4.973*       Results from last 7 days   Lab Units 08/29/23  0933   CLARITY UA  Clear   COLOR UA  Yellow   SPEC GRAV UA  <=1.005   PH UA  6.5   GLUCOSE UA mg/dl 500 (1/2%)*   KETONES UA mg/dl Negative   BLOOD UA  Negative   PROTEIN UA mg/dl Negative   NITRITE UA  Negative   BILIRUBIN UA  Negative   UROBILINOGEN UA E.U./dl 1.0   LEUKOCYTES UA  Negative       ED Treatment:   Medication Administration from 08/29/2023 0741 to 08/29/2023 1155       Date/Time Order Dose Route Action     08/29/2023 0805 EDT sodium chloride 0.9 % bolus 1,000 mL 1,000 mL Intravenous New Bag     08/29/2023 0929 EDT magnesium sulfate IVPB (premix) SOLN 1 g 1 g Intravenous New Bag     08/29/2023 0848 EDT iohexol (OMNIPAQUE) 350 MG/ML injection (SINGLE-DOSE) 85 mL 85 mL Intravenous Given     08/29/2023 7850 EDT aspirin tablet 325 mg 325 mg Oral Given          Present on Admission:  • Hypertension      Admitting Diagnosis: Hypomagnesemia [E83.42]  Slurred speech [R47.81]  HTN (hypertension) [I10]  Stroke (cerebrum) (HCC) [I63.9]  Dysarthria [R47.1]     Age/Sex: 68 y.o. male     Admission Orders: SCDs; neuro checks; I/S; dysphagia diet    Scheduled Medications:  aspirin, 81 mg, Oral, Daily  atorvastatin, 40 mg, Oral, Daily With Dinner  enoxaparin, 40 mg, Subcutaneous, Daily  insulin detemir, 20 Units, Subcutaneous, Q12H PHUC  insulin lispro, 1-6 Units, Subcutaneous, 4x Daily (AC & HS)  levothyroxine, 125 mcg, Oral, Daily  losartan, 50 mg, Oral, Daily  verapamil, 120 mg, Oral, Daily      Continuous IV Infusions:  sodium chloride, 50 mL/hr, Intravenous, Continuous      PRN Meds: none         IP CONSULT TO NEUROLOGY  IP CONSULT TO CASE MANAGEMENT  IP CONSULT TO NUTRITION SERVICES  IP CONSULT TO GASTROENTEROLOGY    Network Utilization Review Department  ATTENTION: Please call with any questions or concerns to [phone] and carefully listen to the prompts so that you are directed to the right person. All voicemails are confidential.  Mary Lino all requests for admission clinical reviews, approved or denied determinations and any other requests to dedicated fax number below belonging to the campus where the patient is receiving treatment.  List of dedicated fax numbers for the Facilities:  [place] DENIALS (Administrative/Medical Necessity) [phone] [address] [facility] (Maternity/NICU/Pediatrics) [phone]   [address] [place] [address] Drive [phone]   [facility] [address] [facility] [phone] [address] [phone]   [address] [facility] [address] [phone]

## 2023-08-30 NOTE — PHYSICAL THERAPY NOTE
PHYSICAL THERAPY EVALUATION  NAME:  Jing Ware  DATE: 08/30/23    AGE:   68 y.o. Mrn:   [id number]  ADMIT DX:  Hypomagnesemia [E83.42]  Slurred speech [R47.81]  HTN (hypertension) [I10]  Stroke (cerebrum) ([address]) [I63.9]  Dysarthria [R47.1]    Past Medical History:   Diagnosis Date   • Diabetes mellitus ([address])    • Hypertension    • L2 vertebral fracture ([address])    • Stricture esophagus      Length Of Stay: 1  Performed at least 2 patient identifiers during session: Name and Birthday  PHYSICAL THERAPY EVALUATION :    08/30/23 0742   PT Last Visit   PT Visit Date 08/30/23   Note Type   Note type Evaluation   Pain Assessment   Pain Assessment Tool 0-10   Pain Score No Pain   Restrictions/Precautions   Other Precautions Chair Alarm; Bed Alarm; Fall Risk;Multiple lines;Telemetry   Home Living   Type of Home House  (12 YOLANDA B HRs, but only able to use one side at a time)   Home Layout Two level;Bed/bath upstairs;1/2 bath on main level  (has been sleeping on 1st floor, stair glide to 2nd floor full bathroom)   Bathroom Shower/Tub Walk-in shower   Bathroom Toilet Raised   Bathroom Equipment Shower chair   Home Equipment Cane;Walker  (typically uses spc, reports using his late wife's walker the last week or so)   Additional Comments Reports living in a 2Sh with son living next door. Has 12 YOLANDA B HRs and uses cane for mobility, but was using his late wife's RW last week. Prior Function   Level of [place] Independent with ADLs; Independent with functional mobility; Needs assistance with IADLS   Lives With Alone   Receives Help From Family   IADLs Family/Friend/Other provides transportation; Family/Friend/Other provides meals; Family/Friend/Other provides medication management  (pt reports driving "a little", family brings him meals he can heat up if needed)   Falls in the last 6 months 1 to 4   Comments Reports being independent with mobility, ADLs and has assistance for IADLs.    General   Additional Pertinent History MRI brain showing: Small acute juxtacortical white matter infarct within the right posterior frontal lobe, series 3 image 20 without mass effect or hemorrhagic transformation. Advanced chronic microangiopathic change elsewhere within the brain parenchyma. Chronic microhemorrhages within the left temporal occipital junction and left posterior medial cerebellum. No acute hemorrhage. Cognition   Orientation Level Oriented X4   Following Commands Follows one step commands with increased time or repetition   Comments inc cues at times. pt with slurred speech and difficulty word finding at times wiht pt noticeably frustrated   Subjective   Subjective "I am self sufficient"   RLE Assessment   RLE Assessment WFL  (4-/5 except hip flexion 3/5)   LLE Assessment   LLE Assessment WFL  (4-/5)   Coordination   Rapid Alternating Movements Intact   Light Touch   RLE Light Touch Grossly intact   LLE Light Touch Grossly intact   Bed Mobility   Supine to Sit   (SBA)   Additional items Increased time required;Verbal cues   Additional Comments pt attempting to use bedrail on right when performing bed mobility to his left unsuccessfully. cues for technique and hand placement. with verbal cues for hand placement and technque, able to achieve sitting at EOB wiht SBA with inc time and effort   Transfers   Sit to Stand   (steadying assistance)   Additional items Increased time required;Verbal cues   Stand to Sit   (steadying assistance)   Additional items Increased time required;Verbal cues   Stand pivot 4  Minimal assistance   Additional items Assist x 1; Increased time required;Verbal cues   Additional Comments use of spc. sit<>stand with steadying assistance with wide NAOMY and inc time. spt with minAx1 wiht manual cues for balance and wt shifting. cues for turning completely prior to sitting   Ambulation/Elevation   Gait pattern Wide NAOMY; Improper Weight shift;Decreased foot clearance; Short stride; Excessively slow   Gait Assistance (min/modAx1)   Additional items Assist x 1;Verbal cues   Assistive Device [facility]   Distance ambulated 24'x1 with spc with wide NAOMY, dec foot clearance and step length and cues for inc step length., pt guarded reaching for external support, manual cues for balance and wt shfiting. Slight ataxia noted left LE > right LE with ambulation   Balance   Static Sitting Good   Dynamic Sitting Fair +   Static Standing Fair -   Dynamic Standing Poor +   Ambulatory Poor +   Activity Tolerance   Activity Tolerance Patient limited by fatigue   Medical Staff Made Aware Marybeth ALVA   Nurse Made Aware RNScott   Assessment   Prognosis Good   Problem List Decreased strength;Decreased endurance; Impaired balance;Decreased mobility; Impaired judgement;Decreased safety awareness; Obesity   Barriers to Discharge Decreased caregiver support; Inaccessible home environment   Barriers to Discharge Comments lives alone, requires assistance wiht mobility   Goals   Patient Goals "Go home"   [facility] Expiration Date 09/13/23   PT Treatment Day 1   Plan   Treatment/Interventions ADL retraining;Functional transfer training;LE strengthening/ROM; Elevations; Therapeutic exercise; Endurance training;Equipment eval/education;Patient/family training;Bed mobility;Gait training; Compensatory technique education;Spoke to nursing;Spoke to case management;OT   PT Frequency 3-5x/wk   Recommendation   PT Discharge Recommendation Post acute rehabilitation services  (acute inpatient rehab)   Equipment Recommended   (TBD by rehab)   AM-PAC Basic Mobility Inpatient   Turning in Flat Bed Without Bedrails 3   Lying on Back to Sitting on Edge of Flat Bed Without Bedrails 3   Moving Bed to Chair 3   Standing Up From Chair Using Arms 3   Walk in Room 3   Climb 3-5 Stairs With Railing 1   Basic Mobility Inpatient Raw Score 16   Basic Mobility Standardized Score 38.32   Highest Level Of Mobility   JH-HLM Goal 5: Stand one or more mins   JH-HLM Achieved 7: Walk 25 feet or more Additional Treatment Session   Start Time 0758   End Time 0808   Treatment Assessment Pt tolerated session fairly well. He was able to ambulate with slight decrease in assistance with RW comapred to spc but was able to ambulate increased distance. he requires assistance to stand from low surfaces. He is is limite dby decreased strength, balance, endurance. He will continue to benefit from PT services to maximize LOF. Equipment Use intiiated use of RW. sit to stand form low surface with miNAx1 with inc time and cues for hand placement. spt with RW with minAx1 with manual cues for RW management and verbal cues for turning completely with RW prior to sitting and staying wihtin NAOMY of RW. ambulated 48'x1 with RW with minimal to steadying assistance at best with mod verbal ues for inc step length and foot clearance and to stay wihtin NAOMY of RW. manual cues for RW management at times. pt with knee buckling 1x with minAx1 to recover. discussed reocmmendation for post acute rehab. patient verbalized understanding and is in agreement. End of Consult   Patient Position at End of Consult Bedside chair;Bed/Chair alarm activated; All needs within reach       Pt requires PT/OT co-eval due to medical complexity, signficant assistance with mobility and/or cognitive-behavioral impairments. (Please find full objective findings from PT assessment regarding body systems outlined above). Assessment: Pt is a 68 y.o. male seen for PT evaluation s/p admission to [address] [place] [address] on 8/29/2023 with Acute CVA (cerebrovascular accident) [facility]). Order placed for PT services.   Upon evaluation: Pt is presenting with impaired functional mobility due to decreased strength, decreased endurance, impaired balance, impaired coordination, gait deviations, decreased safety awareness, impaired judgment, and fall risk requiring stand by assistance for bed mobility, steadying to minimal assistance for transfers and minimal to moderate assistance for ambulation with spc. Pt's clinical presentation is currently unpredictable given the functional mobility deficits above, especially weakness, decreased endurance, impaired coordination, gait deviations, decreased activity tolerance, decreased functional mobility tolerance and decreased safety awareness, coupled with fall risks as indicated by -PAC 6-Clicks: 17/92 as well as hx of falls, impaired balance, polypharmacy, decreased safety awareness and obesity and combined with medical complications of abnormal blood sugars, need for input for mobility technique/safety and Small acute juxtacortical white matter infarct within the right posterior frontal lobe. Pt's PMHx and comorbidities that may affect physical performance and progress include: CKD, DM, HTN, obesity and h/o CVA, L2 vertebral fracture. Personal factors affecting pt at time of IE include: inaccessible home environment, step(s) to enter environment, limited home support, inability to perform IADLs, inability to perform ADLs, inability to navigate level surfaces without external assistance, inability to ambulate household distances and recent fall(s)/fall history. Pt will benefit from continued skilled PT services to address deficits as defined above and to maximize level of functional mobility to facilitate return toward PLOF and improved QOL. From PT/mobility standpoint, recommendation at time of d/c would be post acute rehab (PAR) pending progress in order to reduce fall risk and maximize pt's functional independence and consistency with mobility in order to facilitate return to PLOF. Recommend trial with walker next 1-2 sessions and ther ex next 1-2 sessions. The patient's AM-[facility] Basic Mobility Inpatient Short Form Raw Score is 16. A Raw score of less than or equal to 16 suggests the patient may benefit from discharge to post-acute rehabilitation services.  Please also refer to the recommendation of the Physical Therapist for safe discharge planning. Goals: Pt will: Perform bed mobility tasks with modified Independent to reposition in bed and prepare for transfers. Pt will perform transfers with modified Independent to decrease burden of care, decrease risk for falls and improve activity tolerance and prepare for ambulation. Pt will ambulate with LRAD for >/= 150' with  modified Independent  to decrease burden of care, decrease risk for falls and improve activity tolerance and to access home environment. Pt will complete 1 step with LRAD and >/= 12 steps with unilateral handrail with supervision to return to home with YOLANDA, decrease risk for falls, improve activity tolerance and improve gait quality. Pt will participate in objective balance assessment to determine baseline fall risk. Pt will participate in SSWS assessment to determine level of mobility. Pt will increase B LE strength >/= 1/2 MMT grade to facilitate functional mobility.       Sp [place], PT,DPT

## 2023-08-30 NOTE — PLAN OF CARE
Problem: PHYSICAL THERAPY ADULT  Goal: Performs mobility at highest level of function for planned discharge setting. See evaluation for individualized goals. Description: Treatment/Interventions: ADL retraining, Functional transfer training, LE strengthening/ROM, Elevations, Therapeutic exercise, Endurance training, Equipment eval/education, Patient/family training, Bed mobility, Gait training, Compensatory technique education, Spoke to nursing, Spoke to case management, OT  Equipment Recommended:  (TBD by rehab)       See flowsheet documentation for full assessment, interventions and recommendations. Note: Prognosis: Good  Problem List: Decreased strength, Decreased endurance, Impaired balance, Decreased mobility, Impaired judgement, Decreased safety awareness, Obesity  Assessment: Pt is a 68 y.o. male seen for PT evaluation s/p admission to [address] [place] [address] on 8/29/2023 with Acute CVA (cerebrovascular accident) [facility]). Order placed for PT services. Upon evaluation: Pt is presenting with impaired functional mobility due to decreased strength, decreased endurance, impaired balance, impaired coordination, gait deviations, decreased safety awareness, impaired judgment, and fall risk requiring stand by assistance for bed mobility, steadying to minimal assistance for transfers and minimal to moderate assistance for ambulation with spc.  Pt's clinical presentation is currently unpredictable given the functional mobility deficits above, especially weakness, decreased endurance, impaired coordination, gait deviations, decreased activity tolerance, decreased functional mobility tolerance and decreased safety awareness, coupled with fall risks as indicated by AM-PAC 6-Clicks: 61/28 as well as hx of falls, impaired balance, polypharmacy, decreased safety awareness and obesity and combined with medical complications of abnormal blood sugars, need for input for mobility technique/safety and Small acute juxtacortical white matter infarct within the right posterior frontal lobe. Pt's PMHx and comorbidities that may affect physical performance and progress include: CKD, DM, HTN, obesity and h/o CVA, L2 vertebral fracture. Personal factors affecting pt at time of IE include: inaccessible home environment, step(s) to enter environment, limited home support, inability to perform IADLs, inability to perform ADLs, inability to navigate level surfaces without external assistance, inability to ambulate household distances and recent fall(s)/fall history. Pt will benefit from continued skilled PT services to address deficits as defined above and to maximize level of functional mobility to facilitate return toward PLOF and improved QOL. From PT/mobility standpoint, recommendation at time of d/c would be post acute rehab ([facility]) pending progress in order to reduce fall risk and maximize pt's functional independence and consistency with mobility in order to facilitate return to PLOF. Recommend trial with walker next 1-2 sessions and ther ex next 1-2 sessions. Barriers to Discharge: Decreased caregiver support, Inaccessible home environment  Barriers to Discharge Comments: lives alone, requires assistance wiht mobility  PT Discharge Recommendation: Post acute rehabilitation services (acute inpatient rehab)    See flowsheet documentation for full assessment.

## 2023-08-30 NOTE — PLAN OF CARE
Problem: PHYSICAL THERAPY ADULT  Goal: Performs mobility at highest level of function for planned discharge setting. See evaluation for individualized goals. Description: Treatment/Interventions: ADL retraining, Functional transfer training, LE strengthening/ROM, Elevations, Therapeutic exercise, Endurance training, Equipment eval/education, Patient/family training, Bed mobility, Gait training, Compensatory technique education, Spoke to nursing, Spoke to case management, OT  Equipment Recommended:  (TBD by rehab)       See flowsheet documentation for full assessment, interventions and recommendations. 9/89/0668 2123 by Pricila Barbosa PT  Note: Prognosis: Good  Problem List: Decreased strength, Decreased endurance, Impaired balance, Decreased mobility, Impaired judgement, Decreased safety awareness, Obesity  Pt tolerated session fairly well. He was able to ambulate with slight decrease in assistance with RW comapred to [facility] but was able to ambulate increased distance. he requires assistance to stand from low surfaces. He is is limite dby decreased strength, balance, endurance. He will continue to benefit from PT services to maximize LOF. Barriers to Discharge: Decreased caregiver support, Inaccessible home environment  Barriers to Discharge Comments: lives alone, requires assistance wiht mobility  PT Discharge Recommendation: Post acute rehabilitation services (acute inpatient rehab)    See flowsheet documentation for full assessment.

## 2023-08-31 ENCOUNTER — APPOINTMENT (INPATIENT)
Dept: NON INVASIVE DIAGNOSTICS | Facility: HOSPITAL | Age: 78
DRG: 066 | End: 2023-08-31
Payer: COMMERCIAL

## 2023-08-31 ENCOUNTER — APPOINTMENT (INPATIENT)
Dept: RADIOLOGY | Facility: HOSPITAL | Age: 78
DRG: 066 | End: 2023-08-31
Payer: COMMERCIAL

## 2023-08-31 PROBLEM — E87.1 HYPONATREMIA: Status: RESOLVED | Noted: 2023-08-29 | Resolved: 2023-08-31

## 2023-08-31 LAB
GLUCOSE SERPL-MCNC: 189 MG/DL (ref 65–140)
GLUCOSE SERPL-MCNC: 198 MG/DL (ref 65–140)
GLUCOSE SERPL-MCNC: 201 MG/DL (ref 65–140)
GLUCOSE SERPL-MCNC: 296 MG/DL (ref 65–140)
SL CV LV EF: 70

## 2023-08-31 PROCEDURE — 97116 GAIT TRAINING THERAPY: CPT

## 2023-08-31 PROCEDURE — 97110 THERAPEUTIC EXERCISES: CPT

## 2023-08-31 PROCEDURE — 74220 X-RAY XM ESOPHAGUS 1CNTRST: CPT

## 2023-08-31 PROCEDURE — 93325 DOPPLER ECHO COLOR FLOW MAPG: CPT | Performed by: INTERNAL MEDICINE

## 2023-08-31 PROCEDURE — 93308 TTE F-UP OR LMTD: CPT

## 2023-08-31 PROCEDURE — 97535 SELF CARE MNGMENT TRAINING: CPT

## 2023-08-31 PROCEDURE — 99232 SBSQ HOSP IP/OBS MODERATE 35: CPT

## 2023-08-31 PROCEDURE — 93321 DOPPLER ECHO F-UP/LMTD STD: CPT | Performed by: INTERNAL MEDICINE

## 2023-08-31 PROCEDURE — 97530 THERAPEUTIC ACTIVITIES: CPT

## 2023-08-31 PROCEDURE — 93308 TTE F-UP OR LMTD: CPT | Performed by: INTERNAL MEDICINE

## 2023-08-31 PROCEDURE — 82948 REAGENT STRIP/BLOOD GLUCOSE: CPT

## 2023-08-31 RX ORDER — INSULIN LISPRO 100 [IU]/ML
1-6 INJECTION, SOLUTION INTRAVENOUS; SUBCUTANEOUS
Status: DISCONTINUED | OUTPATIENT
Start: 2023-08-31 | End: 2023-09-01

## 2023-08-31 RX ORDER — INSULIN LISPRO 100 [IU]/ML
1-6 INJECTION, SOLUTION INTRAVENOUS; SUBCUTANEOUS EVERY 6 HOURS SCHEDULED
Status: DISCONTINUED | OUTPATIENT
Start: 2023-08-31 | End: 2023-08-31

## 2023-08-31 RX ORDER — PANTOPRAZOLE SODIUM 40 MG/1
40 TABLET, DELAYED RELEASE ORAL
Status: DISCONTINUED | OUTPATIENT
Start: 2023-08-31 | End: 2023-09-05 | Stop reason: HOSPADM

## 2023-08-31 RX ORDER — FLUTICASONE PROPIONATE 220 UG/1
2 AEROSOL, METERED RESPIRATORY (INHALATION) EVERY 12 HOURS SCHEDULED
Status: DISCONTINUED | OUTPATIENT
Start: 2023-08-31 | End: 2023-09-05 | Stop reason: HOSPADM

## 2023-08-31 RX ADMIN — ENOXAPARIN SODIUM 40 MG: 40 INJECTION SUBCUTANEOUS at 13:16

## 2023-08-31 RX ADMIN — PANTOPRAZOLE SODIUM 40 MG: 40 TABLET, DELAYED RELEASE ORAL at 17:26

## 2023-08-31 RX ADMIN — INSULIN LISPRO 2 UNITS: 100 INJECTION, SOLUTION INTRAVENOUS; SUBCUTANEOUS at 21:08

## 2023-08-31 RX ADMIN — LEVOTHYROXINE SODIUM 125 MCG: 125 TABLET ORAL at 08:04

## 2023-08-31 RX ADMIN — FLUTICASONE PROPIONATE 2 PUFF: 220 AEROSOL, METERED RESPIRATORY (INHALATION) at 21:05

## 2023-08-31 RX ADMIN — INSULIN LISPRO 2 UNITS: 100 INJECTION, SOLUTION INTRAVENOUS; SUBCUTANEOUS at 11:37

## 2023-08-31 RX ADMIN — ASPIRIN 81 MG: 81 TABLET, COATED ORAL at 08:04

## 2023-08-31 RX ADMIN — FLUTICASONE PROPIONATE 2 PUFF: 220 AEROSOL, METERED RESPIRATORY (INHALATION) at 10:00

## 2023-08-31 RX ADMIN — ATORVASTATIN CALCIUM 40 MG: 40 TABLET, FILM COATED ORAL at 17:26

## 2023-08-31 RX ADMIN — INSULIN DETEMIR 20 UNITS: 100 INJECTION, SOLUTION SUBCUTANEOUS at 10:00

## 2023-08-31 RX ADMIN — INSULIN DETEMIR 20 UNITS: 100 INJECTION, SOLUTION SUBCUTANEOUS at 21:08

## 2023-08-31 RX ADMIN — INSULIN LISPRO 4 UNITS: 100 INJECTION, SOLUTION INTRAVENOUS; SUBCUTANEOUS at 17:26

## 2023-08-31 RX ADMIN — FLUTICASONE FUROATE 1 PUFF: 100 POWDER RESPIRATORY (INHALATION) at 10:00

## 2023-08-31 RX ADMIN — LOSARTAN POTASSIUM 50 MG: 50 TABLET, FILM COATED ORAL at 08:04

## 2023-08-31 RX ADMIN — VERAPAMIL HYDROCHLORIDE 120 MG: 120 TABLET, FILM COATED, EXTENDED RELEASE ORAL at 08:04

## 2023-08-31 NOTE — PROGRESS NOTES
[facility]  Progress Note  Name: Dimas Herrmann  MRN: [id number]  Unit/Bed#: -01 I Date of Admission: 8/29/2023   Date of Service: 8/31/2023 I Hospital Day: 2    Assessment/Plan   * Acute CVA (cerebrovascular accident) [facility])  Assessment & Plan  Presented with 5-7 days history of  slurred speech. Admitted to stroke pathway. 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 · CTA head/neck: Negative for large vessel occlusion, dissection, or aneurysm. 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 · Status post 325 mg ASA on arrival, continue with 81 mg daily and home dose satin   · MRI brain: Small acute juxtacortical white matter infarct within the right posterior frontal lobe, without mass effect or hemorrhagic transformation. Advanced chronic microangiopathic change elsewhere within the brain parenchyma. Chronic microhemorrhages within the left temporal occipital junction and left posterior medial cerebellum. No acute hemorrhage. · Echocardiogram normal  · , TSH normal  · A1c 8.4. improved from 9.4  · Neurology consulted and recommending repeat echo as well as loop recorder.  Will also need follow up in 6 weeks with neurovascular  · PT/OT recommending acute rehab  · Speech recommending GI consult for possible esophageal dilation    Eosinophilic esophagitis  Assessment & Plan  · Hx of esophageal stricture requiring dilation  · Speech consulted as patient having difficulty swallowing  · GI consulted and recommended starting fluticasone and protonix BID, also doing esophagram. Also recommending outpatient follow up and possible EGD in a few weeks after monitoring on eosinophilic esophagitis medications  · Esophagram pending    Hyperlipidemia  Assessment & Plan  Lipid panel: cholesterol 159, ,    · Continued on home dose pravastatin     Penetrating atherosclerotic ulcer of aorta [facility])  Assessment & Plan  Incidental finding on CTA head/neck: 2.2 cm penetrating atheromatous ulcer along lateral wall of aortic arch projecting inferolaterally   · Findings discussed with patient and family at time of admission   · Vascular surgery referral on discharge   · Continue with ASA and statin     Thyroid nodule  Assessment & Plan  Incidental finding on CTA head/neck: 1.7 cm peripherally calcified nodule in right thyroid lobe  · Findings discussed with patient and family on admission   · Outpatient follow-up for US     CKD (chronic kidney disease)  Assessment & Plan  Lab Results   Component Value Date    EGFR 63 08/30/2023    EGFR 52 08/29/2023    EGFR 65 02/15/2023    CREATININE 1.12 08/30/2023    CREATININE 1.31 (H) 08/29/2023    CREATININE 1.08 02/15/2023     · No documented history of CKD but baseline creatinine appears to be 1.1-1.3 per chart review   · UA reviewed and unremarkable   · Provide gentle IV fluids overnight and monitor for improvement   · Avoid nephrotoxins and hypotension   · Continue home ACE/ARB for now  · Trend BMP    Hypothyroidism  Assessment & Plan  · TSH elevated 4.97, free T4   · Continue home dose levothyroxine for now     Hypertension  Assessment & Plan  · Hypertensive urgency on arrival, now improved   · Continue home dose lisinopril, losartan and hospital formulary verapamil   · Monitor routinely     Type 2 diabetes mellitus with hyperglycemia, with long-term current use of insulin [facility])  Assessment & Plan  Lab Results   Component Value Date    HGBA1C 8.4 (H) 08/29/2023       Recent Labs     08/30/23  1124 08/30/23  1625 08/30/23  2113 08/31/23  0802   POCGLU 167* 193* 246* 189*       Blood Sugar Average: Last 72 hrs:  (P) 183.6     · Poorly controlled evidenced by most recent A1c, follow-up repeat   · Home insulin regimen: Levimir 72 units twice daily + SSI with meals   · Start decreased dose Levimir 45 units twice daily + SSI   · Monitor accuchecks and adjust as needed · Diabetic diet     Hyponatremia-resolved as of 2023  Assessment & Plan  Na 131 on arrival, component of pseudohyponatremia in the setting of hyperglycemia   · Provide gentle IV fluids with normal saline   · Repeat BMP in AM           VTE Pharmacologic Prophylaxis: VTE Score: 9 High Risk (Score >/= 5) - Pharmacological DVT Prophylaxis Ordered: enoxaparin (Lovenox). Sequential Compression Devices Ordered. Patient Centered Rounds: I performed bedside rounds with nursing staff today. Discussions with Specialists or Other Care Team Provider: None    Education and Discussions with Family / Patient: Attempted to update  (daughter in law) via phone. Left voicemail. Total Time Spent on Date of Encounter in care of patient: 45 minutes This time was spent on one or more of the following: performing physical exam; counseling and coordination of care; obtaining or reviewing history; documenting in the medical record; reviewing/ordering tests, medications or procedures; communicating with other healthcare professionals and discussing with patient's family/caregivers. Current Length of Stay: 2 day(s)  Current Patient Status: Inpatient   Certification Statement: The patient will continue to require additional inpatient hospital stay due to acute cva  Discharge Plan: Anticipate discharge in 24-48 hrs to rehab facility. Code Status: Level 3 - DNAR and DNI    Subjective:   Patient states that he is feeling good today. Denies chest pain or shortness of breath. Patient agreeable to current plan. States that he has been able to swallow fine, as long as he does not need to fast.    Objective:     Vitals:   Temp (24hrs), Av.6 °F (36.4 °C), Min:97.2 °F (36.2 °C), Max:97.9 °F (36.6 °C)    Temp:  [97.2 °F (36.2 °C)-97.9 °F (36.6 °C)] 97.6 °F (36.4 °C)  HR:  [64-78] 64  Resp:  [18-22] 22  BP: (101-151)/(61-73) 151/73  SpO2:  [93 %-98 %] 98 %  Body mass index is 33.19 kg/m².      Input and Output Summary (last 24 hours): Intake/Output Summary (Last 24 hours) at 8/31/2023 1243  Last data filed at 8/31/2023 1001  Gross per 24 hour   Intake 240 ml   Output 1050 ml   Net -810 ml       Physical Exam:   Physical Exam  Vitals reviewed. Constitutional:       General: He is not in acute distress. Appearance: Normal appearance. He is obese. He is not ill-appearing. HENT:      Head: Normocephalic and atraumatic. Nose: Nose normal.      Mouth/Throat:      Mouth: Mucous membranes are moist.      Pharynx: Oropharynx is clear. Eyes:      Extraocular Movements: Extraocular movements intact. Conjunctiva/sclera: Conjunctivae normal.   Cardiovascular:      Rate and Rhythm: Normal rate and regular rhythm. Pulses: Normal pulses. Heart sounds: Normal heart sounds. No murmur heard. Pulmonary:      Effort: Pulmonary effort is normal. No respiratory distress. Breath sounds: Normal breath sounds. No wheezing. Abdominal:      General: Abdomen is flat. Bowel sounds are normal. There is no distension. Palpations: Abdomen is soft. Tenderness: There is no abdominal tenderness. There is no guarding. Musculoskeletal:         General: Normal range of motion. Cervical back: Normal range of motion. Right lower leg: No edema. Left lower leg: No edema. Skin:     General: Skin is warm. Neurological:      General: No focal deficit present. Mental Status: He is alert and oriented to person, place, and time. Mental status is at baseline. Motor: No weakness. Comments: Mild dysarthria   Psychiatric:         Mood and Affect: Mood normal.         Behavior: Behavior normal.         Thought Content:  Thought content normal.         Judgment: Judgment normal.          Additional Data:     Labs:  Results from last 7 days   Lab Units 08/30/23  0508   WBC Thousand/uL 8.20   HEMOGLOBIN g/dL 15.5   HEMATOCRIT % 45.1   PLATELETS Thousands/uL 183     Results from last 7 days Lab Units 08/30/23  0508   SODIUM mmol/L 135   POTASSIUM mmol/L 3.7   CHLORIDE mmol/L 104   CO2 mmol/L 24   BUN mg/dL 17   CREATININE mg/dL 1.12   ANION GAP mmol/L 7   CALCIUM mg/dL 8.6   ALBUMIN g/dL 3.5   TOTAL BILIRUBIN mg/dL 1.38*   ALK PHOS U/L 59   ALT U/L 10   AST U/L 9*   GLUCOSE RANDOM mg/dL 121     Results from last 7 days   Lab Units 08/29/23  0803   INR  0.91     Results from last 7 days   Lab Units 08/31/23  1135 08/31/23  0802 08/30/23  2113 08/30/23  1625 08/30/23  1124 08/30/23  0510 08/30/23  0025 08/29/23  2052 08/29/23  1639 08/29/23  1245 08/29/23  0753   POC GLUCOSE mg/dl 198* 189* 246* 193* 167* 117 116 147* 178* 260* 223*     Results from last 7 days   Lab Units 08/29/23  0803   HEMOGLOBIN A1C % 8.4*           Lines/Drains:  Invasive Devices     Peripheral Intravenous Line  Duration           Peripheral IV 08/29/23 Dorsal (posterior); Left;Proximal Forearm 2 days                  Telemetry:  Telemetry Orders (From admission, onward)             24 Hour Telemetry Monitoring  Continuous x 24 Hours (Telem)        Expiring   Question:  Reason for 24 Hour Telemetry  Answer:  TIA/Suspected CVA/ Confirmed CVA  Comment:  continuous telemetry monitoring through this hospitalization                 Telemetry Reviewed: Normal Sinus Rhythm  Indication for Continued Telemetry Use: No indication for continued use. Will discontinue.               Imaging: Reviewed radiology reports from this admission including: MRI brain    Recent Cultures (last 7 days):         Last 24 Hours Medication List:   Current Facility-Administered Medications   Medication Dose Route Frequency Provider Last Rate   • aspirin  81 mg Oral Daily Rebeca Dominique PA-C     • atorvastatin  40 mg Oral Daily With Vitaliy Otero MD     • enoxaparin  40 mg Subcutaneous Daily Rebeca Dominique PA-C     • fluticasone  2 puff Inhalation Q12H [facility] Jevon Stark DO     • insulin detemir  20 Units Subcutaneous Q12H [facility] Giana García CRNP     • insulin lispro  1-6 Units Subcutaneous 4x Daily (AC & HS) Lisa Garcia PA-C     • levothyroxine  125 mcg Oral Daily Rebeca E Held, TALHA     • losartan  50 mg Oral Daily Rebeca E Held, TALHA     • pantoprazole  40 mg Oral BID AC Lisa Garcia PA-C     • verapamil  120 mg Oral Daily Rebeca E Held, TALHA          Today, Patient Was Seen By: Demetrio Joshua PA-C    **Please Note: This note may have been constructed using a voice recognition system. **

## 2023-08-31 NOTE — PLAN OF CARE
Problem: MOBILITY - ADULT  Goal: Maintain or return to baseline ADL function  Description: INTERVENTIONS:  -  Assess patient's ability to carry out ADLs; assess patient's baseline for ADL function and identify physical deficits which impact ability to perform ADLs (bathing, care of mouth/teeth, toileting, grooming, dressing, etc.)  - Assess/evaluate cause of self-care deficits   - Assess range of motion  - Assess patient's mobility; develop plan if impaired  - Assess patient's need for assistive devices and provide as appropriate  - Encourage maximum independence but intervene and supervise when necessary  - Involve family in performance of ADLs  - Assess for home care needs following discharge   - Consider OT consult to assist with ADL evaluation and planning for discharge  - Provide patient education as appropriate  Outcome: Progressing  Goal: Maintains/Returns to pre admission functional level  Description: INTERVENTIONS:  - Perform BMAT or MOVE assessment daily.   - Set and communicate daily mobility goal to care team and patient/family/caregiver. - Collaborate with rehabilitation services on mobility goals if consulted  - Perform Range of Motion 2 times a day. - Reposition patient every 2 hours.   - Dangle patient 2 times a day  - Stand patient 2 times a day  - Ambulate patient 2 times a day  - Out of bed to chair 2 times a day   - Out of bed for meals 2 times a day  - Out of bed for toileting  - Record patient progress and toleration of activity level   Outcome: Progressing     Problem: PAIN - ADULT  Goal: Verbalizes/displays adequate comfort level or baseline comfort level  Description: Interventions:  - Encourage patient to monitor pain and request assistance  - Assess pain using appropriate pain scale  - Administer analgesics based on type and severity of pain and evaluate response  - Implement non-pharmacological measures as appropriate and evaluate response  - Consider cultural and social influences on pain and pain management  - Notify physician/advanced practitioner if interventions unsuccessful or patient reports new pain  Outcome: Progressing     Problem: INFECTION - ADULT  Goal: Absence or prevention of progression during hospitalization  Description: INTERVENTIONS:  - Assess and monitor for signs and symptoms of infection  - Monitor lab/diagnostic results  - Monitor all insertion sites, i.e. indwelling lines, tubes, and drains  - Monitor endotracheal if appropriate and nasal secretions for changes in amount and color  - [place] appropriate cooling/warming therapies per order  - Administer medications as ordered  - Instruct and encourage patient and family to use good hand hygiene technique  - Identify and instruct in appropriate isolation precautions for identified infection/condition  Outcome: Progressing  Goal: Absence of fever/infection during neutropenic period  Description: INTERVENTIONS:  - Monitor WBC    Outcome: Progressing     Problem: SAFETY ADULT  Goal: Maintain or return to baseline ADL function  Description: INTERVENTIONS:  -  Assess patient's ability to carry out ADLs; assess patient's baseline for ADL function and identify physical deficits which impact ability to perform ADLs (bathing, care of mouth/teeth, toileting, grooming, dressing, etc.)  - Assess/evaluate cause of self-care deficits   - Assess range of motion  - Assess patient's mobility; develop plan if impaired  - Assess patient's need for assistive devices and provide as appropriate  - Encourage maximum independence but intervene and supervise when necessary  - Involve family in performance of ADLs  - Assess for home care needs following discharge   - Consider OT consult to assist with ADL evaluation and planning for discharge  - Provide patient education as appropriate  Outcome: Progressing  Goal: Maintains/Returns to pre admission functional level  Description: INTERVENTIONS:  - Perform BMAT or MOVE assessment daily.   - Set and communicate daily mobility goal to care team and patient/family/caregiver. - Collaborate with rehabilitation services on mobility goals if consulted  - Perform Range of Motion 2 times a day. - Reposition patient every 2 hours.   - Dangle patient 2 times a day  - Stand patient 2 times a day  - Ambulate patient 2 times a day  - Out of bed to chair 2 times a day   - Out of bed for meals 2 times a day  - Out of bed for toileting  - Record patient progress and toleration of activity level   Outcome: Progressing  Goal: Patient will remain free of falls  Description: INTERVENTIONS:  - Educate patient/family on patient safety including physical limitations  - Instruct patient to call for assistance with activity   - Consult OT/PT to assist with strengthening/mobility   - Keep Call bell within reach  - Keep bed low and locked with side rails adjusted as appropriate  - Keep care items and personal belongings within reach  - Initiate and maintain comfort rounds  - Make Fall Risk Sign visible to staff  - Offer Toileting every 2 Hours, in advance of need  - Initiate/Maintain bed/chair alarm  - Obtain necessary fall risk management equipment: na  - Apply yellow socks and bracelet for high fall risk patients  - Consider moving patient to room near nurses station  Outcome: Progressing     Problem: DISCHARGE PLANNING  Goal: Discharge to home or other facility with appropriate resources  Description: INTERVENTIONS:  - Identify barriers to discharge w/patient and caregiver  - Arrange for needed discharge resources and transportation as appropriate  - Identify discharge learning needs (meds, wound care, etc.)  - Arrange for interpretive services to assist at discharge as needed  - Refer to Case Management Department for coordinating discharge planning if the patient needs post-hospital services based on physician/advanced practitioner order or complex needs related to functional status, cognitive ability, or social support system  Outcome: Progressing     Problem: Knowledge Deficit  Goal: Patient/family/caregiver demonstrates understanding of disease process, treatment plan, medications, and discharge instructions  Description: Complete learning assessment and assess knowledge base. Interventions:  - Provide teaching at level of understanding  - Provide teaching via preferred learning methods  Outcome: Progressing     Problem: Neurological Deficit  Goal: Neurological status is stable or improving  Description: Interventions:  - Monitor and assess patient's level of consciousness, motor function, sensory function, and level of assistance needed for ADLs. - Monitor and report changes from baseline. Collaborate with interdisciplinary team to initiate plan and implement interventions as ordered. - Provide and maintain a safe environment. - Consider seizure precautions. - Consider fall precautions. - Consider aspiration precautions. - Consider bleeding precautions. Outcome: Progressing     Problem: Activity Intolerance/Impaired Mobility  Goal: Mobility/activity is maintained at optimum level for patient  Description: Interventions:  - Assess and monitor patient  barriers to mobility and need for assistive/adaptive devices. - Assess patient's emotional response to limitations. - Collaborate with interdisciplinary team and initiate plans and interventions as ordered. - Encourage independent activity per ability.  - Maintain proper body alignment. - Perform active/passive rom as tolerated/ordered. - Plan activities to conserve energy.  - Turn patient as appropriate  Outcome: Progressing     Problem: Communication Impairment  Goal: Ability to express needs and understand communication  Description: Assess patient's communication skills and ability to understand information. Patient will demonstrate use of effective communication techniques, alternative methods of communication and understanding even if not able to speak.      - Encourage communication and provide alternate methods of communication as needed. - Collaborate with case management/ for discharge needs. - Include patient/family/caregiver in decisions related to communication. Outcome: Progressing     Problem: Potential for Aspiration  Goal: Non-ventilated patient's risk of aspiration is minimized  Description: Assess and monitor vital signs, respiratory status, and labs (WBC). Monitor for signs of aspiration (tachypnea, cough, rales, wheezing, cyanosis, fever). - Assess and monitor patient's ability to swallow. - Place patient up in chair to eat if possible. - HOB up at 90 degrees to eat if unable to get patient up into chair.  - Supervise patient during oral intake. - Instruct patient/ family to take small bites. - Instruct patient/ family to take small single sips when taking liquids. - Follow patient-specific strategies generated by speech pathologist.  Outcome: Progressing  Goal: Ventilated patient's risk of aspiration is minimized  Description: Assess and monitor vital signs, respiratory status, airway cuff pressure, and labs (WBC). Monitor for signs of aspiration (tachypnea, cough, rales, wheezing, cyanosis, fever). - Elevate head of bed 30 degrees if patient has tube feeding.  - Monitor tube feeding. Outcome: Progressing     Problem: Nutrition  Goal: Nutrition/Hydration status is improving  Description: Monitor and assess patient's nutrition/hydration status for malnutrition (ex- brittle hair, bruises, dry skin, pale skin and conjunctiva, muscle wasting, smooth red tongue, and disorientation). Collaborate with interdisciplinary team and initiate plan and interventions as ordered. Monitor patient's weight and dietary intake as ordered or per policy. Utilize nutrition screening tool and intervene per policy. Determine patient's food preferences and provide high-protein, high-caloric foods as appropriate.      - Assist patient with eating.  - Allow adequate time for meals.  - Encourage patient to take dietary supplement as ordered. - Collaborate with clinical nutritionist.  - Include patient/family/caregiver in decisions related to nutrition.   Outcome: Progressing     Problem: Prexisting or High Potential for Compromised Skin Integrity  Goal: Skin integrity is maintained or improved  Description: INTERVENTIONS:  - Identify patients at risk for skin breakdown  - Assess and monitor skin integrity  - Assess and monitor nutrition and hydration status  - Monitor labs   - Assess for incontinence   - Turn and reposition patient  - Assist with mobility/ambulation  - Relieve pressure over bony prominences  - Avoid friction and shearing  - Provide appropriate hygiene as needed including keeping skin clean and dry  - Evaluate need for skin moisturizer/barrier cream  - Collaborate with interdisciplinary team   - Patient/family teaching  - Consider wound care consult   Outcome: Progressing

## 2023-08-31 NOTE — CASE MANAGEMENT
Case Management Discharge Planning Note    Patient name Jake Carreon  Location /-62 MRN [id number]  : 1945 Date 2023       Current Admission Date: 2023  Current Admission Diagnosis:Acute CVA (cerebrovascular accident) [facility])   Patient Active Problem List    Diagnosis Date Noted   • Eosinophilic esophagitis    • Acute CVA (cerebrovascular accident) ([address]) 2023   • Hypothyroidism 2023   • CKD (chronic kidney disease) 2023   • Thyroid nodule 2023   • Penetrating atherosclerotic ulcer of aorta ([address]) 2023   • Hyperlipidemia 2023   • Foreign body sensation in throat 2023   • Type 2 diabetes mellitus with hyperglycemia, with long-term current use of insulin ([address])    • Hypertension       LOS (days): 2  Geometric Mean LOS (GMLOS) (days): 2.90  Days to GMLOS:0.6     OBJECTIVE:  Risk of Unplanned Readmission Score: 10.89         Current admission status: Inpatient   Preferred Pharmacy:   [address] [place]  [address] [address]  Phone: [phone] Fax: [phone]    Primary Care Provider: Kalee Chapin MD    Primary Insurance: Israel [facility]  Secondary Insurance:     DISCHARGE DETAILS:    IMM Given (Date):: 23  IMM Given to[de-identified] Patient   spoke with pt and family at bedside. [facility] for [facility].

## 2023-08-31 NOTE — ASSESSMENT & PLAN NOTE
Lab Results   Component Value Date    HGBA1C 8.4 (H) 08/29/2023       Recent Labs     08/30/23  1124 08/30/23  1625 08/30/23  2113 08/31/23  0802   POCGLU 167* 193* 246* 189*       Blood Sugar Average: Last 72 hrs:  (P) 183.6     · Poorly controlled evidenced by most recent A1c, follow-up repeat   · Home insulin regimen: Levimir 72 units twice daily + SSI with meals   · Start decreased dose Levimir 45 units twice daily + SSI   · Monitor accuchecks and adjust as needed   · Diabetic diet

## 2023-08-31 NOTE — CASE MANAGEMENT
Case Management Progress Note    Patient name Javon Plants  Location /-59 MRN [id number]  : 1945 Date 2023       LOS (days): 2  Geometric Mean LOS (GMLOS) (days): 2.90  Days to GMLOS:0.6        OBJECTIVE:        Current admission status: Inpatient  Preferred Pharmacy:   [address] [place]  [address] [address]  Phone: [phone] Fax: [phone]    Primary Care Provider: Carolyn No MD    Primary Insurance: [employer]   Secondary Insurance:     PROGRESS NOTE: received confirmation from [facility] that they spoke with son and can submit for auth. Request placed to discharge support to place authorization request. Pt updated on same. Tentative discharge tomorrow if auth obtained.

## 2023-08-31 NOTE — ASSESSMENT & PLAN NOTE
Presented with 5-7 days history of  slurred speech. Admitted to stroke pathway. 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 · CTA head/neck: Negative for large vessel occlusion, dissection, or aneurysm. 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 · Status post 325 mg ASA on arrival, continue with 81 mg daily and home dose satin   · MRI brain: Small acute juxtacortical white matter infarct within the right posterior frontal lobe, without mass effect or hemorrhagic transformation. Advanced chronic microangiopathic change elsewhere within the brain parenchyma. Chronic microhemorrhages within the left temporal occipital junction and left posterior medial cerebellum. No acute hemorrhage. · Echocardiogram normal  · , TSH normal  · A1c 8.4. improved from 9.4  · Neurology consulted and recommending repeat echo as well as loop recorder.  Will also need follow up in 6 weeks with neurovascular  · PT/OT recommending acute rehab  · Speech recommending GI consult for possible esophageal dilation

## 2023-08-31 NOTE — ASSESSMENT & PLAN NOTE
Lab Results   Component Value Date    EGFR 63 08/30/2023    EGFR 52 08/29/2023    EGFR 65 02/15/2023    CREATININE 1.12 08/30/2023    CREATININE 1.31 (H) 08/29/2023    CREATININE 1.08 02/15/2023     · No documented history of CKD but baseline creatinine appears to be 1.1-1.3 per chart review   · UA reviewed and unremarkable   · Provide gentle IV fluids overnight and monitor for improvement   · Avoid nephrotoxins and hypotension   · Continue home ACE/ARB for now  · Trend BMP

## 2023-08-31 NOTE — PHYSICAL THERAPY NOTE
PHYSICAL THERAPY TREATMENT NOTE  NAME:  Jing Certain  DATE: 08/31/23    Length Of Stay: 2  Performed at least 2 patient identifiers during session: Name and Birthday    TREATMENT FLOW SHEET:      08/31/23 1449   PT Last Visit   PT Visit Date 08/31/23   Note Type   Note Type Treatment   Pain Assessment   Pain Assessment Tool 0-10   Pain Score No Pain   Restrictions/Precautions   Other Precautions Chair Alarm; Bed Alarm; Fall Risk   General   Chart Reviewed Yes   Response to Previous Treatment Patient with no complaints from previous session. Cognition   Orientation Level Oriented X4   Subjective   Subjective "I'm starving."   Bed Mobility   Additional Comments Pt sitting OOB in recliner chair at start and end of session with all needs within reach and posey alarm on. Transfers   Sit to Stand   (steadying assistance)   Additional items Assist x 1; Increased time required;Verbal cues   Stand to Sit   (steadying assistance)   Additional items Assist x 1; Increased time required;Verbal cues   Stand pivot 4  Minimal assistance   Additional items Assist x 1; Increased time required;Verbal cues   Toilet transfer   (steadying assistance)   Additional items Increased time required;Verbal cues;Standard toilet   Additional Comments use of RW. min cues for hand placement for safety with RW. sit<>stand with steadying assistance. min cues for upright posture. spt with RW with minAx1 with min cues for turnign completely with RW prior to sitting. toilet transfer x2 with steadying assistance with grab bar and inc time. min cues for technique. sitting on toilet able to thread boxer briefs on right LE with SBA for sitting balance, but required minAx1 to thread L LE. steadying to m,inAx1 for dynamic standing balance for clothing mangement up. standing at sink, reaching anteiroly outside NAOMY with steadying assistance with wide NAOMY. Ambulation/Elevation   Gait pattern Wide NAOMY; Forward Flexion;Decreased foot clearance; Short stride; Excessively slow; Ataxia; Step through pattern   Gait Assistance 4  Minimal assist   Additional items Assist x 1;Verbal cues   Assistive Device Rolling walker   Distance ambulated 48'x1 with RW with minAx1 with inc trunk flexion, decreased step length and foot clearance with mod cues for staying within NAOMY of RW and increased step length and foot clearance. then ambulated 25'x2 with RW with minimal to steadying assistance with min cues to stay within NAOMY of RW and inc foot clearance and step length. minimal assistance with turns with in lateral lean to left   Balance   Static Sitting Good   Dynamic Sitting Fair +   Static Standing Fair -   Dynamic Standing Poor +   Ambulatory Poor +   Endurance Deficit   Endurance Deficit Yes   Endurance Deficit Description min FAROOQ, fatigue. SpO2 >/= 96% throughout on room air. Activity Tolerance   Activity Tolerance Patient limited by fatigue   Nurse Made Aware Scott RODAS   Exercises   Hip Flexion Sitting;15 reps;AROM; Bilateral   Hip Adduction Sitting;15 reps;Bilateral  (isometric)   Knee AROM Long Arc Quad Sitting;15 reps;AROM; Bilateral   Ankle Pumps Sitting;15 reps;AROM; Bilateral   Balance training  repeated sit<>stand 5x, 6x with steadying assistance with min cues for proper technique, anterior wt shifting as pt pushed against chair during transition sit to stand. Assessment   Prognosis Good   Problem List Decreased strength;Decreased endurance; Impaired balance;Decreased mobility; Decreased coordination;Decreased safety awareness; Obesity   Barriers to Discharge Decreased caregiver support; Inaccessible home environment   Barriers to Discharge Comments requires assistance with mobility, lives alone, increased fall risk   Goals   Patient Goals "Get better"   PT Treatment Day 2   Plan   Treatment/Interventions ADL retraining;Functional transfer training;LE strengthening/ROM; Elevations; Therapeutic exercise; Endurance training;Patient/family training;Equipment eval/education; Bed mobility;Gait training; Compensatory technique education;Spoke to nursing   Progress Progressing toward goals   PT Frequency 3-5x/wk   Recommendation   PT Discharge Recommendation Post acute rehabilitation services  (acute inpatient rehab)   Equipment Recommended   (TBD by rehab)   Additional Comments pt plans to get recliner lift chair for home. pt's family present throughout family. AM-PAC Basic Mobility Inpatient   Turning in Flat Bed Without Bedrails 3   Lying on Back to Sitting on Edge of Flat Bed Without Bedrails 3   Moving Bed to Chair 3   Standing Up From Chair Using Arms 3   Walk in Room 3   Climb 3-5 Stairs With Railing 2   Basic Mobility Inpatient Raw Score 17   Basic Mobility Standardized Score 39.67   Highest Level Of Mobility   JH-HLM Goal 5: Stand one or more mins   JH-HLM Achieved 7: Walk 25 feet or more   Education   Education Provided Mobility training;Assistive device;Home exercise program   Patient Demonstrates acceptance/verbal understanding   End of Consult   Patient Position at End of Consult Bedside chair;Bed/Chair alarm activated; All needs within reach        The patient's AM-PAC Basic Mobility Inpatient Short Form Raw Score is 17. A Raw score of greater than 16 suggests the patient may benefit from discharge to home. Please also refer to the recommendation of the Physical Therapist for safe discharge planning. However, patient requiring minimal assistance with mobility for balance, increased fall risk and decreased endurance and strength. Recommending post acute rehab upon discharge. Pt tolerated session fairly well. He was able to ambulate increased distance and tolerated increased activity with sitting rest between all activities. He requires min cues for proper completion of LE TE and min cues for hand placement for safety with use of RW for transfers and min to mod verbal cues for improved gait pattern.  He was able to demonstrate goo carryover with improved RW use staying within NAOMY of RW as session and trials progressed. He is limited by decreased strength, balance, endurance. He will continue to benefit from PT services to maximize LOF.      Jorge Rios, PT,DPT

## 2023-08-31 NOTE — CONSULTS
Consultation - GI   Jayla Berry 68 y.o. male  [id number]  Unit/Bed:        Physician Requesting Consult:  Wilberto Mohr MD   Reason for Consult:     HPI:  Jayla Berry is a 68 y.o. male with history of LA Grade D esophagitis, eosinophilic esophagitis (on PPI BID as outpatient), uncontrolled DM, and HTN who presented on 8/29/2023 for the evaluation of slurred speech. Patient was ultimately diagnosed with a CVA. He is a poor historian and most information is taken from his chart. He underwent EGD in February 2023 for evaluation of dysphagia - he was found to have a food impaction as well as LA Grade D esophagitis. Biopsies were taken from the esophagus and confirmed the diagnosis of eosinophilic esophagitis. He is on PPI BID as an outpatient and has not had a repeat EGD since the diagnosis. States that he is able to eat most things, but has trouble with meats. He states he purposely avoids them or he eats smaller bites. Denies any weight loss from inability to eat. He states he is still able to eat what he wants for the most part. Past Medical History:    Past Medical History:   Diagnosis Date   • Diabetes mellitus ([address])    • Hypertension    • L2 vertebral fracture (HCC)    • Stricture esophagus         Past Surgical History:    Past Surgical History:   Procedure Laterality Date   • CATARACT EXTRACTION Bilateral    • CHOLECYSTECTOMY     • EGD          Social History:    Social History     Socioeconomic History   • Marital status:       Spouse name: Not on file   • Number of children: Not on file   • Years of education: Not on file   • Highest education level: Not on file   Occupational History   • Not on file   Tobacco Use   • Smoking status: Former   • Smokeless tobacco: Never   Vaping Use   • Vaping Use: Never used   Substance and Sexual Activity   • Alcohol use: Not Currently   • Drug use: Never   • Sexual activity: Not on file   Other Topics Concern   • Not on file   Social History Narrative   • Not on file     Social Determinants of Health     Financial Resource Strain: Not on file   Food Insecurity: No Food Insecurity (8/29/2023)    Hunger Vital Sign    • Worried About Running Out of Food in the Last Year: Never true    • Ran Out of Food in the Last Year: Never true   Transportation Needs: No Transportation Needs (8/29/2023)    PRAPARE - Transportation    • Lack of Transportation (Medical): No    • Lack of Transportation (Non-Medical): No   Physical Activity: Not on file   Stress: Not on file   Social Connections: Not on file   Intimate Partner Violence: Not on file   Housing Stability: Low Risk  (8/29/2023)    Housing Stability Vital Sign    • Unable to Pay for Housing in the Last Year: No    • Number of Places Lived in the Last Year: 1    • Unstable Housing in the Last Year: No       Family History:  History reviewed. No pertinent family history. Allergies:  No Known Allergies    Medications Prior to Admission:    No current facility-administered medications on file prior to encounter. Current Outpatient Medications on File Prior to Encounter   Medication Sig Dispense Refill   • insulin aspart (NovoLOG FlexPen) 100 UNIT/ML injection pen USE ICR 1:7 FOR MEALS/SNACK + CORRECTION 1:10 >140 BEFORE MEAL AS DIRECTED (STARTS CORRECTION ) TOTAL DAILY DOSE UP TO 90 UNITS PER DAY     • insulin detemir (Levemir FlexTouch) 100 Units/mL injection pen Inject 70 Units under the skin daily     • levothyroxine 112 mcg tablet Take 125 mcg by mouth in the morning     • lisinopril (ZESTRIL) 20 mg tablet Take 20 mg by mouth in the morning     • losartan (COZAAR) 50 mg tablet Take 1 tablet by mouth daily     • omeprazole (PriLOSEC) 40 MG capsule Take 1 capsule (40 mg total) by mouth 2 (two) times a day for 30 days, THEN 1 capsule (40 mg total) daily.  90 capsule 0   • pravastatin (PRAVACHOL) 20 mg tablet Take 20 mg by mouth daily at bedtime (Patient not taking: Reported on 8/29/2023)     • verapamil (VERELAN) 240 MG 24 hr capsule Take 240 mg by mouth in the morning (Patient not taking: Reported on 8/29/2023)          Current Medications:    Current Facility-Administered Medications:   •  aspirin (ECOTRIN LOW STRENGTH) EC tablet 81 mg, 81 mg, Oral, Daily, Rebeca E Held, PA-C, 81 mg at 08/31/23 8631  •  atorvastatin (LIPITOR) tablet 40 mg, 40 mg, Oral, Daily With Samantha Maurer MD, 40 mg at 08/30/23 1700  •  enoxaparin (LOVENOX) subcutaneous injection 40 mg, 40 mg, Subcutaneous, Daily, Rebeca E Held, PA-C, 40 mg at 08/30/23 1458  •  fluticasone (ARNUITY ELLIPTA) 100 MCG/ACT inhaler 1 puff, 1 puff, Inhalation, Daily, Lisa Garcia PA-C  •  insulin detemir (LEVEMIR) subcutaneous injection 20 Units, 20 Units, Subcutaneous, Q12H [facility], SURAJ Christopher, 20 Units at 08/30/23 2115  •  insulin lispro (HumaLOG) 100 units/mL subcutaneous injection 1-6 Units, 1-6 Units, Subcutaneous, Q6H [facility] **AND** Fingerstick Glucose (POCT), , , Q6H, Sara Otero MD  •  levothyroxine tablet 125 mcg, 125 mcg, Oral, Daily, Rebeca E Held, PA-C, 125 mcg at 08/31/23 0776  •  losartan (COZAAR) tablet 50 mg, 50 mg, Oral, Daily, Rebeca E Held, PA-C, 50 mg at 08/31/23 0804  •  verapamil (CALAN-SR) CR tablet 120 mg, 120 mg, Oral, Daily, Rebeca E Held, PA-C, 120 mg at 08/31/23 0952     Review of Systems: negative except for above HPI    Physical Exam:  Vitals:    08/31/23 0725   BP: 151/73   Pulse: 64   Resp: 22   Temp: 97.6 °F (36.4 °C)   SpO2: 98%      HEENT:  Head: Normocephalic, no lesions, without obvious abnormality. CARDIAC:  regular rate and rhythm, S1, S2 normal, no murmur, click, rub or gallop  LUNGS:  normal air entry, lungs clear to auscultation  ABDOMEN:  soft, non-tender.  Bowel sounds normal. No masses, no organomegaly  EXTREMITIES:  extremities normal, warm and well-perfused; no cyanosis, clubbing, or edema    Labs: reviewed    Imaging: reviewed    ASSESSMENT:  Blanco Le is a 68 y.o. male with history of dysphagia secondary to LA Grade D esophagitis and eosinophilic esophagitis who presented to [address] [place] [address] with CVA and slurred speech on 8/29/2023. Patient is a poor historian so majority of history taken from chart. Last EGD February 2023 with LA Grade D esophagitis and food impaction that was resolved with EGD. RECOMMENDATIONS:  1. Recommend esophagram to visualize esophagus. Would not recommend emergent EGD given patient's ability to eat, recent stroke, and untreated eosinophilic esophagitis. 2.  Would start treatment for eosinophilic esophagitis - continue PPI BID and add fluticasone 2 puffs swallowed BID. 3.  Once cardiology testing (echo) and vascular evaluation (for aortic arch ulceration) has been completed then may consider EGD as outpatient to monitor response to medications, and to potentially perform intervention at that time. 4.  Proceed with soft diet for now. Thank you for this consultation. We will continue to follow along with you during the patient's hospital course.

## 2023-08-31 NOTE — PLAN OF CARE
Problem: PHYSICAL THERAPY ADULT  Goal: Performs mobility at highest level of function for planned discharge setting. See evaluation for individualized goals. Description: Treatment/Interventions: ADL retraining, Functional transfer training, LE strengthening/ROM, Elevations, Therapeutic exercise, Endurance training, Equipment eval/education, Patient/family training, Bed mobility, Gait training, Compensatory technique education, Spoke to nursing, Spoke to case management, OT  Equipment Recommended:  (TBD by rehab)       See flowsheet documentation for full assessment, interventions and recommendations. [id number] by Rubi Lundberg PT  Outcome: Progressing  Note: Prognosis: Good  Problem List: Decreased strength, Decreased endurance, Impaired balance, Decreased mobility, Decreased coordination, Decreased safety awareness, Obesity  Assessment: Pt tolerated session fairly well. He was able to ambulate increased distance and tolerated increased activity with sitting rest between all activities. He requires min cues for proper completion of LE TE and min cues for hand placement for safety with use of RW for transfers and min to mod verbal cues for improved gait pattern. He was able to demonstrate goo carryover with improved RW use staying within NAOMY of RW as session and trials progressed. He is limited by decreased strength, balance, endurance. He will continue to benefit from PT services to maximize LOF. Barriers to Discharge: Decreased caregiver support, Inaccessible home environment  Barriers to Discharge Comments: requires assistance with mobility, lives alone, increased fall risk  PT Discharge Recommendation: Post acute rehabilitation services (acute inpatient rehab)    See flowsheet documentation for full assessment.

## 2023-08-31 NOTE — CASE MANAGEMENT
Case Management Progress Note    Patient name Elvira Hampton  Location /-14 MRN [id number]  : 1945 Date 2023       LOS (days): 2  Geometric Mean LOS (GMLOS) (days): 2.90  Days to GMLOS:0.7        OBJECTIVE:        Current admission status: Inpatient  Preferred Pharmacy:   [address] [place]  [address] [address]  Phone: [phone] Fax: [phone]    Primary Care Provider: Mahesh Cain MD    Primary Insurance: Jeananne Merlin REP  Secondary Insurance:     PROGRESS NOTE:reviewed list with pt and spoke with son via phone. Pt preference is [facility]. Message sent to them and they do not want me t submit auth yet as they are waiting to see if bed available tomorrow and if a MD is covering.

## 2023-08-31 NOTE — CASE MANAGEMENT
Case Management Progress Note    Patient name Rosanna Solorio  Location /-67 MRN [id number]  : 1945 Date 2023       LOS (days): 2  Geometric Mean LOS (GMLOS) (days): 2.90  Days to GMLOS:0.7        OBJECTIVE:        Current admission status: Inpatient  Preferred Pharmacy:   [address] [place] [address]  Phone: [phone] Fax: [phone]    Primary Care Provider: Theresa Lopez MD    Primary Insurance: [facility] REP  Secondary Insurance:     PROGRESS NOTE-via aidin they will have bed at [address] rehab. They must first reach out to family to go over benefits then let me know when I can submit for auth.

## 2023-08-31 NOTE — ASSESSMENT & PLAN NOTE
· Hx of esophageal stricture requiring dilation  · Speech consulted as patient having difficulty swallowing  · GI consulted and recommended starting fluticasone and protonix BID, also doing esophagram. Also recommending outpatient follow up and possible EGD in a few weeks after monitoring on eosinophilic esophagitis medications  · Esophagram pending

## 2023-08-31 NOTE — CASE MANAGEMENT
[facility] received request for authorization from Care Manager.   Authorization request for: Acute Rehab  Facility Name: [facility] NPI: [id number]   Facility MD: Dr. Nirmal Culver NPI: [id number]   Authorization initiated by contacting insurance: Kain Smith Via: [employer]   Pending Reference #: [id number]   Clinicals submitted via: Sussy Chapin

## 2023-09-01 LAB
GLUCOSE SERPL-MCNC: 158 MG/DL (ref 65–140)
GLUCOSE SERPL-MCNC: 174 MG/DL (ref 65–140)
GLUCOSE SERPL-MCNC: 243 MG/DL (ref 65–140)
GLUCOSE SERPL-MCNC: 294 MG/DL (ref 65–140)

## 2023-09-01 PROCEDURE — 92526 ORAL FUNCTION THERAPY: CPT

## 2023-09-01 PROCEDURE — 82948 REAGENT STRIP/BLOOD GLUCOSE: CPT

## 2023-09-01 PROCEDURE — 99232 SBSQ HOSP IP/OBS MODERATE 35: CPT | Performed by: FAMILY MEDICINE

## 2023-09-01 RX ORDER — INSULIN LISPRO 100 [IU]/ML
2-12 INJECTION, SOLUTION INTRAVENOUS; SUBCUTANEOUS
Status: DISCONTINUED | OUTPATIENT
Start: 2023-09-01 | End: 2023-09-05 | Stop reason: HOSPADM

## 2023-09-01 RX ORDER — INSULIN LISPRO 100 [IU]/ML
1-6 INJECTION, SOLUTION INTRAVENOUS; SUBCUTANEOUS
Status: DISCONTINUED | OUTPATIENT
Start: 2023-09-01 | End: 2023-09-05 | Stop reason: HOSPADM

## 2023-09-01 RX ORDER — INSULIN LISPRO 100 [IU]/ML
5 INJECTION, SOLUTION INTRAVENOUS; SUBCUTANEOUS
Status: DISCONTINUED | OUTPATIENT
Start: 2023-09-01 | End: 2023-09-05 | Stop reason: HOSPADM

## 2023-09-01 RX ADMIN — LOSARTAN POTASSIUM 50 MG: 50 TABLET, FILM COATED ORAL at 08:06

## 2023-09-01 RX ADMIN — LEVOTHYROXINE SODIUM 125 MCG: 125 TABLET ORAL at 08:06

## 2023-09-01 RX ADMIN — INSULIN DETEMIR 20 UNITS: 100 INJECTION, SOLUTION SUBCUTANEOUS at 08:06

## 2023-09-01 RX ADMIN — INSULIN DETEMIR 20 UNITS: 100 INJECTION, SOLUTION SUBCUTANEOUS at 21:33

## 2023-09-01 RX ADMIN — VERAPAMIL HYDROCHLORIDE 120 MG: 120 TABLET, FILM COATED, EXTENDED RELEASE ORAL at 08:06

## 2023-09-01 RX ADMIN — ATORVASTATIN CALCIUM 40 MG: 40 TABLET, FILM COATED ORAL at 16:50

## 2023-09-01 RX ADMIN — PANTOPRAZOLE SODIUM 40 MG: 40 TABLET, DELAYED RELEASE ORAL at 16:50

## 2023-09-01 RX ADMIN — INSULIN LISPRO 4 UNITS: 100 INJECTION, SOLUTION INTRAVENOUS; SUBCUTANEOUS at 16:46

## 2023-09-01 RX ADMIN — ENOXAPARIN SODIUM 40 MG: 40 INJECTION SUBCUTANEOUS at 12:08

## 2023-09-01 RX ADMIN — INSULIN LISPRO 4 UNITS: 100 INJECTION, SOLUTION INTRAVENOUS; SUBCUTANEOUS at 12:08

## 2023-09-01 RX ADMIN — INSULIN LISPRO 5 UNITS: 100 INJECTION, SOLUTION INTRAVENOUS; SUBCUTANEOUS at 14:09

## 2023-09-01 RX ADMIN — FLUTICASONE PROPIONATE 2 PUFF: 220 AEROSOL, METERED RESPIRATORY (INHALATION) at 21:32

## 2023-09-01 RX ADMIN — FLUTICASONE PROPIONATE 2 PUFF: 220 AEROSOL, METERED RESPIRATORY (INHALATION) at 08:06

## 2023-09-01 RX ADMIN — INSULIN LISPRO 5 UNITS: 100 INJECTION, SOLUTION INTRAVENOUS; SUBCUTANEOUS at 16:47

## 2023-09-01 RX ADMIN — ASPIRIN 81 MG: 81 TABLET, COATED ORAL at 08:06

## 2023-09-01 RX ADMIN — INSULIN LISPRO 1 UNITS: 100 INJECTION, SOLUTION INTRAVENOUS; SUBCUTANEOUS at 08:06

## 2023-09-01 RX ADMIN — INSULIN LISPRO 1 UNITS: 100 INJECTION, SOLUTION INTRAVENOUS; SUBCUTANEOUS at 21:31

## 2023-09-01 RX ADMIN — PANTOPRAZOLE SODIUM 40 MG: 40 TABLET, DELAYED RELEASE ORAL at 08:07

## 2023-09-01 NOTE — ASSESSMENT & PLAN NOTE
Lab Results   Component Value Date    HGBA1C 8.4 (H) 08/29/2023       Recent Labs     08/31/23  1526 08/31/23  2046 09/01/23  0714 09/01/23  1055   POCGLU 296* 201* 158* 294*       Blood Sugar Average: Last 72 hrs:  (P) [id number]     · Poorly controlled evidenced by most recent A1c, follow-up repeat   · Home insulin regimen: Levimir 72 units twice daily + SSI with meals   · Start decreased dose Levimir 45 units twice daily + SSI   · Monitor accuchecks and adjust as needed   · Blood sugars running high, insulin doses adjusted.

## 2023-09-01 NOTE — ASSESSMENT & PLAN NOTE
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   · Continue with ASA and statin

## 2023-09-01 NOTE — CASE MANAGEMENT
Case Management Discharge Planning Note    Patient name Ayan Plummer  Location /-91 MRN [id number]  : 1945 Date 2023       Current Admission Date: 2023  Current Admission Diagnosis:Acute CVA (cerebrovascular accident) [facility])   Patient Active Problem List    Diagnosis Date Noted   • Eosinophilic esophagitis    • Acute CVA (cerebrovascular accident) ([address]) 2023   • Hypothyroidism 2023   • CKD (chronic kidney disease) 2023   • Thyroid nodule 2023   • Penetrating atherosclerotic ulcer of aorta ([address]) 2023   • Hyperlipidemia 2023   • Foreign body sensation in throat 2023   • Type 2 diabetes mellitus with hyperglycemia, with long-term current use of insulin ([address])    • Hypertension       LOS (days): 3  Geometric Mean LOS (GMLOS) (days): 2.90  Days to GMLOS:-0.3     OBJECTIVE:  Risk of Unplanned Readmission Score: 10.79         Current admission status: Inpatient   Preferred Pharmacy:   [address] [place] [address]  Phone: [phone] Fax: [phone]    Primary Care Provider: Marcelino Singh MD    Primary Insurance: [facility]  Secondary Insurance:     DISCHARGE DETAILS:     Patient is medically stable for dc awaiting authorization. Spoke to [facility] Admission Liaison, patient can admit there this weekend, after receiving the authorization.

## 2023-09-01 NOTE — ASSESSMENT & PLAN NOTE
Presented with 5-7 days history of  slurred speech. Admitted to stroke pathway. 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 · CTA head/neck: Negative for large vessel occlusion, dissection, or aneurysm. 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 · Status post 325 mg ASA on arrival, continue with 81 mg daily and home dose satin   · MRI brain: Small acute juxtacortical white matter infarct within the right posterior frontal lobe, without mass effect or hemorrhagic transformation. Advanced chronic microangiopathic change elsewhere within the brain parenchyma. Chronic microhemorrhages within the left temporal occipital junction and left posterior medial cerebellum. No acute hemorrhage. · Echocardiogram normal  · , TSH normal  · A1c 8.4. improved from 9.4  · Neurology consulted and recommending repeat echo as well as loop recorder-repeat limited echo is remain very difficult to study cardiac chambers. Will also need follow up in 6 weeks with neurovascular  · PT/OT recommending acute rehab-pending authorization. Patient is still having some balance issues.   · Speech recommending GI consult for possible esophageal dilation

## 2023-09-01 NOTE — ASSESSMENT & PLAN NOTE
· Hx of esophageal stricture requiring dilation  · Speech consulted as patient having difficulty swallowing  · GI consulted and recommended starting fluticasone and protonix BID, also doing esophagram. Also recommending outpatient follow up and possible EGD in a few weeks after monitoring on eosinophilic esophagitis medications  · Esophagogram normal

## 2023-09-01 NOTE — PLAN OF CARE
Problem: MOBILITY - ADULT  Goal: Maintain or return to baseline ADL function  Description: INTERVENTIONS:  -  Assess patient's ability to carry out ADLs; assess patient's baseline for ADL function and identify physical deficits which impact ability to perform ADLs (bathing, care of mouth/teeth, toileting, grooming, dressing, etc.)  - Assess/evaluate cause of self-care deficits   - Assess range of motion  - Assess patient's mobility; develop plan if impaired  - Assess patient's need for assistive devices and provide as appropriate  - Encourage maximum independence but intervene and supervise when necessary  - Involve family in performance of ADLs  - Assess for home care needs following discharge   - Consider OT consult to assist with ADL evaluation and planning for discharge  - Provide patient education as appropriate  Outcome: Progressing  Goal: Maintains/Returns to pre admission functional level  Description: INTERVENTIONS:  - Perform BMAT or MOVE assessment daily.   - Set and communicate daily mobility goal to care team and patient/family/caregiver. - Collaborate with rehabilitation services on mobility goals if consulted  - Perform Range of Motion 2 times a day. - Reposition patient every 2 hours.   - Dangle patient 2 times a day  - Stand patient 2 times a day  - Ambulate patient 2 times a day  - Out of bed to chair 2 times a day   - Out of bed for meals 2 times a day  - Out of bed for toileting  - Record patient progress and toleration of activity level   Outcome: Progressing     Problem: PAIN - ADULT  Goal: Verbalizes/displays adequate comfort level or baseline comfort level  Description: Interventions:  - Encourage patient to monitor pain and request assistance  - Assess pain using appropriate pain scale  - Administer analgesics based on type and severity of pain and evaluate response  - Implement non-pharmacological measures as appropriate and evaluate response  - Consider cultural and social influences on pain and pain management  - Notify physician/advanced practitioner if interventions unsuccessful or patient reports new pain  Outcome: Progressing     Problem: INFECTION - ADULT  Goal: Absence or prevention of progression during hospitalization  Description: INTERVENTIONS:  - Assess and monitor for signs and symptoms of infection  - Monitor lab/diagnostic results  - Monitor all insertion sites, i.e. indwelling lines, tubes, and drains  - Monitor endotracheal if appropriate and nasal secretions for changes in amount and color  - [place] appropriate cooling/warming therapies per order  - Administer medications as ordered  - Instruct and encourage patient and family to use good hand hygiene technique  - Identify and instruct in appropriate isolation precautions for identified infection/condition  Outcome: Progressing  Goal: Absence of fever/infection during neutropenic period  Description: INTERVENTIONS:  - Monitor WBC    Outcome: Progressing

## 2023-09-01 NOTE — SPEECH THERAPY NOTE
Speech Language/Pathology    Speech/Language Pathology Progress Note    Patient Name: Eben Julian  UACED'D Date: 9/1/2023    Assessment:  Pt seen for dysphagia therapy. Pt currently on level 2 mechanical soft/ thin liquids following difficulty w/ bread. Pt w/ known esophagitis, esophagram completed and was unremarkable. GI recommending treatment for treatment for esophagitis w/ possible EGD in the future. Pt reported soft food is going well and he is not having difficulty. SLP provided verbal and written education on mechanical soft diet for return home. Pt expressed understanding especially with making meats and vegetables softer and avoiding breads.     Plan/Recommendations:  Recommend to continue level 2 mechanical soft/ thin liquids  Meds whole as tolerated    Dinorah Adhikari, [id number] [facility]  9/1/2023

## 2023-09-01 NOTE — PROGRESS NOTES
[address] [facility]  Progress Note  Name: Phoebe Tong  MRN: [id number]  Unit/Bed#: -01 I Date of Admission: 8/29/2023   Date of Service: 9/1/2023 I Hospital Day: 3    Assessment/Plan   * Acute CVA (cerebrovascular accident) [facility])  Assessment & Plan  Presented with 5-7 days history of  slurred speech. Admitted to stroke pathway. 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 · CTA head/neck: Negative for large vessel occlusion, dissection, or aneurysm. 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 · Status post 325 mg ASA on arrival, continue with 81 mg daily and home dose satin   · MRI brain: Small acute juxtacortical white matter infarct within the right posterior frontal lobe, without mass effect or hemorrhagic transformation. Advanced chronic microangiopathic change elsewhere within the brain parenchyma. Chronic microhemorrhages within the left temporal occipital junction and left posterior medial cerebellum. No acute hemorrhage. · Echocardiogram normal  · , TSH normal  · A1c 8.4. improved from 9.4  · Neurology consulted and recommending repeat echo as well as loop recorder-repeat limited echo is remain very difficult to study cardiac chambers. Will also need follow up in 6 weeks with neurovascular  · PT/OT recommending acute rehab-pending authorization. Patient is still having some balance issues.   · Speech recommending GI consult for possible esophageal dilation    Eosinophilic esophagitis  Assessment & Plan  · Hx of esophageal stricture requiring dilation  · Speech consulted as patient having difficulty swallowing  · GI consulted and recommended starting fluticasone and protonix BID, also doing esophagram. Also recommending outpatient follow up and possible EGD in a few weeks after monitoring on eosinophilic esophagitis medications  · Esophagogram normal    Hyperlipidemia  Assessment & Plan  Lipid panel: cholesterol 159, ,    · Continued on home dose pravastatin     Penetrating atherosclerotic ulcer of aorta (HCC)  Assessment & Plan  Incidental finding on CTA head/neck: 2.2 cm penetrating atheromatous ulcer along lateral wall of aortic arch projecting inferolaterally   · Findings discussed with patient and family at time of admission   · Patient will need to follow-up cardiothoracic surgery outpatient basis.   · Continue with ASA and statin     Thyroid nodule  Assessment & Plan  Incidental finding on CTA head/neck: 1.7 cm peripherally calcified nodule in right thyroid lobe  · Findings discussed with patient and family on admission   · Outpatient follow-up for US     CKD (chronic kidney disease)  Assessment & Plan  Lab Results   Component Value Date    EGFR 63 08/30/2023    EGFR 52 08/29/2023    EGFR 65 02/15/2023    CREATININE 1.12 08/30/2023    CREATININE 1.31 (H) 08/29/2023    CREATININE 1.08 02/15/2023     · No documented history of CKD but baseline creatinine appears to be 1.1-1.3 per chart review   · UA reviewed and unremarkable   · Provide gentle IV fluids overnight and monitor for improvement   · Avoid nephrotoxins and hypotension   · Continue home ACE/ARB for now  · Trend BMP    Hypothyroidism  Assessment & Plan  · TSH elevated 4.97, free T4   · Continue home dose levothyroxine for now     Hypertension  Assessment & Plan  · Hypertensive urgency on arrival, now improved   · Continue home dose lisinopril, losartan and hospital formulary verapamil   · Monitor routinely     Type 2 diabetes mellitus with hyperglycemia, with long-term current use of insulin [facility])  Assessment & Plan  Lab Results   Component Value Date    HGBA1C 8.4 (H) 08/29/2023       Recent Labs     08/31/23  1526 08/31/23  2046 09/01/23  0714 09/01/23  1055   POCGLU 296* 201* 158* 294*       Blood Sugar Average: Last 72 hrs:  (P) [id number]     · Poorly controlled evidenced by most recent A1c, follow-up repeat   · Home insulin regimen: Levimir 72 units twice daily + SSI with meals   · Start decreased dose Levimir 45 units twice daily + SSI   · Monitor accuchecks and adjust as needed   · Blood sugars running high, insulin doses adjusted. VTE Pharmacologic Prophylaxis: VTE Score: 9 High Risk (Score >/= 5) - Pharmacological DVT Prophylaxis Ordered: enoxaparin (Lovenox). Sequential Compression Devices Ordered. Patient Centered Rounds: I performed bedside rounds with nursing staff today. Discussions with Specialists or Other Care Team Provider: Neurology, GI    Education and Discussions with Family / Patient: With patient. Total Time Spent on Date of Encounter in care of patient: 10 minutes This time was spent on one or more of the following: performing physical exam; counseling and coordination of care; obtaining or reviewing history; documenting in the medical record; reviewing/ordering tests, medications or procedures; communicating with other healthcare professionals and discussing with patient's family/caregivers. Current Length of Stay: 3 day(s)  Current Patient Status: Inpatient   Certification Statement: The patient will continue to require additional inpatient hospital stay due to To monitor above conditions  Discharge Plan: Anticipate discharge in 24-48 hrs to rehab facility. Code Status: Level 3 - DNAR and DNI    Subjective:   Seen and evaluated and examined. Sitting upright position. Reports he feels much better. Objective:     Vitals:   Temp (24hrs), Av.8 °F (36.6 °C), Min:97.2 °F (36.2 °C), Max:98.2 °F (36.8 °C)    Temp:  [97.2 °F (36.2 °C)-98.2 °F (36.8 °C)] 97.2 °F (36.2 °C)  HR:  [54-85] 85  Resp:  [16-20] 16  BP: (100-143)/() 143/100  SpO2:  [93 %-97 %] 93 %  Body mass index is 33.19 kg/m². Input and Output Summary (last 24 hours):      Intake/Output Summary (Last 24 hours) at 2023 1345  Last data filed at 2023 1001  Gross per 24 hour   Intake 480 ml Output 1125 ml   Net -645 ml       Physical Exam:   Physical Exam  Vitals and nursing note reviewed. Constitutional:       Appearance: Normal appearance. He is obese. He is not ill-appearing or diaphoretic. HENT:      Mouth/Throat:      Mouth: Mucous membranes are moist.      Pharynx: Oropharynx is clear. No oropharyngeal exudate. Eyes:      Extraocular Movements: Extraocular movements intact. Conjunctiva/sclera: Conjunctivae normal.      Pupils: Pupils are equal, round, and reactive to light. Cardiovascular:      Rate and Rhythm: Normal rate. Heart sounds: No friction rub. No gallop. Pulmonary:      Effort: Pulmonary effort is normal. No respiratory distress. Breath sounds: No stridor. No wheezing. Abdominal:      General: Abdomen is flat. Bowel sounds are normal. There is no distension. Palpations: There is no mass. Tenderness: There is no abdominal tenderness. Hernia: No hernia is present. Neurological:      Mental Status: He is alert and oriented to person, place, and time. Cranial Nerves: No cranial nerve deficit. Sensory: No sensory deficit. Motor: No weakness.       Coordination: Coordination normal.         Additional Data:     Labs:  Results from last 7 days   Lab Units 08/30/23  0508   WBC Thousand/uL 8.20   HEMOGLOBIN g/dL 15.5   HEMATOCRIT % 45.1   PLATELETS Thousands/uL 183     Results from last 7 days   Lab Units 08/30/23  0508   SODIUM mmol/L 135   POTASSIUM mmol/L 3.7   CHLORIDE mmol/L 104   CO2 mmol/L 24   BUN mg/dL 17   CREATININE mg/dL 1.12   ANION GAP mmol/L 7   CALCIUM mg/dL 8.6   ALBUMIN g/dL 3.5   TOTAL BILIRUBIN mg/dL 1.38*   ALK PHOS U/L 59   ALT U/L 10   AST U/L 9*   GLUCOSE RANDOM mg/dL 121     Results from last 7 days   Lab Units 08/29/23  0803   INR  0.91     Results from last 7 days   Lab Units 09/01/23  1055 09/01/23  0714 08/31/23  2046 08/31/23  1526 08/31/23  1135 08/31/23  0802 08/30/23  2113 08/30/23  1625 08/30/23  1124 08/30/23  0510 08/30/23  0025 08/29/23 2052   POC GLUCOSE mg/dl 294* 158* 201* 296* 198* 189* 246* 193* 167* 117 116 147*     Results from last 7 days   Lab Units 08/29/23  0803   HEMOGLOBIN A1C % 8.4*           Lines/Drains:  Invasive Devices     Peripheral Intravenous Line  Duration           Peripheral IV 08/29/23 Dorsal (posterior); Left;Proximal Forearm 3 days                      Imaging: No pertinent imaging reviewed. Recent Cultures (last 7 days):         Last 24 Hours Medication List:   Current Facility-Administered Medications   Medication Dose Route Frequency Provider Last Rate   • aspirin  81 mg Oral Daily Rebeca Dominique PA-C     • atorvastatin  40 mg Oral Daily With Vitaliy Otero MD     • enoxaparin  40 mg Subcutaneous Daily Rebeca Dominique PA-C     • fluticasone  2 puff Inhalation Q12H [address] [facility] [place], DO     • insulin detemir  20 Units Subcutaneous Q12H [address] [facility] SURAJ García     • insulin lispro  1-6 Units Subcutaneous HS Ryan Ledesma MD     • insulin lispro  2-12 Units Subcutaneous TID AC Sara Otero MD     • insulin lispro  5 Units Subcutaneous TID With Meals Ryan Ledesma MD     • levothyroxine  125 mcg Oral Daily Rebeca Dominique PA-C     • losartan  50 mg Oral Daily Rebeca Dominique PA-C     • pantoprazole  40 mg Oral BID AC Lisa Garcia PA-C     • verapamil  120 mg Oral Daily Rebeca Dominique PA-C          Today, Patient Was Seen By: Ryan Ledesma MD    **Please Note: This note may have been constructed using a voice recognition system. **

## 2023-09-02 LAB
GLUCOSE SERPL-MCNC: 203 MG/DL (ref 65–140)
GLUCOSE SERPL-MCNC: 229 MG/DL (ref 65–140)
GLUCOSE SERPL-MCNC: 254 MG/DL (ref 65–140)
GLUCOSE SERPL-MCNC: 312 MG/DL (ref 65–140)

## 2023-09-02 PROCEDURE — 82948 REAGENT STRIP/BLOOD GLUCOSE: CPT

## 2023-09-02 PROCEDURE — 99232 SBSQ HOSP IP/OBS MODERATE 35: CPT | Performed by: FAMILY MEDICINE

## 2023-09-02 RX ADMIN — VERAPAMIL HYDROCHLORIDE 120 MG: 120 TABLET, FILM COATED, EXTENDED RELEASE ORAL at 08:01

## 2023-09-02 RX ADMIN — PANTOPRAZOLE SODIUM 40 MG: 40 TABLET, DELAYED RELEASE ORAL at 16:48

## 2023-09-02 RX ADMIN — ATORVASTATIN CALCIUM 40 MG: 40 TABLET, FILM COATED ORAL at 16:48

## 2023-09-02 RX ADMIN — INSULIN LISPRO 5 UNITS: 100 INJECTION, SOLUTION INTRAVENOUS; SUBCUTANEOUS at 11:54

## 2023-09-02 RX ADMIN — INSULIN DETEMIR 20 UNITS: 100 INJECTION, SOLUTION SUBCUTANEOUS at 08:01

## 2023-09-02 RX ADMIN — FLUTICASONE PROPIONATE 2 PUFF: 220 AEROSOL, METERED RESPIRATORY (INHALATION) at 21:32

## 2023-09-02 RX ADMIN — ASPIRIN 81 MG: 81 TABLET, COATED ORAL at 08:02

## 2023-09-02 RX ADMIN — ENOXAPARIN SODIUM 40 MG: 40 INJECTION SUBCUTANEOUS at 14:00

## 2023-09-02 RX ADMIN — INSULIN LISPRO 3 UNITS: 100 INJECTION, SOLUTION INTRAVENOUS; SUBCUTANEOUS at 21:32

## 2023-09-02 RX ADMIN — LEVOTHYROXINE SODIUM 125 MCG: 125 TABLET ORAL at 08:02

## 2023-09-02 RX ADMIN — FLUTICASONE PROPIONATE 2 PUFF: 220 AEROSOL, METERED RESPIRATORY (INHALATION) at 09:59

## 2023-09-02 RX ADMIN — INSULIN LISPRO 8 UNITS: 100 INJECTION, SOLUTION INTRAVENOUS; SUBCUTANEOUS at 11:53

## 2023-09-02 RX ADMIN — INSULIN LISPRO 4 UNITS: 100 INJECTION, SOLUTION INTRAVENOUS; SUBCUTANEOUS at 16:48

## 2023-09-02 RX ADMIN — INSULIN LISPRO 5 UNITS: 100 INJECTION, SOLUTION INTRAVENOUS; SUBCUTANEOUS at 16:49

## 2023-09-02 RX ADMIN — INSULIN LISPRO 5 UNITS: 100 INJECTION, SOLUTION INTRAVENOUS; SUBCUTANEOUS at 08:03

## 2023-09-02 RX ADMIN — INSULIN LISPRO 4 UNITS: 100 INJECTION, SOLUTION INTRAVENOUS; SUBCUTANEOUS at 07:59

## 2023-09-02 RX ADMIN — PANTOPRAZOLE SODIUM 40 MG: 40 TABLET, DELAYED RELEASE ORAL at 06:13

## 2023-09-02 RX ADMIN — LOSARTAN POTASSIUM 50 MG: 50 TABLET, FILM COATED ORAL at 08:02

## 2023-09-02 RX ADMIN — INSULIN DETEMIR 30 UNITS: 100 INJECTION, SOLUTION SUBCUTANEOUS at 21:32

## 2023-09-02 NOTE — PROGRESS NOTES
[address] [facility]  Progress Note  Name: Solange Hernandez  MRN: [id number]  Unit/Bed#: -01 I Date of Admission: 8/29/2023   Date of Service: 9/2/2023 I Hospital Day: 4    Assessment/Plan   * Acute CVA (cerebrovascular accident) [facility])  Assessment & Plan  Presented with 5-7 days history of  slurred speech. Admitted to stroke pathway. 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 · CTA head/neck: Negative for large vessel occlusion, dissection, or aneurysm. 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 · Status post 325 mg ASA on arrival, continue with 81 mg daily and home dose satin   · MRI brain: Small acute juxtacortical white matter infarct within the right posterior frontal lobe, without mass effect or hemorrhagic transformation. Advanced chronic microangiopathic change elsewhere within the brain parenchyma. Chronic microhemorrhages within the left temporal occipital junction and left posterior medial cerebellum. No acute hemorrhage. · Echocardiogram normal  · , TSH normal  · A1c 8.4. improved from 9.4  · Neurology consulted and recommending repeat echo as well as loop recorder-repeat limited echo is remain very difficult to study cardiac chambers. Will also need follow up in 6 weeks with neurovascular  · PT/OT recommending acute rehab-pending authorization. Patient still having some balance issues-remain unsafe for discharge to home. Patient will need rehab.   · Speech recommending GI consult for possible esophageal dilation    Eosinophilic esophagitis  Assessment & Plan  · Hx of esophageal stricture requiring dilation  · Speech consulted as patient having difficulty swallowing  · GI consulted and recommended starting fluticasone and protonix BID, also doing esophagram. Also recommending outpatient follow up and possible EGD in a few weeks after monitoring on eosinophilic esophagitis medications  · Esophagogram normal    Hyperlipidemia  Assessment & Plan  Lipid panel: cholesterol 159, ,    · Continued on home dose pravastatin     Penetrating atherosclerotic ulcer of aorta (HCC)  Assessment & Plan  Incidental finding on CTA head/neck: 2.2 cm penetrating atheromatous ulcer along lateral wall of aortic arch projecting inferolaterally   · Findings discussed with patient and family at time of admission   · Patient will need to follow-up cardiothoracic surgery outpatient basis.   · Continue with ASA and statin     Thyroid nodule  Assessment & Plan  Incidental finding on CTA head/neck: 1.7 cm peripherally calcified nodule in right thyroid lobe  · Findings discussed with patient and family on admission   · Outpatient follow-up for US     CKD (chronic kidney disease)  Assessment & Plan  Lab Results   Component Value Date    EGFR 63 08/30/2023    EGFR 52 08/29/2023    EGFR 65 02/15/2023    CREATININE 1.12 08/30/2023    CREATININE 1.31 (H) 08/29/2023    CREATININE 1.08 02/15/2023     · No documented history of CKD but baseline creatinine appears to be 1.1-1.3 per chart review   · UA reviewed and unremarkable   · Provide gentle IV fluids overnight and monitor for improvement   · Avoid nephrotoxins and hypotension   · Continue home ACE/ARB for now  · Trend BMP    Hypothyroidism  Assessment & Plan  · TSH elevated 4.97, free T4   · Continue home dose levothyroxine for now     Hypertension  Assessment & Plan  · Hypertensive urgency on arrival, now improved   · Continue home dose losartan and hospital formulary verapamil   · Monitor routinely     Type 2 diabetes mellitus with hyperglycemia, with long-term current use of insulin [facility])  Assessment & Plan  Lab Results   Component Value Date    HGBA1C 8.4 (H) 08/29/2023       Recent Labs     09/01/23  1055 09/01/23  1645 09/01/23  2120 09/02/23  0724   POCGLU 294* 243* 174* 203*       Blood Sugar Average: Last 72 hrs:  (P) [id number] · Poorly controlled evidenced by most recent A1c, follow-up repeat   · Home insulin regimen: Levimir 72 units twice daily + SSI with meals   · Patient blood sugar remain high, resume home dose of Levemir. Sliding scale with hypoglycemia precaution             VTE Pharmacologic Prophylaxis: VTE Score: 9 High Risk (Score >/= 5) - Pharmacological DVT Prophylaxis Ordered: enoxaparin (Lovenox). Sequential Compression Devices Ordered. Patient Centered Rounds: I performed bedside rounds with nursing staff today. Discussions with Specialists or Other Care Team Provider: none    Education and Discussions with Family / Patient: with patient. Total Time Spent on Date of Encounter in care of patient: 5 minutes This time was spent on one or more of the following: performing physical exam; counseling and coordination of care; obtaining or reviewing history; documenting in the medical record; reviewing/ordering tests, medications or procedures; communicating with other healthcare professionals and discussing with patient's family/caregivers. Current Length of Stay: 4 day(s)  Current Patient Status: Inpatient   Certification Statement: The patient will continue to require additional inpatient hospital stay due to To monitor above condition, pending authorization for placement  Discharge Plan: Anticipate discharge in >72 hrs to rehab facility. Code Status: Level 3 - DNAR and DNI    Subjective:   Seen and evaluated and examined. Resting comfortably. Sitting upright position. Denies any significant complaint. Objective:     Vitals:   Temp (24hrs), Av.6 °F (36.4 °C), Min:97.2 °F (36.2 °C), Max:98.1 °F (36.7 °C)    Temp:  [97.2 °F (36.2 °C)-98.1 °F (36.7 °C)] 97.4 °F (36.3 °C)  HR:  [59-85] 59  Resp:  [16-19] 16  BP: (111-153)/() 153/101  SpO2:  [94 %-97 %] 94 %  Body mass index is 33.19 kg/m². Input and Output Summary (last 24 hours):      Intake/Output Summary (Last 24 hours) at 2023 4949  Last data filed at 9/2/2023 0839  Gross per 24 hour   Intake 660 ml   Output 1155 ml   Net -495 ml       Physical Exam:   Physical Exam  Vitals and nursing note reviewed. Constitutional:       Appearance: He is obese. He is not ill-appearing or diaphoretic. HENT:      Nose: Nose normal.      Mouth/Throat:      Mouth: Mucous membranes are moist.      Pharynx: Oropharynx is clear. No oropharyngeal exudate. Comments: Poor dentition  Eyes:      General: No scleral icterus. Left eye: No discharge. Extraocular Movements: Extraocular movements intact. Conjunctiva/sclera: Conjunctivae normal.      Pupils: Pupils are equal, round, and reactive to light. Cardiovascular:      Rate and Rhythm: Normal rate. Heart sounds: Normal heart sounds. No murmur heard. No friction rub. No gallop. Pulmonary:      Effort: Pulmonary effort is normal. No respiratory distress. Breath sounds: No stridor. No wheezing or rhonchi. Abdominal:      General: Abdomen is flat. Bowel sounds are normal. There is no distension. Palpations: There is no mass. Tenderness: There is no abdominal tenderness. Hernia: No hernia is present. Musculoskeletal:      Right lower leg: No edema. Left lower leg: No edema. Neurological:      Mental Status: He is alert and oriented to person, place, and time. Cranial Nerves: No cranial nerve deficit. Sensory: No sensory deficit. Motor: No weakness.       Coordination: Coordination normal.         Additional Data:     Labs:  Results from last 7 days   Lab Units 08/30/23  0508   WBC Thousand/uL 8.20   HEMOGLOBIN g/dL 15.5   HEMATOCRIT % 45.1   PLATELETS Thousands/uL 183     Results from last 7 days   Lab Units 08/30/23  0508   SODIUM mmol/L 135   POTASSIUM mmol/L 3.7   CHLORIDE mmol/L 104   CO2 mmol/L 24   BUN mg/dL 17   CREATININE mg/dL 1.12   ANION GAP mmol/L 7   CALCIUM mg/dL 8.6   ALBUMIN g/dL 3.5   TOTAL BILIRUBIN mg/dL 1.38*   ALK PHOS U/L 59   ALT U/L 10   AST U/L 9*   GLUCOSE RANDOM mg/dL 121     Results from last 7 days   Lab Units 08/29/23  0803   INR  0.91     Results from last 7 days   Lab Units 09/02/23  0724 09/01/23  2120 09/01/23  1645 09/01/23  1055 09/01/23  0714 08/31/23  2046 08/31/23  1526 08/31/23  1135 08/31/23  0802 08/30/23  2113 08/30/23  1625 08/30/23  1124   POC GLUCOSE mg/dl 203* 174* 243* 294* 158* 201* 296* 198* 189* 246* 193* 167*     Results from last 7 days   Lab Units 08/29/23  0803   HEMOGLOBIN A1C % 8.4*           Lines/Drains:  Invasive Devices     Peripheral Intravenous Line  Duration           Peripheral IV 08/29/23 Dorsal (posterior); Left;Proximal Forearm 4 days                      Imaging: No pertinent imaging reviewed. Recent Cultures (last 7 days):         Last 24 Hours Medication List:   Current Facility-Administered Medications   Medication Dose Route Frequency Provider Last Rate   • aspirin  81 mg Oral Daily Rebeca Dominique PA-C     • atorvastatin  40 mg Oral Daily With Vitaliy Otero MD     • enoxaparin  40 mg Subcutaneous Daily Rebeca Dominique PA-C     • fluticasone  2 puff Inhalation Q12H [address] [facility] [place], DO     • insulin detemir  20 Units Subcutaneous Q12H [address] [facility] Ricky, CRNP     • insulin lispro  1-6 Units Subcutaneous HS Javier Haro MD     • insulin lispro  2-12 Units Subcutaneous TID AC Sara Otero MD     • insulin lispro  5 Units Subcutaneous TID With Meals Javier Haro MD     • levothyroxine  125 mcg Oral Daily Rebeca Dominique PA-C     • losartan  50 mg Oral Daily Rebeca Dominique PA-C     • pantoprazole  40 mg Oral BID AC Lisa Garcia PA-C     • verapamil  120 mg Oral Daily Rebeca Dominique PA-C          Today, Patient Was Seen By: Javier Haro MD    **Please Note: This note may have been constructed using a voice recognition system. **

## 2023-09-02 NOTE — ASSESSMENT & PLAN NOTE
Presented with 5-7 days history of  slurred speech. Admitted to stroke pathway. 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 · CTA head/neck: Negative for large vessel occlusion, dissection, or aneurysm. 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 · Status post 325 mg ASA on arrival, continue with 81 mg daily and home dose satin   · MRI brain: Small acute juxtacortical white matter infarct within the right posterior frontal lobe, without mass effect or hemorrhagic transformation. Advanced chronic microangiopathic change elsewhere within the brain parenchyma. Chronic microhemorrhages within the left temporal occipital junction and left posterior medial cerebellum. No acute hemorrhage. · Echocardiogram normal  · , TSH normal  · A1c 8.4. improved from 9.4  · Neurology consulted and recommending repeat echo as well as loop recorder-repeat limited echo is remain very difficult to study cardiac chambers. Will also need follow up in 6 weeks with neurovascular  · PT/OT recommending acute rehab-pending authorization. Patient still having some balance issues-remain unsafe for discharge to home. Patient will need rehab.   · Speech recommending GI consult for possible esophageal dilation

## 2023-09-02 NOTE — ASSESSMENT & PLAN NOTE
· Hypertensive urgency on arrival, now improved   · Continue home dose losartan and hospital formulary verapamil   · Monitor routinely

## 2023-09-02 NOTE — PLAN OF CARE
Problem: MOBILITY - ADULT  Goal: Maintain or return to baseline ADL function  Description: INTERVENTIONS:  -  Assess patient's ability to carry out ADLs; assess patient's baseline for ADL function and identify physical deficits which impact ability to perform ADLs (bathing, care of mouth/teeth, toileting, grooming, dressing, etc.)  - Assess/evaluate cause of self-care deficits   - Assess range of motion  - Assess patient's mobility; develop plan if impaired  - Assess patient's need for assistive devices and provide as appropriate  - Encourage maximum independence but intervene and supervise when necessary  - Involve family in performance of ADLs  - Assess for home care needs following discharge   - Consider OT consult to assist with ADL evaluation and planning for discharge  - Provide patient education as appropriate  Outcome: Progressing  Goal: Maintains/Returns to pre admission functional level  Description: INTERVENTIONS:  - Perform BMAT or MOVE assessment daily.   - Set and communicate daily mobility goal to care team and patient/family/caregiver. - Collaborate with rehabilitation services on mobility goals if consulted  - Perform Range of Motion 2 times a day. - Reposition patient every 2 hours.   - Dangle patient 2 times a day  - Stand patient 2 times a day  - Ambulate patient 2 times a day  - Out of bed to chair 2 times a day   - Out of bed for meals 2 times a day  - Out of bed for toileting  - Record patient progress and toleration of activity level   Outcome: Progressing     Problem: PAIN - ADULT  Goal: Verbalizes/displays adequate comfort level or baseline comfort level  Description: Interventions:  - Encourage patient to monitor pain and request assistance  - Assess pain using appropriate pain scale  - Administer analgesics based on type and severity of pain and evaluate response  - Implement non-pharmacological measures as appropriate and evaluate response  - Consider cultural and social influences on pain and pain management  - Notify physician/advanced practitioner if interventions unsuccessful or patient reports new pain  Outcome: Progressing     Problem: INFECTION - ADULT  Goal: Absence or prevention of progression during hospitalization  Description: INTERVENTIONS:  - Assess and monitor for signs and symptoms of infection  - Monitor lab/diagnostic results  - Monitor all insertion sites, i.e. indwelling lines, tubes, and drains  - Monitor endotracheal if appropriate and nasal secretions for changes in amount and color  - [place] appropriate cooling/warming therapies per order  - Administer medications as ordered  - Instruct and encourage patient and family to use good hand hygiene technique  - Identify and instruct in appropriate isolation precautions for identified infection/condition  Outcome: Progressing  Goal: Absence of fever/infection during neutropenic period  Description: INTERVENTIONS:  - Monitor WBC    Outcome: Progressing     Problem: SAFETY ADULT  Goal: Maintain or return to baseline ADL function  Description: INTERVENTIONS:  -  Assess patient's ability to carry out ADLs; assess patient's baseline for ADL function and identify physical deficits which impact ability to perform ADLs (bathing, care of mouth/teeth, toileting, grooming, dressing, etc.)  - Assess/evaluate cause of self-care deficits   - Assess range of motion  - Assess patient's mobility; develop plan if impaired  - Assess patient's need for assistive devices and provide as appropriate  - Encourage maximum independence but intervene and supervise when necessary  - Involve family in performance of ADLs  - Assess for home care needs following discharge   - Consider OT consult to assist with ADL evaluation and planning for discharge  - Provide patient education as appropriate  Outcome: Progressing  Goal: Maintains/Returns to pre admission functional level  Description: INTERVENTIONS:  - Perform BMAT or MOVE assessment daily.   - Set and communicate daily mobility goal to care team and patient/family/caregiver. - Collaborate with rehabilitation services on mobility goals if consulted  - Perform Range of Motion 2 times a day. - Reposition patient every 2 hours.   - Dangle patient 2 times a day  - Stand patient 2 times a day  - Ambulate patient 2 times a day  - Out of bed to chair 2 times a day   - Out of bed for meals 2 times a day  - Out of bed for toileting  - Record patient progress and toleration of activity level   Outcome: Progressing  Goal: Patient will remain free of falls  Description: INTERVENTIONS:  - Educate patient/family on patient safety including physical limitations  - Instruct patient to call for assistance with activity   - Consult OT/PT to assist with strengthening/mobility   - Keep Call bell within reach  - Keep bed low and locked with side rails adjusted as appropriate  - Keep care items and personal belongings within reach  - Initiate and maintain comfort rounds  - Make Fall Risk Sign visible to staff  - Offer Toileting every 2 Hours, in advance of need  - Initiate/Maintain bed/chair alarm  - Obtain necessary fall risk management equipment: na  - Apply yellow socks and bracelet for high fall risk patients  - Consider moving patient to room near nurses station  Outcome: Progressing     Problem: DISCHARGE PLANNING  Goal: Discharge to home or other facility with appropriate resources  Description: INTERVENTIONS:  - Identify barriers to discharge w/patient and caregiver  - Arrange for needed discharge resources and transportation as appropriate  - Identify discharge learning needs (meds, wound care, etc.)  - Arrange for interpretive services to assist at discharge as needed  - Refer to Case Management Department for coordinating discharge planning if the patient needs post-hospital services based on physician/advanced practitioner order or complex needs related to functional status, cognitive ability, or social support system  Outcome: Progressing     Problem: Knowledge Deficit  Goal: Patient/family/caregiver demonstrates understanding of disease process, treatment plan, medications, and discharge instructions  Description: Complete learning assessment and assess knowledge base. Interventions:  - Provide teaching at level of understanding  - Provide teaching via preferred learning methods  Outcome: Progressing     Problem: Neurological Deficit  Goal: Neurological status is stable or improving  Description: Interventions:  - Monitor and assess patient's level of consciousness, motor function, sensory function, and level of assistance needed for ADLs. - Monitor and report changes from baseline. Collaborate with interdisciplinary team to initiate plan and implement interventions as ordered. - Provide and maintain a safe environment. - Consider seizure precautions. - Consider fall precautions. - Consider aspiration precautions. - Consider bleeding precautions. Outcome: Progressing     Problem: Activity Intolerance/Impaired Mobility  Goal: Mobility/activity is maintained at optimum level for patient  Description: Interventions:  - Assess and monitor patient  barriers to mobility and need for assistive/adaptive devices. - Assess patient's emotional response to limitations. - Collaborate with interdisciplinary team and initiate plans and interventions as ordered. - Encourage independent activity per ability.  - Maintain proper body alignment. - Perform active/passive rom as tolerated/ordered. - Plan activities to conserve energy.  - Turn patient as appropriate  Outcome: Progressing     Problem: Potential for Aspiration  Goal: Non-ventilated patient's risk of aspiration is minimized  Description: Assess and monitor vital signs, respiratory status, and labs (WBC). Monitor for signs of aspiration (tachypnea, cough, rales, wheezing, cyanosis, fever). - Assess and monitor patient's ability to swallow.   - Place patient up in chair to eat if possible. - HOB up at 90 degrees to eat if unable to get patient up into chair.  - Supervise patient during oral intake. - Instruct patient/ family to take small bites. - Instruct patient/ family to take small single sips when taking liquids. - Follow patient-specific strategies generated by speech pathologist.  Outcome: Progressing  Goal: Ventilated patient's risk of aspiration is minimized  Description: Assess and monitor vital signs, respiratory status, airway cuff pressure, and labs (WBC). Monitor for signs of aspiration (tachypnea, cough, rales, wheezing, cyanosis, fever). - Elevate head of bed 30 degrees if patient has tube feeding.  - Monitor tube feeding. Outcome: Progressing     Problem: Nutrition  Goal: Nutrition/Hydration status is improving  Description: Monitor and assess patient's nutrition/hydration status for malnutrition (ex- brittle hair, bruises, dry skin, pale skin and conjunctiva, muscle wasting, smooth red tongue, and disorientation). Collaborate with interdisciplinary team and initiate plan and interventions as ordered. Monitor patient's weight and dietary intake as ordered or per policy. Utilize nutrition screening tool and intervene per policy. Determine patient's food preferences and provide high-protein, high-caloric foods as appropriate. - Assist patient with eating.  - Allow adequate time for meals.  - Encourage patient to take dietary supplement as ordered. - Collaborate with clinical nutritionist.  - Include patient/family/caregiver in decisions related to nutrition.   Outcome: Progressing     Problem: Prexisting or High Potential for Compromised Skin Integrity  Goal: Skin integrity is maintained or improved  Description: INTERVENTIONS:  - Identify patients at risk for skin breakdown  - Assess and monitor skin integrity  - Assess and monitor nutrition and hydration status  - Monitor labs   - Assess for incontinence   - Turn and reposition patient  - Assist with mobility/ambulation  - Relieve pressure over bony prominences  - Avoid friction and shearing  - Provide appropriate hygiene as needed including keeping skin clean and dry  - Evaluate need for skin moisturizer/barrier cream  - Collaborate with interdisciplinary team   - Patient/family teaching  - Consider wound care consult   Outcome: Progressing

## 2023-09-02 NOTE — ASSESSMENT & PLAN NOTE
Lab Results   Component Value Date    EGFR 63 08/30/2023    EGFR 52 08/29/2023    EGFR 65 02/15/2023    CREATININE 1.12 08/30/2023    CREATININE 1.31 (H) 08/29/2023    CREATININE 1.08 02/15/2023     · No documented history of CKD but baseline creatinine appears to be 1.1-1.3 per chart review   · UA reviewed and unremarkable   · Provide gentle IV fluids overnight and monitor for improvement   · Avoid nephrotoxins and hypotension   · Continue home ACE/ARB for now  · Trend BMP You can access the [facility], offered by [facility], by registering with the following website: [email or web address]

## 2023-09-02 NOTE — ASSESSMENT & PLAN NOTE
Lab Results   Component Value Date    HGBA1C 8.4 (H) 08/29/2023       Recent Labs     09/01/23  1055 09/01/23  1645 09/01/23  2120 09/02/23  0724   POCGLU 294* 243* 174* 203*       Blood Sugar Average: Last 72 hrs:  (P) [id number]     · Poorly controlled evidenced by most recent A1c, follow-up repeat   · Home insulin regimen: Levimir 72 units twice daily + SSI with meals   · Patient blood sugar remain high, resume home dose of Levemir.   Sliding scale with hypoglycemia precaution

## 2023-09-03 LAB
GLUCOSE SERPL-MCNC: 201 MG/DL (ref 65–140)
GLUCOSE SERPL-MCNC: 205 MG/DL (ref 65–140)
GLUCOSE SERPL-MCNC: 249 MG/DL (ref 65–140)
GLUCOSE SERPL-MCNC: 265 MG/DL (ref 65–140)

## 2023-09-03 PROCEDURE — 82948 REAGENT STRIP/BLOOD GLUCOSE: CPT

## 2023-09-03 PROCEDURE — 99232 SBSQ HOSP IP/OBS MODERATE 35: CPT | Performed by: FAMILY MEDICINE

## 2023-09-03 RX ADMIN — INSULIN LISPRO 5 UNITS: 100 INJECTION, SOLUTION INTRAVENOUS; SUBCUTANEOUS at 16:37

## 2023-09-03 RX ADMIN — INSULIN LISPRO 2 UNITS: 100 INJECTION, SOLUTION INTRAVENOUS; SUBCUTANEOUS at 21:16

## 2023-09-03 RX ADMIN — PANTOPRAZOLE SODIUM 40 MG: 40 TABLET, DELAYED RELEASE ORAL at 16:36

## 2023-09-03 RX ADMIN — FLUTICASONE PROPIONATE 2 PUFF: 220 AEROSOL, METERED RESPIRATORY (INHALATION) at 21:17

## 2023-09-03 RX ADMIN — INSULIN LISPRO 4 UNITS: 100 INJECTION, SOLUTION INTRAVENOUS; SUBCUTANEOUS at 11:44

## 2023-09-03 RX ADMIN — LOSARTAN POTASSIUM 50 MG: 50 TABLET, FILM COATED ORAL at 08:00

## 2023-09-03 RX ADMIN — INSULIN LISPRO 4 UNITS: 100 INJECTION, SOLUTION INTRAVENOUS; SUBCUTANEOUS at 07:57

## 2023-09-03 RX ADMIN — INSULIN DETEMIR 30 UNITS: 100 INJECTION, SOLUTION SUBCUTANEOUS at 08:00

## 2023-09-03 RX ADMIN — ATORVASTATIN CALCIUM 40 MG: 40 TABLET, FILM COATED ORAL at 16:36

## 2023-09-03 RX ADMIN — INSULIN LISPRO 6 UNITS: 100 INJECTION, SOLUTION INTRAVENOUS; SUBCUTANEOUS at 16:36

## 2023-09-03 RX ADMIN — LEVOTHYROXINE SODIUM 125 MCG: 125 TABLET ORAL at 08:00

## 2023-09-03 RX ADMIN — FLUTICASONE PROPIONATE 2 PUFF: 220 AEROSOL, METERED RESPIRATORY (INHALATION) at 10:00

## 2023-09-03 RX ADMIN — INSULIN DETEMIR 30 UNITS: 100 INJECTION, SOLUTION SUBCUTANEOUS at 21:16

## 2023-09-03 RX ADMIN — ASPIRIN 81 MG: 81 TABLET, COATED ORAL at 08:00

## 2023-09-03 RX ADMIN — INSULIN LISPRO 5 UNITS: 100 INJECTION, SOLUTION INTRAVENOUS; SUBCUTANEOUS at 11:44

## 2023-09-03 RX ADMIN — VERAPAMIL HYDROCHLORIDE 120 MG: 120 TABLET, FILM COATED, EXTENDED RELEASE ORAL at 08:00

## 2023-09-03 RX ADMIN — PANTOPRAZOLE SODIUM 40 MG: 40 TABLET, DELAYED RELEASE ORAL at 06:38

## 2023-09-03 RX ADMIN — ENOXAPARIN SODIUM 40 MG: 40 INJECTION SUBCUTANEOUS at 13:59

## 2023-09-03 RX ADMIN — INSULIN LISPRO 5 UNITS: 100 INJECTION, SOLUTION INTRAVENOUS; SUBCUTANEOUS at 07:58

## 2023-09-03 NOTE — PLAN OF CARE
Problem: MOBILITY - ADULT  Goal: Maintain or return to baseline ADL function  Description: INTERVENTIONS:  -  Assess patient's ability to carry out ADLs; assess patient's baseline for ADL function and identify physical deficits which impact ability to perform ADLs (bathing, care of mouth/teeth, toileting, grooming, dressing, etc.)  - Assess/evaluate cause of self-care deficits   - Assess range of motion  - Assess patient's mobility; develop plan if impaired  - Assess patient's need for assistive devices and provide as appropriate  - Encourage maximum independence but intervene and supervise when necessary  - Involve family in performance of ADLs  - Assess for home care needs following discharge   - Consider OT consult to assist with ADL evaluation and planning for discharge  - Provide patient education as appropriate  Outcome: Progressing  Goal: Maintains/Returns to pre admission functional level  Description: INTERVENTIONS:  - Perform BMAT or MOVE assessment daily.   - Set and communicate daily mobility goal to care team and patient/family/caregiver. - Collaborate with rehabilitation services on mobility goals if consulted  - Perform Range of Motion 2 times a day. - Reposition patient every 2 hours.   - Dangle patient 2 times a day  - Stand patient 2 times a day  - Ambulate patient 2 times a day  - Out of bed to chair 2 times a day   - Out of bed for meals 2 times a day  - Out of bed for toileting  - Record patient progress and toleration of activity level   Outcome: Progressing     Problem: PAIN - ADULT  Goal: Verbalizes/displays adequate comfort level or baseline comfort level  Description: Interventions:  - Encourage patient to monitor pain and request assistance  - Assess pain using appropriate pain scale  - Administer analgesics based on type and severity of pain and evaluate response  - Implement non-pharmacological measures as appropriate and evaluate response  - Consider cultural and social influences on pain and pain management  - Notify physician/advanced practitioner if interventions unsuccessful or patient reports new pain  Outcome: Progressing     Problem: INFECTION - ADULT  Goal: Absence or prevention of progression during hospitalization  Description: INTERVENTIONS:  - Assess and monitor for signs and symptoms of infection  - Monitor lab/diagnostic results  - Monitor all insertion sites, i.e. indwelling lines, tubes, and drains  - Monitor endotracheal if appropriate and nasal secretions for changes in amount and color  - [place] appropriate cooling/warming therapies per order  - Administer medications as ordered  - Instruct and encourage patient and family to use good hand hygiene technique  - Identify and instruct in appropriate isolation precautions for identified infection/condition  Outcome: Progressing  Goal: Absence of fever/infection during neutropenic period  Description: INTERVENTIONS:  - Monitor WBC    Outcome: Progressing     Problem: SAFETY ADULT  Goal: Maintain or return to baseline ADL function  Description: INTERVENTIONS:  -  Assess patient's ability to carry out ADLs; assess patient's baseline for ADL function and identify physical deficits which impact ability to perform ADLs (bathing, care of mouth/teeth, toileting, grooming, dressing, etc.)  - Assess/evaluate cause of self-care deficits   - Assess range of motion  - Assess patient's mobility; develop plan if impaired  - Assess patient's need for assistive devices and provide as appropriate  - Encourage maximum independence but intervene and supervise when necessary  - Involve family in performance of ADLs  - Assess for home care needs following discharge   - Consider OT consult to assist with ADL evaluation and planning for discharge  - Provide patient education as appropriate  Outcome: Progressing  Goal: Maintains/Returns to pre admission functional level  Description: INTERVENTIONS:  - Perform BMAT or MOVE assessment daily.   - Set and communicate daily mobility goal to care team and patient/family/caregiver. - Collaborate with rehabilitation services on mobility goals if consulted  - Perform Range of Motion 2 times a day. - Reposition patient every 2 hours.   - Dangle patient 2 times a day  - Stand patient 2 times a day  - Ambulate patient 2 times a day  - Out of bed to chair 2 times a day   - Out of bed for meals 2 times a day  - Out of bed for toileting  - Record patient progress and toleration of activity level   Outcome: Progressing  Goal: Patient will remain free of falls  Description: INTERVENTIONS:  - Educate patient/family on patient safety including physical limitations  - Instruct patient to call for assistance with activity   - Consult OT/PT to assist with strengthening/mobility   - Keep Call bell within reach  - Keep bed low and locked with side rails adjusted as appropriate  - Keep care items and personal belongings within reach  - Initiate and maintain comfort rounds  - Make Fall Risk Sign visible to staff  - Offer Toileting every 2 Hours, in advance of need  - Initiate/Maintain bed/chair alarm  - Obtain necessary fall risk management equipment: na  - Apply yellow socks and bracelet for high fall risk patients  - Consider moving patient to room near nurses station  Outcome: Progressing     Problem: DISCHARGE PLANNING  Goal: Discharge to home or other facility with appropriate resources  Description: INTERVENTIONS:  - Identify barriers to discharge w/patient and caregiver  - Arrange for needed discharge resources and transportation as appropriate  - Identify discharge learning needs (meds, wound care, etc.)  - Arrange for interpretive services to assist at discharge as needed  - Refer to Case Management Department for coordinating discharge planning if the patient needs post-hospital services based on physician/advanced practitioner order or complex needs related to functional status, cognitive ability, or social support system  Outcome: Progressing     Problem: Knowledge Deficit  Goal: Patient/family/caregiver demonstrates understanding of disease process, treatment plan, medications, and discharge instructions  Description: Complete learning assessment and assess knowledge base. Interventions:  - Provide teaching at level of understanding  - Provide teaching via preferred learning methods  Outcome: Progressing     Problem: Neurological Deficit  Goal: Neurological status is stable or improving  Description: Interventions:  - Monitor and assess patient's level of consciousness, motor function, sensory function, and level of assistance needed for ADLs. - Monitor and report changes from baseline. Collaborate with interdisciplinary team to initiate plan and implement interventions as ordered. - Provide and maintain a safe environment. - Consider seizure precautions. - Consider fall precautions. - Consider aspiration precautions. - Consider bleeding precautions. Outcome: Progressing     Problem: Activity Intolerance/Impaired Mobility  Goal: Mobility/activity is maintained at optimum level for patient  Description: Interventions:  - Assess and monitor patient  barriers to mobility and need for assistive/adaptive devices. - Assess patient's emotional response to limitations. - Collaborate with interdisciplinary team and initiate plans and interventions as ordered. - Encourage independent activity per ability.  - Maintain proper body alignment. - Perform active/passive rom as tolerated/ordered. - Plan activities to conserve energy.  - Turn patient as appropriate  Outcome: Progressing     Problem: Communication Impairment  Goal: Ability to express needs and understand communication  Description: Assess patient's communication skills and ability to understand information. Patient will demonstrate use of effective communication techniques, alternative methods of communication and understanding even if not able to speak.      - Encourage communication and provide alternate methods of communication as needed. - Collaborate with case management/ for discharge needs. - Include patient/family/caregiver in decisions related to communication. Outcome: Progressing     Problem: Potential for Aspiration  Goal: Non-ventilated patient's risk of aspiration is minimized  Description: Assess and monitor vital signs, respiratory status, and labs (WBC). Monitor for signs of aspiration (tachypnea, cough, rales, wheezing, cyanosis, fever). - Assess and monitor patient's ability to swallow. - Place patient up in chair to eat if possible. - HOB up at 90 degrees to eat if unable to get patient up into chair.  - Supervise patient during oral intake. - Instruct patient/ family to take small bites. - Instruct patient/ family to take small single sips when taking liquids. - Follow patient-specific strategies generated by speech pathologist.  Outcome: Progressing  Goal: Ventilated patient's risk of aspiration is minimized  Description: Assess and monitor vital signs, respiratory status, airway cuff pressure, and labs (WBC). Monitor for signs of aspiration (tachypnea, cough, rales, wheezing, cyanosis, fever). - Elevate head of bed 30 degrees if patient has tube feeding.  - Monitor tube feeding. Outcome: Progressing     Problem: Nutrition  Goal: Nutrition/Hydration status is improving  Description: Monitor and assess patient's nutrition/hydration status for malnutrition (ex- brittle hair, bruises, dry skin, pale skin and conjunctiva, muscle wasting, smooth red tongue, and disorientation). Collaborate with interdisciplinary team and initiate plan and interventions as ordered. Monitor patient's weight and dietary intake as ordered or per policy. Utilize nutrition screening tool and intervene per policy. Determine patient's food preferences and provide high-protein, high-caloric foods as appropriate.      - Assist patient with eating.  - Allow adequate time for meals.  - Encourage patient to take dietary supplement as ordered. - Collaborate with clinical nutritionist.  - Include patient/family/caregiver in decisions related to nutrition.   Outcome: Progressing     Problem: Prexisting or High Potential for Compromised Skin Integrity  Goal: Skin integrity is maintained or improved  Description: INTERVENTIONS:  - Identify patients at risk for skin breakdown  - Assess and monitor skin integrity  - Assess and monitor nutrition and hydration status  - Monitor labs   - Assess for incontinence   - Turn and reposition patient  - Assist with mobility/ambulation  - Relieve pressure over bony prominences  - Avoid friction and shearing  - Provide appropriate hygiene as needed including keeping skin clean and dry  - Evaluate need for skin moisturizer/barrier cream  - Collaborate with interdisciplinary team   - Patient/family teaching  - Consider wound care consult   Outcome: Progressing

## 2023-09-03 NOTE — PROGRESS NOTES
[address] [facility]  Progress Note  Name: Alexei Emerson  MRN: [id number]  Unit/Bed#: -01 I Date of Admission: 8/29/2023   Date of Service: 9/3/2023 I Hospital Day: 5    Assessment/Plan   * Acute CVA (cerebrovascular accident) [facility])  Assessment & Plan  Presented with 5-7 days history of  slurred speech. Admitted to stroke pathway. 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 · CTA head/neck: Negative for large vessel occlusion, dissection, or aneurysm. 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 · Status post 325 mg ASA on arrival, continue with 81 mg daily and home dose satin   · MRI brain: Small acute juxtacortical white matter infarct within the right posterior frontal lobe, without mass effect or hemorrhagic transformation. Advanced chronic microangiopathic change elsewhere within the brain parenchyma. Chronic microhemorrhages within the left temporal occipital junction and left posterior medial cerebellum. No acute hemorrhage. · Echocardiogram normal  · , TSH normal  · A1c 8.4. improved from 9.4  · Neurology consulted and recommending repeat echo as well as loop recorder-repeat limited echo is remain very difficult to study cardiac chambers. Will also need follow up in 6 weeks with neurovascular  · PT/OT recommending acute rehab-pending authorization. Patient still having some balance issues-remain unsafe for discharge to home. Patient will need rehab. · GI consult appreciated, recommending conservative management and treat eosinophilic esophagitis. Recommending outpatient follow-up-for procedures since recent stroke.     Eosinophilic esophagitis  Assessment & Plan  · Hx of esophageal stricture requiring dilation  · Speech consulted as patient having difficulty swallowing  · GI consulted and recommended starting fluticasone and protonix BID, also doing esophagram. Also recommending outpatient follow up and possible EGD in a few weeks after monitoring on eosinophilic esophagitis medications  · Esophagogram normal    Hyperlipidemia  Assessment & Plan  Lipid panel: cholesterol 159, ,    · Continued on home dose pravastatin     Penetrating atherosclerotic ulcer of aorta (HCC)  Assessment & Plan  Incidental finding on CTA head/neck: 2.2 cm penetrating atheromatous ulcer along lateral wall of aortic arch projecting inferolaterally   · Findings discussed with patient and family at time of admission   · Patient will need to follow-up cardiothoracic surgery outpatient basis.   · Continue with ASA and statin     Thyroid nodule  Assessment & Plan  Incidental finding on CTA head/neck: 1.7 cm peripherally calcified nodule in right thyroid lobe  · Findings discussed with patient and family on admission   · Outpatient follow-up for US     CKD (chronic kidney disease)  Assessment & Plan  Lab Results   Component Value Date    EGFR 63 08/30/2023    EGFR 52 08/29/2023    EGFR 65 02/15/2023    CREATININE 1.12 08/30/2023    CREATININE 1.31 (H) 08/29/2023    CREATININE 1.08 02/15/2023     · No documented history of CKD but baseline creatinine appears to be 1.1-1.3 per chart review   · UA reviewed and unremarkable   · Provide gentle IV fluids overnight and monitor for improvement   · Avoid nephrotoxins and hypotension   · Continue home ACE/ARB for now  · Trend BMP    Hypothyroidism  Assessment & Plan  · TSH elevated 4.97, free T4   · Continue home dose levothyroxine for now     Hypertension  Assessment & Plan  · Hypertensive urgency on arrival, now improved   · Continue home dose losartan and hospital formulary verapamil   · Monitor routinely     Type 2 diabetes mellitus with hyperglycemia, with long-term current use of insulin [facility])  Assessment & Plan  Lab Results   Component Value Date    HGBA1C 8.4 (H) 08/29/2023       Recent Labs     09/02/23  1053 09/02/23  1644 09/02/23  2119 09/03/23  6403 POCGLU 312* 229* 254* 205*       Blood Sugar Average: Last 72 hrs:  (P) 373.9324408027227107     · Poorly controlled evidenced by most recent A1c, follow-up repeat   · Home insulin regimen: Levimir 72 units twice daily + SSI with meals   · Patient blood sugar remain high, resume home dose of Levemir. Sliding scale with hypoglycemia precaution               VTE Pharmacologic Prophylaxis: VTE Score: 9 High Risk (Score >/= 5) - Pharmacological DVT Prophylaxis Ordered: enoxaparin (Lovenox). Sequential Compression Devices Ordered. Patient Centered Rounds: I performed bedside rounds with nursing staff today. Discussions with Specialists or Other Care Team Provider: None    Education and Discussions with Family / Patient: Updated  (son) at bedside. Total Time Spent on Date of Encounter in care of patient: 10 minutes This time was spent on one or more of the following: performing physical exam; counseling and coordination of care; obtaining or reviewing history; documenting in the medical record; reviewing/ordering tests, medications or procedures; communicating with other healthcare professionals and discussing with patient's family/caregivers. Current Length of Stay: 5 day(s)  Current Patient Status: Inpatient   Certification Statement: The patient will continue to require additional inpatient hospital stay due to To monitor above conditions  Discharge Plan: Anticipate discharge in 48-72 hrs to rehab facility. Code Status: Level 3 - DNAR and DNI    Subjective:   Seen and evaluated and examined. Resting comfortably. Denies any significant complaint. Objective:     Vitals:   Temp (24hrs), Av.5 °F (36.4 °C), Min:97.3 °F (36.3 °C), Max:97.6 °F (36.4 °C)    Temp:  [97.3 °F (36.3 °C)-97.6 °F (36.4 °C)] 97.3 °F (36.3 °C)  HR:  [59-78] 69  Resp:  [16-18] 16  BP: ()/(61-78) 153/78  SpO2:  [94 %-98 %] 94 %  Body mass index is 33.19 kg/m².      Input and Output Summary (last 24 hours): Intake/Output Summary (Last 24 hours) at 9/3/2023 0945  Last data filed at 9/3/2023 0806  Gross per 24 hour   Intake --   Output 925 ml   Net -925 ml       Physical Exam:   Physical Exam  Vitals and nursing note reviewed. Constitutional:       Appearance: Normal appearance. He is obese. He is not ill-appearing or diaphoretic. HENT:      Nose: Nose normal.      Mouth/Throat:      Mouth: Mucous membranes are moist.   Eyes:      General: No scleral icterus. Left eye: No discharge. Extraocular Movements: Extraocular movements intact. Conjunctiva/sclera: Conjunctivae normal.      Pupils: Pupils are equal, round, and reactive to light. Cardiovascular:      Rate and Rhythm: Normal rate. Heart sounds: No murmur heard. No friction rub. No gallop. Pulmonary:      Effort: Pulmonary effort is normal. No respiratory distress. Breath sounds: No stridor. No wheezing or rhonchi. Abdominal:      General: Abdomen is flat. Bowel sounds are normal. There is no distension. Palpations: There is no mass. Tenderness: There is no abdominal tenderness. Hernia: No hernia is present. Musculoskeletal:         General: No swelling, tenderness, deformity or signs of injury. Normal range of motion. Cervical back: Normal range of motion. Skin:     General: Skin is warm. Neurological:      General: No focal deficit present. Mental Status: He is alert and oriented to person, place, and time. Cranial Nerves: No cranial nerve deficit. Sensory: No sensory deficit. Motor: No weakness.       Coordination: Coordination normal.      Deep Tendon Reflexes: Reflexes normal.         Additional Data:     Labs:  Results from last 7 days   Lab Units 08/30/23  0508   WBC Thousand/uL 8.20   HEMOGLOBIN g/dL 15.5   HEMATOCRIT % 45.1   PLATELETS Thousands/uL 183     Results from last 7 days   Lab Units 08/30/23  0508   SODIUM mmol/L 135   POTASSIUM mmol/L 3.7   CHLORIDE mmol/L 104 CO2 mmol/L 24   BUN mg/dL 17   CREATININE mg/dL 1.12   ANION GAP mmol/L 7   CALCIUM mg/dL 8.6   ALBUMIN g/dL 3.5   TOTAL BILIRUBIN mg/dL 1.38*   ALK PHOS U/L 59   ALT U/L 10   AST U/L 9*   GLUCOSE RANDOM mg/dL 121     Results from last 7 days   Lab Units 08/29/23  0803   INR  0.91     Results from last 7 days   Lab Units 09/03/23  0708 09/02/23  2119 09/02/23  1644 09/02/23  1053 09/02/23  0724 09/01/23  2120 09/01/23  1645 09/01/23  1055 09/01/23  0714 08/31/23  2046 08/31/23  1526 08/31/23  1135   POC GLUCOSE mg/dl 205* 254* 229* 312* 203* 174* 243* 294* 158* 201* 296* 198*     Results from last 7 days   Lab Units 08/29/23  0803   HEMOGLOBIN A1C % 8.4*           Lines/Drains:  Invasive Devices     Peripheral Intravenous Line  Duration           Peripheral IV 08/29/23 Dorsal (posterior); Left;Proximal Forearm 5 days                      Imaging: No pertinent imaging reviewed.     Recent Cultures (last 7 days):         Last 24 Hours Medication List:   Current Facility-Administered Medications   Medication Dose Route Frequency Provider Last Rate   • aspirin  81 mg Oral Daily Rebeca Dominique PA-C     • atorvastatin  40 mg Oral Daily With Omnclaudia Otero MD     • enoxaparin  40 mg Subcutaneous Daily Rebeca Dominique PA-C     • fluticasone  2 puff Inhalation Q12H [address] [facility], DO     • insulin detemir  30 Units Subcutaneous Q12H [address]  Sara Otero MD     • insulin lispro  1-6 Units Subcutaneous HS Kash Chan MD     • insulin lispro  2-12 Units Subcutaneous TID AC Sara Otero MD     • insulin lispro  5 Units Subcutaneous TID With Meals Gigi Otero MD     • levothyroxine  125 mcg Oral Daily Rebeca KASHIF Dominique PA-C     • losartan  50 mg Oral Daily Rebeca E TALHA Dominique     • pantoprazole  40 mg Oral BID AC Lisa Garcia PA-C     • verapamil  120 mg Oral Daily Rebeca Dominique PA-C          Today, Patient Was Seen By: Kash Chan MD    **Please Note: This note may have been constructed using a voice recognition system. **

## 2023-09-03 NOTE — ASSESSMENT & PLAN NOTE
Presented with 5-7 days history of  slurred speech. Admitted to stroke pathway. 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 · CTA head/neck: Negative for large vessel occlusion, dissection, or aneurysm. 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 · Status post 325 mg ASA on arrival, continue with 81 mg daily and home dose satin   · MRI brain: Small acute juxtacortical white matter infarct within the right posterior frontal lobe, without mass effect or hemorrhagic transformation. Advanced chronic microangiopathic change elsewhere within the brain parenchyma. Chronic microhemorrhages within the left temporal occipital junction and left posterior medial cerebellum. No acute hemorrhage. · Echocardiogram normal  · , TSH normal  · A1c 8.4. improved from 9.4  · Neurology consulted and recommending repeat echo as well as loop recorder-repeat limited echo is remain very difficult to study cardiac chambers. Will also need follow up in 6 weeks with neurovascular  · PT/OT recommending acute rehab-pending authorization. Patient still having some balance issues-remain unsafe for discharge to home. Patient will need rehab. · GI consult appreciated, recommending conservative management and treat eosinophilic esophagitis. Recommending outpatient follow-up-for procedures since recent stroke.

## 2023-09-03 NOTE — ASSESSMENT & PLAN NOTE
Lab Results   Component Value Date    HGBA1C 8.4 (H) 08/29/2023       Recent Labs     09/02/23  1053 09/02/23  1644 09/02/23  2119 09/03/23  0708   POCGLU 312* 229* 254* 205*       Blood Sugar Average: Last 72 hrs:  (P) [id number]     · Poorly controlled evidenced by most recent A1c, follow-up repeat   · Home insulin regimen: Levimir 72 units twice daily + SSI with meals   · Patient blood sugar remain high, resume home dose of Levemir.   Sliding scale with hypoglycemia precaution

## 2023-09-04 LAB
GLUCOSE SERPL-MCNC: 176 MG/DL (ref 65–140)
GLUCOSE SERPL-MCNC: 263 MG/DL (ref 65–140)
GLUCOSE SERPL-MCNC: 270 MG/DL (ref 65–140)
GLUCOSE SERPL-MCNC: 312 MG/DL (ref 65–140)

## 2023-09-04 PROCEDURE — 82948 REAGENT STRIP/BLOOD GLUCOSE: CPT

## 2023-09-04 PROCEDURE — 99232 SBSQ HOSP IP/OBS MODERATE 35: CPT | Performed by: FAMILY MEDICINE

## 2023-09-04 RX ADMIN — FLUTICASONE PROPIONATE 2 PUFF: 220 AEROSOL, METERED RESPIRATORY (INHALATION) at 20:18

## 2023-09-04 RX ADMIN — INSULIN DETEMIR 30 UNITS: 100 INJECTION, SOLUTION SUBCUTANEOUS at 21:02

## 2023-09-04 RX ADMIN — FLUTICASONE PROPIONATE 2 PUFF: 220 AEROSOL, METERED RESPIRATORY (INHALATION) at 08:17

## 2023-09-04 RX ADMIN — INSULIN LISPRO 3 UNITS: 100 INJECTION, SOLUTION INTRAVENOUS; SUBCUTANEOUS at 21:03

## 2023-09-04 RX ADMIN — INSULIN LISPRO 5 UNITS: 100 INJECTION, SOLUTION INTRAVENOUS; SUBCUTANEOUS at 08:15

## 2023-09-04 RX ADMIN — PANTOPRAZOLE SODIUM 40 MG: 40 TABLET, DELAYED RELEASE ORAL at 06:19

## 2023-09-04 RX ADMIN — INSULIN LISPRO 2 UNITS: 100 INJECTION, SOLUTION INTRAVENOUS; SUBCUTANEOUS at 08:15

## 2023-09-04 RX ADMIN — LOSARTAN POTASSIUM 50 MG: 50 TABLET, FILM COATED ORAL at 08:22

## 2023-09-04 RX ADMIN — INSULIN LISPRO 8 UNITS: 100 INJECTION, SOLUTION INTRAVENOUS; SUBCUTANEOUS at 16:51

## 2023-09-04 RX ADMIN — INSULIN LISPRO 5 UNITS: 100 INJECTION, SOLUTION INTRAVENOUS; SUBCUTANEOUS at 16:51

## 2023-09-04 RX ADMIN — INSULIN LISPRO 6 UNITS: 100 INJECTION, SOLUTION INTRAVENOUS; SUBCUTANEOUS at 11:55

## 2023-09-04 RX ADMIN — PANTOPRAZOLE SODIUM 40 MG: 40 TABLET, DELAYED RELEASE ORAL at 16:51

## 2023-09-04 RX ADMIN — LEVOTHYROXINE SODIUM 125 MCG: 125 TABLET ORAL at 08:21

## 2023-09-04 RX ADMIN — ENOXAPARIN SODIUM 40 MG: 40 INJECTION SUBCUTANEOUS at 13:03

## 2023-09-04 RX ADMIN — VERAPAMIL HYDROCHLORIDE 120 MG: 120 TABLET, FILM COATED, EXTENDED RELEASE ORAL at 08:21

## 2023-09-04 RX ADMIN — ATORVASTATIN CALCIUM 40 MG: 40 TABLET, FILM COATED ORAL at 16:51

## 2023-09-04 RX ADMIN — INSULIN LISPRO 5 UNITS: 100 INJECTION, SOLUTION INTRAVENOUS; SUBCUTANEOUS at 11:55

## 2023-09-04 RX ADMIN — ASPIRIN 81 MG: 81 TABLET, COATED ORAL at 08:22

## 2023-09-04 RX ADMIN — INSULIN DETEMIR 30 UNITS: 100 INJECTION, SOLUTION SUBCUTANEOUS at 08:16

## 2023-09-04 NOTE — PLAN OF CARE
Problem: MOBILITY - ADULT  Goal: Maintain or return to baseline ADL function  Description: INTERVENTIONS:  -  Assess patient's ability to carry out ADLs; assess patient's baseline for ADL function and identify physical deficits which impact ability to perform ADLs (bathing, care of mouth/teeth, toileting, grooming, dressing, etc.)  - Assess/evaluate cause of self-care deficits   - Assess range of motion  - Assess patient's mobility; develop plan if impaired  - Assess patient's need for assistive devices and provide as appropriate  - Encourage maximum independence but intervene and supervise when necessary  - Involve family in performance of ADLs  - Assess for home care needs following discharge   - Consider OT consult to assist with ADL evaluation and planning for discharge  - Provide patient education as appropriate  Outcome: Progressing  Goal: Maintains/Returns to pre admission functional level  Description: INTERVENTIONS:  - Perform BMAT or MOVE assessment daily.   - Set and communicate daily mobility goal to care team and patient/family/caregiver. - Collaborate with rehabilitation services on mobility goals if consulted  - Perform Range of Motion 2 times a day. - Reposition patient every 2 hours.   - Dangle patient 2 times a day  - Stand patient 2 times a day  - Ambulate patient 2 times a day  - Out of bed to chair 2 times a day   - Out of bed for meals 2 times a day  - Out of bed for toileting  - Record patient progress and toleration of activity level   Outcome: Progressing     Problem: PAIN - ADULT  Goal: Verbalizes/displays adequate comfort level or baseline comfort level  Description: Interventions:  - Encourage patient to monitor pain and request assistance  - Assess pain using appropriate pain scale  - Administer analgesics based on type and severity of pain and evaluate response  - Implement non-pharmacological measures as appropriate and evaluate response  - Consider cultural and social influences on pain and pain management  - Notify physician/advanced practitioner if interventions unsuccessful or patient reports new pain  Outcome: Progressing     Problem: INFECTION - ADULT  Goal: Absence or prevention of progression during hospitalization  Description: INTERVENTIONS:  - Assess and monitor for signs and symptoms of infection  - Monitor lab/diagnostic results  - Monitor all insertion sites, i.e. indwelling lines, tubes, and drains  - Monitor endotracheal if appropriate and nasal secretions for changes in amount and color  - [place] appropriate cooling/warming therapies per order  - Administer medications as ordered  - Instruct and encourage patient and family to use good hand hygiene technique  - Identify and instruct in appropriate isolation precautions for identified infection/condition  Outcome: Progressing  Goal: Absence of fever/infection during neutropenic period  Description: INTERVENTIONS:  - Monitor WBC    Outcome: Progressing     Problem: SAFETY ADULT  Goal: Maintain or return to baseline ADL function  Description: INTERVENTIONS:  -  Assess patient's ability to carry out ADLs; assess patient's baseline for ADL function and identify physical deficits which impact ability to perform ADLs (bathing, care of mouth/teeth, toileting, grooming, dressing, etc.)  - Assess/evaluate cause of self-care deficits   - Assess range of motion  - Assess patient's mobility; develop plan if impaired  - Assess patient's need for assistive devices and provide as appropriate  - Encourage maximum independence but intervene and supervise when necessary  - Involve family in performance of ADLs  - Assess for home care needs following discharge   - Consider OT consult to assist with ADL evaluation and planning for discharge  - Provide patient education as appropriate  Outcome: Progressing  Goal: Maintains/Returns to pre admission functional level  Description: INTERVENTIONS:  - Perform BMAT or MOVE assessment daily.   - Set and communicate daily mobility goal to care team and patient/family/caregiver. - Collaborate with rehabilitation services on mobility goals if consulted  - Perform Range of Motion 2 times a day. - Reposition patient every 2 hours.   - Dangle patient 2 times a day  - Stand patient 2 times a day  - Ambulate patient 2 times a day  - Out of bed to chair 2 times a day   - Out of bed for meals 2 times a day  - Out of bed for toileting  - Record patient progress and toleration of activity level   Outcome: Progressing  Goal: Patient will remain free of falls  Description: INTERVENTIONS:  - Educate patient/family on patient safety including physical limitations  - Instruct patient to call for assistance with activity   - Consult OT/PT to assist with strengthening/mobility   - Keep Call bell within reach  - Keep bed low and locked with side rails adjusted as appropriate  - Keep care items and personal belongings within reach  - Initiate and maintain comfort rounds  - Make Fall Risk Sign visible to staff  - Offer Toileting every 2 Hours, in advance of need  - Initiate/Maintain bed/chair alarm  - Obtain necessary fall risk management equipment: na  - Apply yellow socks and bracelet for high fall risk patients  - Consider moving patient to room near nurses station  Outcome: Progressing     Problem: DISCHARGE PLANNING  Goal: Discharge to home or other facility with appropriate resources  Description: INTERVENTIONS:  - Identify barriers to discharge w/patient and caregiver  - Arrange for needed discharge resources and transportation as appropriate  - Identify discharge learning needs (meds, wound care, etc.)  - Arrange for interpretive services to assist at discharge as needed  - Refer to Case Management Department for coordinating discharge planning if the patient needs post-hospital services based on physician/advanced practitioner order or complex needs related to functional status, cognitive ability, or social support system  Outcome: Progressing     Problem: Knowledge Deficit  Goal: Patient/family/caregiver demonstrates understanding of disease process, treatment plan, medications, and discharge instructions  Description: Complete learning assessment and assess knowledge base. Interventions:  - Provide teaching at level of understanding  - Provide teaching via preferred learning methods  Outcome: Progressing     Problem: Neurological Deficit  Goal: Neurological status is stable or improving  Description: Interventions:  - Monitor and assess patient's level of consciousness, motor function, sensory function, and level of assistance needed for ADLs. - Monitor and report changes from baseline. Collaborate with interdisciplinary team to initiate plan and implement interventions as ordered. - Provide and maintain a safe environment. - Consider seizure precautions. - Consider fall precautions. - Consider aspiration precautions. - Consider bleeding precautions. Outcome: Progressing     Problem: Activity Intolerance/Impaired Mobility  Goal: Mobility/activity is maintained at optimum level for patient  Description: Interventions:  - Assess and monitor patient  barriers to mobility and need for assistive/adaptive devices. - Assess patient's emotional response to limitations. - Collaborate with interdisciplinary team and initiate plans and interventions as ordered. - Encourage independent activity per ability.  - Maintain proper body alignment. - Perform active/passive rom as tolerated/ordered. - Plan activities to conserve energy.  - Turn patient as appropriate  Outcome: Progressing     Problem: Communication Impairment  Goal: Ability to express needs and understand communication  Description: Assess patient's communication skills and ability to understand information. Patient will demonstrate use of effective communication techniques, alternative methods of communication and understanding even if not able to speak.      - Encourage communication and provide alternate methods of communication as needed. - Collaborate with case management/ for discharge needs. - Include patient/family/caregiver in decisions related to communication. Outcome: Progressing     Problem: Potential for Aspiration  Goal: Non-ventilated patient's risk of aspiration is minimized  Description: Assess and monitor vital signs, respiratory status, and labs (WBC). Monitor for signs of aspiration (tachypnea, cough, rales, wheezing, cyanosis, fever). - Assess and monitor patient's ability to swallow. - Place patient up in chair to eat if possible. - HOB up at 90 degrees to eat if unable to get patient up into chair.  - Supervise patient during oral intake. - Instruct patient/ family to take small bites. - Instruct patient/ family to take small single sips when taking liquids. - Follow patient-specific strategies generated by speech pathologist.  Outcome: Progressing  Goal: Ventilated patient's risk of aspiration is minimized  Description: Assess and monitor vital signs, respiratory status, airway cuff pressure, and labs (WBC). Monitor for signs of aspiration (tachypnea, cough, rales, wheezing, cyanosis, fever). - Elevate head of bed 30 degrees if patient has tube feeding.  - Monitor tube feeding. Outcome: Progressing     Problem: Nutrition  Goal: Nutrition/Hydration status is improving  Description: Monitor and assess patient's nutrition/hydration status for malnutrition (ex- brittle hair, bruises, dry skin, pale skin and conjunctiva, muscle wasting, smooth red tongue, and disorientation). Collaborate with interdisciplinary team and initiate plan and interventions as ordered. Monitor patient's weight and dietary intake as ordered or per policy. Utilize nutrition screening tool and intervene per policy. Determine patient's food preferences and provide high-protein, high-caloric foods as appropriate.      - Assist patient with eating.  - Allow adequate time for meals.  - Encourage patient to take dietary supplement as ordered. - Collaborate with clinical nutritionist.  - Include patient/family/caregiver in decisions related to nutrition.   Outcome: Progressing     Problem: Prexisting or High Potential for Compromised Skin Integrity  Goal: Skin integrity is maintained or improved  Description: INTERVENTIONS:  - Identify patients at risk for skin breakdown  - Assess and monitor skin integrity  - Assess and monitor nutrition and hydration status  - Monitor labs   - Assess for incontinence   - Turn and reposition patient  - Assist with mobility/ambulation  - Relieve pressure over bony prominences  - Avoid friction and shearing  - Provide appropriate hygiene as needed including keeping skin clean and dry  - Evaluate need for skin moisturizer/barrier cream  - Collaborate with interdisciplinary team   - Patient/family teaching  - Consider wound care consult   Outcome: Progressing

## 2023-09-04 NOTE — PROGRESS NOTES
[address] [facility]  Progress Note  Name: Kristel Olguin  MRN: [id number]  Unit/Bed#: -01 I Date of Admission: 8/29/2023   Date of Service: 9/4/2023 I Hospital Day: 6    Assessment/Plan   * Acute CVA (cerebrovascular accident) [facility])  Assessment & Plan  Presented with 5-7 days history of  slurred speech. Admitted to stroke pathway. 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 · CTA head/neck: Negative for large vessel occlusion, dissection, or aneurysm. 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 · Status post 325 mg ASA on arrival, continue with 81 mg daily and home dose satin   · MRI brain: Small acute juxtacortical white matter infarct within the right posterior frontal lobe, without mass effect or hemorrhagic transformation. Advanced chronic microangiopathic change elsewhere within the brain parenchyma. Chronic microhemorrhages within the left temporal occipital junction and left posterior medial cerebellum. No acute hemorrhage. · Echocardiogram normal  · , TSH normal  · A1c 8.4. improved from 9.4  · Neurology consulted and recommending repeat echo as well as loop recorder-repeat limited echo is remain very difficult to study cardiac chambers. Will also need follow up in 6 weeks with neurovascular  · PT/OT recommending acute rehab-pending authorization. Patient still having some balance issues-remain unsafe for discharge to home. Patient will need rehab. · GI consult appreciated, recommending conservative management and treat eosinophilic esophagitis. Recommending outpatient follow-up-for procedures since recent stroke.     Eosinophilic esophagitis  Assessment & Plan  · Hx of esophageal stricture requiring dilation  · Speech consulted as patient having difficulty swallowing  · GI consulted and recommended starting fluticasone and protonix BID, also doing esophagram. Also recommending outpatient follow up and possible EGD in a few weeks after monitoring on eosinophilic esophagitis medications  · Esophagogram normal    Hyperlipidemia  Assessment & Plan  Lipid panel: cholesterol 159, ,    · Continued on home dose pravastatin     Penetrating atherosclerotic ulcer of aorta (HCC)  Assessment & Plan  Incidental finding on CTA head/neck: 2.2 cm penetrating atheromatous ulcer along lateral wall of aortic arch projecting inferolaterally   · Findings discussed with patient and family at time of admission   · Patient will need to follow-up cardiothoracic surgery outpatient basis.   · Continue with ASA and statin     Thyroid nodule  Assessment & Plan  Incidental finding on CTA head/neck: 1.7 cm peripherally calcified nodule in right thyroid lobe  · Findings discussed with patient and family on admission   · Outpatient follow-up for US     CKD (chronic kidney disease)  Assessment & Plan  Lab Results   Component Value Date    EGFR 63 08/30/2023    EGFR 52 08/29/2023    EGFR 65 02/15/2023    CREATININE 1.12 08/30/2023    CREATININE 1.31 (H) 08/29/2023    CREATININE 1.08 02/15/2023     · No documented history of CKD but baseline creatinine appears to be 1.1-1.3 per chart review   · UA reviewed and unremarkable   · Provide gentle IV fluids overnight and monitor for improvement   · Avoid nephrotoxins and hypotension   · Continue home ACE/ARB for now  · Trend BMP    Hypothyroidism  Assessment & Plan  · TSH elevated 4.97, free T4   · Continue home dose levothyroxine for now     Hypertension  Assessment & Plan  · Hypertensive urgency on arrival, now improved   · Continue home dose losartan and hospital formulary verapamil   · Monitor routinely     Type 2 diabetes mellitus with hyperglycemia, with long-term current use of insulin [facility])  Assessment & Plan  Lab Results   Component Value Date    HGBA1C 8.4 (H) 08/29/2023       Recent Labs     09/03/23  1110 09/03/23  1544 09/03/23  2115 09/04/23  7646 POCGLU 249* 265* 201* 176*       Blood Sugar Average: Last 72 hrs:  (P) 227.0245828776841377     · Poorly controlled evidenced by most recent A1c, follow-up repeat   · Home insulin regimen: Levimir 72 units twice daily + SSI with meals   · Patient blood sugar remain high, resume home dose of Levemir. Sliding scale with hypoglycemia precaution             VTE Pharmacologic Prophylaxis: VTE Score: 9 High Risk (Score >/= 5) - Pharmacological DVT Prophylaxis Ordered: enoxaparin (Lovenox). Sequential Compression Devices Ordered. Patient Centered Rounds: I performed bedside rounds with nursing staff today. Discussions with Specialists or Other Care Team Provider: none    Education and Discussions with Family / Patient: Updated  (son) via phone. Total Time Spent on Date of Encounter in care of patient: 5 minutes This time was spent on one or more of the following: performing physical exam; counseling and coordination of care; obtaining or reviewing history; documenting in the medical record; reviewing/ordering tests, medications or procedures; communicating with other healthcare professionals and discussing with patient's family/caregivers. Current Length of Stay: 6 day(s)  Current Patient Status: Inpatient   Certification Statement: The patient will continue to require additional inpatient hospital stay due to To monitor above conditions  Discharge Plan: Anticipate discharge tomorrow to rehab facility. Code Status: Level 3 - DNAR and DNI    Subjective:   Seen and evaluated and examined. Sitting upright position. Denies any significant complaint. Objective:     Vitals:   Temp (24hrs), Av.7 °F (36.5 °C), Min:97.5 °F (36.4 °C), Max:98.1 °F (36.7 °C)    Temp:  [97.5 °F (36.4 °C)-98.1 °F (36.7 °C)] 97.6 °F (36.4 °C)  HR:  [58-84] 58  Resp:  [16-20] 20  BP: (114-160)/(69-75) 160/73  SpO2:  [93 %-98 %] 98 %  Body mass index is 33.19 kg/m².      Input and Output Summary (last 24 hours): Intake/Output Summary (Last 24 hours) at 9/4/2023 0815  Last data filed at 9/4/2023 0644  Gross per 24 hour   Intake 240 ml   Output 1075 ml   Net -835 ml       Physical Exam:   Physical Exam  Vitals and nursing note reviewed. Constitutional:       Appearance: Normal appearance. He is obese. He is not ill-appearing or diaphoretic. HENT:      Nose: Nose normal.   Eyes:      General: No scleral icterus. Left eye: No discharge. Extraocular Movements: Extraocular movements intact. Conjunctiva/sclera: Conjunctivae normal.      Pupils: Pupils are equal, round, and reactive to light. Cardiovascular:      Rate and Rhythm: Normal rate. Heart sounds: Normal heart sounds. No murmur heard. No friction rub. No gallop. Pulmonary:      Effort: Pulmonary effort is normal. No respiratory distress. Breath sounds: No stridor. No wheezing or rhonchi. Abdominal:      General: Abdomen is flat. Bowel sounds are normal. There is no distension. Palpations: There is no mass. Tenderness: There is no abdominal tenderness. Hernia: No hernia is present. Musculoskeletal:      Cervical back: Normal range of motion. No rigidity. Lymphadenopathy:      Cervical: No cervical adenopathy. Skin:     Capillary Refill: Capillary refill takes less than 2 seconds. Findings: No lesion. Neurological:      General: No focal deficit present. Mental Status: He is alert and oriented to person, place, and time. Cranial Nerves: No cranial nerve deficit. Sensory: No sensory deficit. Motor: No weakness.       Coordination: Coordination normal.         Additional Data:     Labs:  Results from last 7 days   Lab Units 08/30/23  0508   WBC Thousand/uL 8.20   HEMOGLOBIN g/dL 15.5   HEMATOCRIT % 45.1   PLATELETS Thousands/uL 183     Results from last 7 days   Lab Units 08/30/23  0508   SODIUM mmol/L 135   POTASSIUM mmol/L 3.7   CHLORIDE mmol/L 104   CO2 mmol/L 24   BUN mg/dL 17 CREATININE mg/dL 1.12   ANION GAP mmol/L 7   CALCIUM mg/dL 8.6   ALBUMIN g/dL 3.5   TOTAL BILIRUBIN mg/dL 1.38*   ALK PHOS U/L 59   ALT U/L 10   AST U/L 9*   GLUCOSE RANDOM mg/dL 121     Results from last 7 days   Lab Units 08/29/23  0803   INR  0.91     Results from last 7 days   Lab Units 09/04/23  0658 09/03/23  2115 09/03/23  1544 09/03/23  1110 09/03/23  0708 09/02/23  2119 09/02/23  1644 09/02/23  1053 09/02/23  0724 09/01/23  2120 09/01/23  1645 09/01/23  1055   POC GLUCOSE mg/dl 176* 201* 265* 249* 205* 254* 229* 312* 203* 174* 243* 294*     Results from last 7 days   Lab Units 08/29/23  0803   HEMOGLOBIN A1C % 8.4*           Lines/Drains:  Invasive Devices     Peripheral Intravenous Line  Duration           Peripheral IV 08/29/23 Dorsal (posterior); Left;Proximal Forearm 6 days    Peripheral IV 09/04/23 Proximal;Right;Ventral (anterior) Forearm <1 day                      Imaging: No pertinent imaging reviewed.     Recent Cultures (last 7 days):         Last 24 Hours Medication List:   Current Facility-Administered Medications   Medication Dose Route Frequency Provider Last Rate   • aspirin  81 mg Oral Daily Rebeca Dominique PA-C     • atorvastatin  40 mg Oral Daily With Vitaliy Otero MD     • enoxaparin  40 mg Subcutaneous Daily Rebeca Dominique PA-C     • fluticasone  2 puff Inhalation Q12H [facility] Jevon Stark DO     • insulin detemir  30 Units Subcutaneous Q12H [facility] Sara Otero MD     • insulin lispro  1-6 Units Subcutaneous HS Wellington Starkey MD     • insulin lispro  2-12 Units Subcutaneous TID AC Sara Otero MD     • insulin lispro  5 Units Subcutaneous TID With Meals Hallie Dandy Islam, MD     • levothyroxine  125 mcg Oral Daily Rebeca Dominique PA-C     • losartan  50 mg Oral Daily Rebeca Dominique PA-C     • pantoprazole  40 mg Oral BID AC Lisa Garcia PA-C     • verapamil  120 mg Oral Daily Rebeca Dominique PA-C          Today, Patient Was Seen By: Wellington Starkey MD    **Please Note: This note may have been constructed using a voice recognition system. **

## 2023-09-04 NOTE — ASSESSMENT & PLAN NOTE
Lab Results   Component Value Date    HGBA1C 8.4 (H) 08/29/2023       Recent Labs     09/03/23  1110 09/03/23  1544 09/03/23  2115 09/04/23  0658   POCGLU 249* 265* 201* 176*       Blood Sugar Average: Last 72 hrs:  (P) 227.7424513330421001     · Poorly controlled evidenced by most recent A1c, follow-up repeat   · Home insulin regimen: Levimir 72 units twice daily + SSI with meals   · Patient blood sugar remain high, resume home dose of Levemir.   Sliding scale with hypoglycemia precaution

## 2023-09-05 VITALS
SYSTOLIC BLOOD PRESSURE: 138 MMHG | RESPIRATION RATE: 22 BRPM | OXYGEN SATURATION: 97 % | DIASTOLIC BLOOD PRESSURE: 77 MMHG | TEMPERATURE: 97.6 F | HEART RATE: 61 BPM | WEIGHT: 244.71 LBS | BODY MASS INDEX: 33.15 KG/M2 | HEIGHT: 72 IN

## 2023-09-05 LAB
GLUCOSE SERPL-MCNC: 206 MG/DL (ref 65–140)
GLUCOSE SERPL-MCNC: 282 MG/DL (ref 65–140)
SARS-COV-2 RNA RESP QL NAA+PROBE: NEGATIVE

## 2023-09-05 PROCEDURE — 82948 REAGENT STRIP/BLOOD GLUCOSE: CPT

## 2023-09-05 PROCEDURE — 87635 SARS-COV-2 COVID-19 AMP PRB: CPT | Performed by: FAMILY MEDICINE

## 2023-09-05 PROCEDURE — 99239 HOSP IP/OBS DSCHRG MGMT >30: CPT | Performed by: FAMILY MEDICINE

## 2023-09-05 RX ORDER — PANTOPRAZOLE SODIUM 40 MG/1
40 TABLET, DELAYED RELEASE ORAL
Refills: 0
Start: 2023-09-05

## 2023-09-05 RX ORDER — INSULIN LISPRO 100 [IU]/ML
1-6 INJECTION, SOLUTION INTRAVENOUS; SUBCUTANEOUS
Refills: 0
Start: 2023-09-05

## 2023-09-05 RX ORDER — INSULIN LISPRO 100 [IU]/ML
2-12 INJECTION, SOLUTION INTRAVENOUS; SUBCUTANEOUS
Refills: 0
Start: 2023-09-05

## 2023-09-05 RX ORDER — ATORVASTATIN CALCIUM 40 MG/1
40 TABLET, FILM COATED ORAL
Refills: 0
Start: 2023-09-05

## 2023-09-05 RX ORDER — FLUTICASONE PROPIONATE 220 UG/1
2 AEROSOL, METERED RESPIRATORY (INHALATION) EVERY 12 HOURS SCHEDULED
Qty: 12 G | Refills: 0 | Status: SHIPPED | OUTPATIENT
Start: 2023-09-05

## 2023-09-05 RX ORDER — INSULIN LISPRO 100 [IU]/ML
7 INJECTION, SOLUTION INTRAVENOUS; SUBCUTANEOUS
Refills: 0
Start: 2023-09-05

## 2023-09-05 RX ADMIN — INSULIN LISPRO 6 UNITS: 100 INJECTION, SOLUTION INTRAVENOUS; SUBCUTANEOUS at 11:15

## 2023-09-05 RX ADMIN — INSULIN DETEMIR 30 UNITS: 100 INJECTION, SOLUTION SUBCUTANEOUS at 08:04

## 2023-09-05 RX ADMIN — VERAPAMIL HYDROCHLORIDE 120 MG: 120 TABLET, FILM COATED, EXTENDED RELEASE ORAL at 08:04

## 2023-09-05 RX ADMIN — INSULIN LISPRO 4 UNITS: 100 INJECTION, SOLUTION INTRAVENOUS; SUBCUTANEOUS at 07:56

## 2023-09-05 RX ADMIN — FLUTICASONE PROPIONATE 2 PUFF: 220 AEROSOL, METERED RESPIRATORY (INHALATION) at 08:04

## 2023-09-05 RX ADMIN — INSULIN LISPRO 5 UNITS: 100 INJECTION, SOLUTION INTRAVENOUS; SUBCUTANEOUS at 07:57

## 2023-09-05 RX ADMIN — ASPIRIN 81 MG: 81 TABLET, COATED ORAL at 08:04

## 2023-09-05 RX ADMIN — PANTOPRAZOLE SODIUM 40 MG: 40 TABLET, DELAYED RELEASE ORAL at 07:58

## 2023-09-05 RX ADMIN — LOSARTAN POTASSIUM 50 MG: 50 TABLET, FILM COATED ORAL at 08:04

## 2023-09-05 RX ADMIN — LEVOTHYROXINE SODIUM 125 MCG: 125 TABLET ORAL at 08:04

## 2023-09-05 RX ADMIN — INSULIN LISPRO 5 UNITS: 100 INJECTION, SOLUTION INTRAVENOUS; SUBCUTANEOUS at 11:15

## 2023-09-05 NOTE — DISCHARGE SUMMARY
[address] [facility]  Discharge- Kathryn Borden 1945, 68 y.o. male MRN: [id number]  Unit/Bed#: -01 Encounter: [id number]  Primary Care Provider: Danny Quiñonez MD   Date and time admitted to hospital: 8/29/2023  7:46 AM    * Acute CVA (cerebrovascular accident) [facility])  Assessment & Plan  Presented with 5-7 days history of  slurred speech. Admitted to stroke pathway. 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 · CTA head/neck: Negative for large vessel occlusion, dissection, or aneurysm. 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 · Status post 325 mg ASA on arrival, continue with 81 mg daily and home dose satin   · MRI brain: Small acute juxtacortical white matter infarct within the right posterior frontal lobe, without mass effect or hemorrhagic transformation. Advanced chronic microangiopathic change elsewhere within the brain parenchyma. Chronic microhemorrhages within the left temporal occipital junction and left posterior medial cerebellum. No acute hemorrhage. · Echocardiogram normal  · , TSH normal  · A1c 8.4. improved from 9.4  · Neurology consulted and recommending . 2D echo shows EF of 70%. Technically difficult study.   Will need outpatient follow-up with cardiology for loop recorder  · PT/OT recommending acute rehab    Eosinophilic esophagitis  Assessment & Plan  · Hx of esophageal stricture requiring dilation  · Speech consulted as patient having difficulty swallowing  · GI consulted and recommended starting fluticasone and protonix BID, also doing esophagram. Also recommending outpatient follow up and possible EGD in a few weeks after monitoring on eosinophilic esophagitis medications  · Esophagogram normal    Hypertensive urgency  Assessment & Plan  · Hypertensive urgency on arrival, now improved   · Continue home dose losartan and hospital formulary verapamil     Penetrating atherosclerotic ulcer of aorta ([address])  Assessment & Plan  Incidental finding on CTA head/neck: 2.2 cm penetrating atheromatous ulcer along lateral wall of aortic arch projecting inferolaterally   · Findings discussed with patient and family at time of admission   · Patient will need to follow-up vascular surgery outpatient basis.   · Continue with ASA and statin     Thyroid nodule  Assessment & Plan  Incidental finding on CTA head/neck: 1.7 cm peripherally calcified nodule in right thyroid lobe  · Findings discussed with patient and family on admission   · Outpatient follow-up for US     CKD (chronic kidney disease)  Assessment & Plan  Lab Results   Component Value Date    EGFR 63 08/30/2023    EGFR 52 08/29/2023    EGFR 65 02/15/2023    CREATININE 1.12 08/30/2023    CREATININE 1.31 (H) 08/29/2023    CREATININE 1.08 02/15/2023     · No documented history of CKD but baseline creatinine appears to be 1.1-1.3 per chart review   · UA reviewed and unremarkable   · Now back to baseline after hydration    Acquired hypothyroidism  Assessment & Plan  · TSH elevated 4.97, free T4 normal.  · Continue home dose levothyroxine for now     Type 2 diabetes mellitus with hyperglycemia, with long-term current use of insulin [facility])  Assessment & Plan  Lab Results   Component Value Date    HGBA1C 8.4 (H) 08/29/2023       Recent Labs     09/04/23  1139 09/04/23  1604 09/04/23  2101 09/05/23  0755   POCGLU 270* 312* 263* 206*       Blood Sugar Average: Last 72 hrs:  (P) 241.6293297247218192     · Poorly controlled evidenced by most recent A1c, follow-up repeat   · Home insulin regimen: Levemir 72 units twice daily + SSI with meals     Discharging Physician / Practitioner: Faustina Haro MD  PCP: Pio Mcgovern MD  Admission Date:   Admission Orders (From admission, onward)     Ordered        08/29/23 0937  INPATIENT ADMISSION  Once                      Discharge Date: 09/05/23    Medical Problems     Resolved Problems  Date Reviewed: 9/5/2023 Resolved    Hyponatremia 8/31/2023     Resolved by  Sp Eduardo PA-C          Consultations During Hospital Stay:  · Neuro,gi    Procedures Performed:   · none    Significant Findings / Test Results:   FL barium swallow ROUTINE esophagus    Result Date: 8/31/2023  Impression: Unremarkable esophagram. No stricture or mucosal lesion identified. The oral bolus did clear poorly. Workstation performed: [id number]     MRI brain wo contrast    Result Date: 8/29/2023  Impression: Small acute juxtacortical white matter infarct within the right posterior frontal lobe, series 3 image 20 without mass effect or hemorrhagic transformation. Advanced chronic microangiopathic change elsewhere within the brain parenchyma. Chronic microhemorrhages within the left temporal occipital junction and left posterior medial cerebellum. No acute hemorrhage. This examination was marked "immediate notification" in Epic in order to begin the standard process by which the radiology reading room liaison alerts the referring practitioner. Workstation performed: [id number]     CTA head and neck with and without contrast    Result Date: 8/29/2023  Impression: No acute intracranial abnormality. If continued concern for acute infarct, consider follow-up MRI brain without contrast. Chronic lacunar infarcts in right basal ganglia and left cerebellum with severe chronic microangiopathy. Negative CTA head and neck for large vessel occlusion, dissection, or aneurysm. 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 Recommend vascular surgery follow-up. 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 Incidental thyroid nodule(s) for which nonemergent thyroid ultrasound is recommended. Additional chronic/incidental findings as detailed above. The study was marked in [facility] for immediate notification.  Workstation performed: [id number]     Incidental Findings:   · none    Test Results Pending at Discharge (will require follow up):   · none     Outpatient Tests Requested:  · Outpatient follow up with cardiology for loop recorder  · Outpatient follow up with vascular surgery for . 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 Complications:  none    Reason for Admission: Dysarthria    Hospital Course: Alm Krabbe is a 68 y.o. male patient who originally presented to the hospital on 8/29/2023 due to dysarthria. Patient placed on stroke pathway and MRI brain was positive for small acute juxtacortical white matter infarct within the right posterior frontal lobe. Patient received aspirin 325 mg on arrival and continue aspirin 81 mg daily and statin therapy. No known history of A-fib and no evidence of A-fib noted on monitor here so far. Unable to visualize PFO on patient 2D echo . however will need loop recorder and also outpatient follow-up with vascular surgery due to 2.2 cm penetrating atheromatous ulcer along the lateral wall of the aortic arch. Patient was seen by physical therapy and Occupational Therapy and recommended acute rehab where he will be transferred today        Please see above list of diagnoses and related plan for additional information. Condition at Discharge: good     Discharge Day Visit / Exam:     Subjective: Patient denies any chest pain or shortness of breath looking forward to going to rehab today  Vitals: Blood Pressure: 138/77 (09/05/23 0755)  Pulse: 61 (09/05/23 0755)  Temperature: 97.6 °F (36.4 °C) (09/05/23 0755)  Temp Source: Temporal (09/05/23 0755)  Respirations: 22 (09/05/23 0755)  Height: 6' (182.9 cm) (08/31/23 1332)  Weight - Scale: 111 kg (244 lb 11.4 oz) (08/31/23 1332)  SpO2: 97 % (09/05/23 0755)  Exam:   Physical Exam  Vitals and nursing note reviewed. Constitutional:       Appearance: Normal appearance. HENT:      Head: Normocephalic and atraumatic.       Right Ear: External ear normal.      Left Ear: External ear normal. Nose: Nose normal.      Mouth/Throat:      Pharynx: Oropharynx is clear. Cardiovascular:      Rate and Rhythm: Normal rate and regular rhythm. Heart sounds: Normal heart sounds. Pulmonary:      Effort: Pulmonary effort is normal.      Breath sounds: Normal breath sounds. Abdominal:      General: Bowel sounds are normal.      Palpations: Abdomen is soft. Tenderness: There is no abdominal tenderness. Musculoskeletal:         General: Normal range of motion. Cervical back: Normal range of motion and neck supple. Skin:     General: Skin is warm and dry. Capillary Refill: Capillary refill takes less than 2 seconds. Neurological:      Mental Status: He is alert and oriented to person, place, and time. Mental status is at baseline. Psychiatric:         Mood and Affect: Mood normal.         Discussion with Family: We will update family    Discharge instructions/Information to patient and family:   See after visit summary for information provided to patient and family. Provisions for Follow-Up Care:  See after visit summary for information related to follow-up care and any pertinent home health orders. Disposition:     [facility]    For Discharges to [facility]F:   · Not Applicable to this Patient - Not Applicable to this Patient    Planned Readmission: none     Discharge Statement:  I spent 35 minutes discharging the patient. This time was spent on the day of discharge. I had direct contact with the patient on the day of discharge. Greater than 50% of the total time was spent examining patient, answering all patient questions, arranging and discussing plan of care with patient as well as directly providing post-discharge instructions. Additional time then spent on discharge activities. Discharge Medications:  See after visit summary for reconciled discharge medications provided to patient and family.       ** Please Note: This note has been constructed using a voice recognition system **

## 2023-09-05 NOTE — CASE MANAGEMENT
[facility] has received approved authorization from insurance: Leslee Bueno in by Rep: Vikas TAVERAS#  [phone]  Authorization received for: [address] [place]: [address] [place] [address] #: [id number]   Start of Care: 09/05/2023  Next Review Date: 09/11/2023  Submit next review to: Fax. [phone]   Care Manager notified: Neelam Sumner

## 2023-09-05 NOTE — ASSESSMENT & PLAN NOTE
Presented with 5-7 days history of  slurred speech. Admitted to stroke pathway. 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 · CTA head/neck: Negative for large vessel occlusion, dissection, or aneurysm. 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 · Status post 325 mg ASA on arrival, continue with 81 mg daily and home dose satin   · MRI brain: Small acute juxtacortical white matter infarct within the right posterior frontal lobe, without mass effect or hemorrhagic transformation. Advanced chronic microangiopathic change elsewhere within the brain parenchyma. Chronic microhemorrhages within the left temporal occipital junction and left posterior medial cerebellum. No acute hemorrhage. · Echocardiogram normal  · , TSH normal  · A1c 8.4. improved from 9.4  · Neurology consulted and recommending . 2D echo shows EF of 70%. Technically difficult study.   Will need outpatient follow-up with cardiology for loop recorder  · PT/OT recommending acute rehab

## 2023-09-05 NOTE — ASSESSMENT & PLAN NOTE
Lab Results   Component Value Date    EGFR 63 08/30/2023    EGFR 52 08/29/2023    EGFR 65 02/15/2023    CREATININE 1.12 08/30/2023    CREATININE 1.31 (H) 08/29/2023    CREATININE 1.08 02/15/2023     · No documented history of CKD but baseline creatinine appears to be 1.1-1.3 per chart review   · UA reviewed and unremarkable   · Now back to baseline after hydration

## 2023-09-05 NOTE — PLAN OF CARE
Problem: MOBILITY - ADULT  Goal: Maintain or return to baseline ADL function  Description: INTERVENTIONS:  -  Assess patient's ability to carry out ADLs; assess patient's baseline for ADL function and identify physical deficits which impact ability to perform ADLs (bathing, care of mouth/teeth, toileting, grooming, dressing, etc.)  - Assess/evaluate cause of self-care deficits   - Assess range of motion  - Assess patient's mobility; develop plan if impaired  - Assess patient's need for assistive devices and provide as appropriate  - Encourage maximum independence but intervene and supervise when necessary  - Involve family in performance of ADLs  - Assess for home care needs following discharge   - Consider OT consult to assist with ADL evaluation and planning for discharge  - Provide patient education as appropriate  Outcome: Progressing  Goal: Maintains/Returns to pre admission functional level  Description: INTERVENTIONS:  - Perform BMAT or MOVE assessment daily.   - Set and communicate daily mobility goal to care team and patient/family/caregiver. - Collaborate with rehabilitation services on mobility goals if consulted  - Perform Range of Motion 2 times a day. - Reposition patient every 2 hours.   - Dangle patient 2 times a day  - Stand patient 2 times a day  - Ambulate patient 2 times a day  - Out of bed to chair 2 times a day   - Out of bed for meals 2 times a day  - Out of bed for toileting  - Record patient progress and toleration of activity level   Outcome: Progressing     Problem: PAIN - ADULT  Goal: Verbalizes/displays adequate comfort level or baseline comfort level  Description: Interventions:  - Encourage patient to monitor pain and request assistance  - Assess pain using appropriate pain scale  - Administer analgesics based on type and severity of pain and evaluate response  - Implement non-pharmacological measures as appropriate and evaluate response  - Consider cultural and social influences on pain and pain management  - Notify physician/advanced practitioner if interventions unsuccessful or patient reports new pain  Outcome: Progressing     Problem: INFECTION - ADULT  Goal: Absence or prevention of progression during hospitalization  Description: INTERVENTIONS:  - Assess and monitor for signs and symptoms of infection  - Monitor lab/diagnostic results  - Monitor all insertion sites, i.e. indwelling lines, tubes, and drains  - Monitor endotracheal if appropriate and nasal secretions for changes in amount and color  - [place] appropriate cooling/warming therapies per order  - Administer medications as ordered  - Instruct and encourage patient and family to use good hand hygiene technique  - Identify and instruct in appropriate isolation precautions for identified infection/condition  Outcome: Progressing  Goal: Absence of fever/infection during neutropenic period  Description: INTERVENTIONS:  - Monitor WBC    Outcome: Progressing     Problem: SAFETY ADULT  Goal: Maintain or return to baseline ADL function  Description: INTERVENTIONS:  -  Assess patient's ability to carry out ADLs; assess patient's baseline for ADL function and identify physical deficits which impact ability to perform ADLs (bathing, care of mouth/teeth, toileting, grooming, dressing, etc.)  - Assess/evaluate cause of self-care deficits   - Assess range of motion  - Assess patient's mobility; develop plan if impaired  - Assess patient's need for assistive devices and provide as appropriate  - Encourage maximum independence but intervene and supervise when necessary  - Involve family in performance of ADLs  - Assess for home care needs following discharge   - Consider OT consult to assist with ADL evaluation and planning for discharge  - Provide patient education as appropriate  Outcome: Progressing  Goal: Maintains/Returns to pre admission functional level  Description: INTERVENTIONS:  - Perform BMAT or MOVE assessment daily.   - Set and communicate daily mobility goal to care team and patient/family/caregiver. - Collaborate with rehabilitation services on mobility goals if consulted  - Perform Range of Motion 2 times a day. - Reposition patient every 2 hours.   - Dangle patient 2 times a day  - Stand patient 2 times a day  - Ambulate patient 2 times a day  - Out of bed to chair 2 times a day   - Out of bed for meals 2 times a day  - Out of bed for toileting  - Record patient progress and toleration of activity level   Outcome: Progressing  Goal: Patient will remain free of falls  Description: INTERVENTIONS:  - Educate patient/family on patient safety including physical limitations  - Instruct patient to call for assistance with activity   - Consult OT/PT to assist with strengthening/mobility   - Keep Call bell within reach  - Keep bed low and locked with side rails adjusted as appropriate  - Keep care items and personal belongings within reach  - Initiate and maintain comfort rounds  - Make Fall Risk Sign visible to staff  - Offer Toileting every 2 Hours, in advance of need  - Initiate/Maintain bed/chair alarm  - Obtain necessary fall risk management equipment: na  - Apply yellow socks and bracelet for high fall risk patients  - Consider moving patient to room near nurses station  Outcome: Progressing     Problem: DISCHARGE PLANNING  Goal: Discharge to home or other facility with appropriate resources  Description: INTERVENTIONS:  - Identify barriers to discharge w/patient and caregiver  - Arrange for needed discharge resources and transportation as appropriate  - Identify discharge learning needs (meds, wound care, etc.)  - Arrange for interpretive services to assist at discharge as needed  - Refer to Case Management Department for coordinating discharge planning if the patient needs post-hospital services based on physician/advanced practitioner order or complex needs related to functional status, cognitive ability, or social support system  Outcome: Progressing     Problem: Knowledge Deficit  Goal: Patient/family/caregiver demonstrates understanding of disease process, treatment plan, medications, and discharge instructions  Description: Complete learning assessment and assess knowledge base.   Interventions:  - Provide teaching at level of understanding  - Provide teaching via preferred learning methods  Outcome: Progressing

## 2023-09-05 NOTE — CASE MANAGEMENT
Case Management Discharge Planning Note    Patient name Blanco Le  Location /-94 MRN [id number]  : 1945 Date 2023       Current Admission Date: 2023  Current Admission Diagnosis:Acute CVA (cerebrovascular accident) [facility])   Patient Active Problem List    Diagnosis Date Noted   • Eosinophilic esophagitis    • Acute CVA (cerebrovascular accident) ([address]) 2023   • Acquired hypothyroidism 2023   • CKD (chronic kidney disease) 2023   • Thyroid nodule 2023   • Penetrating atherosclerotic ulcer of aorta ([address]) 2023   • Hyperlipidemia 2023   • Foreign body sensation in throat 2023   • Type 2 diabetes mellitus with hyperglycemia, with long-term current use of insulin ([address])    • Hypertensive urgency       LOS (days): 7  Geometric Mean LOS (GMLOS) (days): 2.90  Days to GMLOS:-4.2     OBJECTIVE:  Risk of Unplanned Readmission Score: 11.7         Current admission status: Inpatient   Preferred Pharmacy:   [id number] - [address] [address]  Phone: [phone] Fax: [phone]    Primary Care Provider: Deborah Miner MD    Primary Insurance: Monty Luis [facility] RE[facility]  Secondary Insurance:     DISCHARGE DETAILS:                              Pt is medically stable for discharge.   CM placing transportation request to [facility] in [address]        as per [facility] will  pt at 1400 to transport to [address] [place] [address] as per pt, his niece will transport him to [email or web address], transportation cancelled                                                           [address] [place] [address] [address] Name, [facility] : [employer]  Receiving Facility/Agency Phone Number: [phone]  Facility/Agency Fax Number: [phone]

## 2023-09-05 NOTE — ASSESSMENT & PLAN NOTE
Lab Results   Component Value Date    HGBA1C 8.4 (H) 08/29/2023       Recent Labs     09/04/23  1139 09/04/23  1604 09/04/23  2101 09/05/23  0755   POCGLU 270* 312* 263* 206*       Blood Sugar Average: Last 72 hrs:  (P) [id number]     · Poorly controlled evidenced by most recent A1c, follow-up repeat   · Home insulin regimen: Levemir 72 units twice daily + SSI with meals

## 2023-09-05 NOTE — ASSESSMENT & PLAN NOTE
Incidental finding on CTA head/neck: 2.2 cm penetrating atheromatous ulcer along lateral wall of aortic arch projecting inferolaterally   · Findings discussed with patient and family at time of admission   · Patient will need to follow-up vascular surgery outpatient basis.   · Continue with ASA and statin

## 2023-09-05 NOTE — ASSESSMENT & PLAN NOTE
· Hypertensive urgency on arrival, now improved   · Continue home dose losartan and hospital formulary verapamil

## 2023-09-05 NOTE — PLAN OF CARE
Pt medically stable for d/c. Awaiting insurance auth to d/c to acute rehab. Will continue to monitor and support as needed. Problem: MOBILITY - ADULT  Goal: Maintain or return to baseline ADL function  Description: INTERVENTIONS:  -  Assess patient's ability to carry out ADLs; assess patient's baseline for ADL function and identify physical deficits which impact ability to perform ADLs (bathing, care of mouth/teeth, toileting, grooming, dressing, etc.)  - Assess/evaluate cause of self-care deficits   - Assess range of motion  - Assess patient's mobility; develop plan if impaired  - Assess patient's need for assistive devices and provide as appropriate  - Encourage maximum independence but intervene and supervise when necessary  - Involve family in performance of ADLs  - Assess for home care needs following discharge   - Consider OT consult to assist with ADL evaluation and planning for discharge  - Provide patient education as appropriate  Outcome: Adequate for Discharge  Goal: Maintains/Returns to pre admission functional level  Description: INTERVENTIONS:  - Perform BMAT or MOVE assessment daily.   - Set and communicate daily mobility goal to care team and patient/family/caregiver. - Collaborate with rehabilitation services on mobility goals if consulted  - Perform Range of Motion 2 times a day. - Reposition patient every 2 hours.   - Dangle patient 2 times a day  - Stand patient 2 times a day  - Ambulate patient 2 times a day  - Out of bed to chair 2 times a day   - Out of bed for meals 2 times a day  - Out of bed for toileting  - Record patient progress and toleration of activity level   Outcome: Adequate for Discharge     Problem: PAIN - ADULT  Goal: Verbalizes/displays adequate comfort level or baseline comfort level  Description: Interventions:  - Encourage patient to monitor pain and request assistance  - Assess pain using appropriate pain scale  - Administer analgesics based on type and severity of pain and evaluate response  - Implement non-pharmacological measures as appropriate and evaluate response  - Consider cultural and social influences on pain and pain management  - Notify physician/advanced practitioner if interventions unsuccessful or patient reports new pain  Outcome: Adequate for Discharge     Problem: INFECTION - ADULT  Goal: Absence or prevention of progression during hospitalization  Description: INTERVENTIONS:  - Assess and monitor for signs and symptoms of infection  - Monitor lab/diagnostic results  - Monitor all insertion sites, i.e. indwelling lines, tubes, and drains  - Monitor endotracheal if appropriate and nasal secretions for changes in amount and color  - [place] appropriate cooling/warming therapies per order  - Administer medications as ordered  - Instruct and encourage patient and family to use good hand hygiene technique  - Identify and instruct in appropriate isolation precautions for identified infection/condition  Outcome: Adequate for Discharge  Goal: Absence of fever/infection during neutropenic period  Description: INTERVENTIONS:  - Monitor WBC    Outcome: Adequate for Discharge     Problem: SAFETY ADULT  Goal: Maintain or return to baseline ADL function  Description: INTERVENTIONS:  -  Assess patient's ability to carry out ADLs; assess patient's baseline for ADL function and identify physical deficits which impact ability to perform ADLs (bathing, care of mouth/teeth, toileting, grooming, dressing, etc.)  - Assess/evaluate cause of self-care deficits   - Assess range of motion  - Assess patient's mobility; develop plan if impaired  - Assess patient's need for assistive devices and provide as appropriate  - Encourage maximum independence but intervene and supervise when necessary  - Involve family in performance of ADLs  - Assess for home care needs following discharge   - Consider OT consult to assist with ADL evaluation and planning for discharge  - Provide patient education as appropriate  Outcome: Adequate for Discharge  Goal: Maintains/Returns to pre admission functional level  Description: INTERVENTIONS:  - Perform BMAT or MOVE assessment daily.   - Set and communicate daily mobility goal to care team and patient/family/caregiver. - Collaborate with rehabilitation services on mobility goals if consulted  - Perform Range of Motion 2 times a day. - Reposition patient every 2 hours.   - Dangle patient 2 times a day  - Stand patient 2 times a day  - Ambulate patient 2 times a day  - Out of bed to chair 2 times a day   - Out of bed for meals 2 times a day  - Out of bed for toileting  - Record patient progress and toleration of activity level   Outcome: Adequate for Discharge  Goal: Patient will remain free of falls  Description: INTERVENTIONS:  - Educate patient/family on patient safety including physical limitations  - Instruct patient to call for assistance with activity   - Consult OT/PT to assist with strengthening/mobility   - Keep Call bell within reach  - Keep bed low and locked with side rails adjusted as appropriate  - Keep care items and personal belongings within reach  - Initiate and maintain comfort rounds  - Make Fall Risk Sign visible to staff  - Offer Toileting every 2 Hours, in advance of need  - Initiate/Maintain bed/chair alarm  - Obtain necessary fall risk management equipment: na  - Apply yellow socks and bracelet for high fall risk patients  - Consider moving patient to room near nurses station  Outcome: Adequate for Discharge     Problem: DISCHARGE PLANNING  Goal: Discharge to home or other facility with appropriate resources  Description: INTERVENTIONS:  - Identify barriers to discharge w/patient and caregiver  - Arrange for needed discharge resources and transportation as appropriate  - Identify discharge learning needs (meds, wound care, etc.)  - Arrange for interpretive services to assist at discharge as needed  - Refer to Case Management Department for coordinating discharge planning if the patient needs post-hospital services based on physician/advanced practitioner order or complex needs related to functional status, cognitive ability, or social support system  Outcome: Adequate for Discharge     Problem: Knowledge Deficit  Goal: Patient/family/caregiver demonstrates understanding of disease process, treatment plan, medications, and discharge instructions  Description: Complete learning assessment and assess knowledge base. Interventions:  - Provide teaching at level of understanding  - Provide teaching via preferred learning methods  Outcome: Adequate for Discharge     Problem: Neurological Deficit  Goal: Neurological status is stable or improving  Description: Interventions:  - Monitor and assess patient's level of consciousness, motor function, sensory function, and level of assistance needed for ADLs. - Monitor and report changes from baseline. Collaborate with interdisciplinary team to initiate plan and implement interventions as ordered. - Provide and maintain a safe environment. - Consider seizure precautions. - Consider fall precautions. - Consider aspiration precautions. - Consider bleeding precautions. Outcome: Adequate for Discharge     Problem: Activity Intolerance/Impaired Mobility  Goal: Mobility/activity is maintained at optimum level for patient  Description: Interventions:  - Assess and monitor patient  barriers to mobility and need for assistive/adaptive devices. - Assess patient's emotional response to limitations. - Collaborate with interdisciplinary team and initiate plans and interventions as ordered. - Encourage independent activity per ability.  - Maintain proper body alignment. - Perform active/passive rom as tolerated/ordered.   - Plan activities to conserve energy.  - Turn patient as appropriate  Outcome: Adequate for Discharge     Problem: Communication Impairment  Goal: Ability to express needs and understand communication  Description: Assess patient's communication skills and ability to understand information. Patient will demonstrate use of effective communication techniques, alternative methods of communication and understanding even if not able to speak. - Encourage communication and provide alternate methods of communication as needed. - Collaborate with case management/ for discharge needs. - Include patient/family/caregiver in decisions related to communication. Outcome: Adequate for Discharge     Problem: Potential for Aspiration  Goal: Non-ventilated patient's risk of aspiration is minimized  Description: Assess and monitor vital signs, respiratory status, and labs (WBC). Monitor for signs of aspiration (tachypnea, cough, rales, wheezing, cyanosis, fever). - Assess and monitor patient's ability to swallow. - Place patient up in chair to eat if possible. - HOB up at 90 degrees to eat if unable to get patient up into chair.  - Supervise patient during oral intake. - Instruct patient/ family to take small bites. - Instruct patient/ family to take small single sips when taking liquids. - Follow patient-specific strategies generated by speech pathologist.  Outcome: Adequate for Discharge  Goal: Ventilated patient's risk of aspiration is minimized  Description: Assess and monitor vital signs, respiratory status, airway cuff pressure, and labs (WBC). Monitor for signs of aspiration (tachypnea, cough, rales, wheezing, cyanosis, fever). - Elevate head of bed 30 degrees if patient has tube feeding.  - Monitor tube feeding. Outcome: Adequate for Discharge     Problem: Nutrition  Goal: Nutrition/Hydration status is improving  Description: Monitor and assess patient's nutrition/hydration status for malnutrition (ex- brittle hair, bruises, dry skin, pale skin and conjunctiva, muscle wasting, smooth red tongue, and disorientation).  Collaborate with interdisciplinary team and initiate plan and interventions as ordered. Monitor patient's weight and dietary intake as ordered or per policy. Utilize nutrition screening tool and intervene per policy. Determine patient's food preferences and provide high-protein, high-caloric foods as appropriate. - Assist patient with eating.  - Allow adequate time for meals.  - Encourage patient to take dietary supplement as ordered. - Collaborate with clinical nutritionist.  - Include patient/family/caregiver in decisions related to nutrition.   Outcome: Adequate for Discharge     Problem: Prexisting or High Potential for Compromised Skin Integrity  Goal: Skin integrity is maintained or improved  Description: INTERVENTIONS:  - Identify patients at risk for skin breakdown  - Assess and monitor skin integrity  - Assess and monitor nutrition and hydration status  - Monitor labs   - Assess for incontinence   - Turn and reposition patient  - Assist with mobility/ambulation  - Relieve pressure over bony prominences  - Avoid friction and shearing  - Provide appropriate hygiene as needed including keeping skin clean and dry  - Evaluate need for skin moisturizer/barrier cream  - Collaborate with interdisciplinary team   - Patient/family teaching  - Consider wound care consult   Outcome: Adequate for Discharge

## 2023-09-05 NOTE — CASE MANAGEMENT
Case Management Discharge Planning Note    Patient name Lindsey Linn  Location /-00 MRN [id number]  : 1945 Date 2023       Current Admission Date: 2023  Current Admission Diagnosis:Acute CVA (cerebrovascular accident) [facility])   Patient Active Problem List    Diagnosis Date Noted   • Eosinophilic esophagitis    • Acute CVA (cerebrovascular accident) ([address]) 2023   • Hypothyroidism 2023   • CKD (chronic kidney disease) 2023   • Thyroid nodule 2023   • Penetrating atherosclerotic ulcer of aorta ([address]) 2023   • Hyperlipidemia 2023   • Foreign body sensation in throat 2023   • Type 2 diabetes mellitus with hyperglycemia, with long-term current use of insulin ([address])    • Hypertension       LOS (days): 7  Geometric Mean LOS (GMLOS) (days): 2.90  Days to GMLOS:-4.2     OBJECTIVE:  Risk of Unplanned Readmission Score: 11.7         Current admission status: Inpatient   Preferred Pharmacy:   [facility] - [address] [place] [address] [address] [place] [address]  Phone: [phone] Fax: [phone]    Primary Care Provider: Raisa Payne MD    Primary Insurance: Jeanne Killian [facility]  Secondary Insurance:     [address] Number: [id number]

## 2023-09-05 NOTE — NURSING NOTE
Pt dressed and ready for discharge. Paperwork for facility sent with pt. Taken via w/c to discharge with family. [facility] called with update and pt arriving via Private vehicle.

## 2023-09-05 NOTE — CASE MANAGEMENT
Authorization request still marked as pending according to [employer]. [employer]  notified and [employer] contacted. CM notified.

## 2023-09-06 NOTE — UTILIZATION REVIEW
NOTIFICATION OF ADMISSION DISCHARGE   This is a Notification of Discharge from [facility]. Please be advised that this patient has been discharge from our facility. Below you will find the admission and discharge date and time including the patient’s disposition. UTILIZATION REVIEW CONTACT:  Evan Alcantara  Utilization   Network Utilization Review Department  Phone: [phone] x carefully listen to the prompts. All voicemails are confidential.  Email: [email or web address]     ADMISSION INFORMATION  PRESENTATION DATE: 8/29/2023  7:46 AM  OBERVATION ADMISSION DATE:   INPATIENT ADMISSION DATE: 8/29/23  9:37 AM   DISCHARGE DATE: 9/5/2023  2:20 PM   [facility]    IMPORTANT INFORMATION:  Send all requests for admission clinical reviews, approved or denied determinations and any other requests to dedicated fax number below belonging to the campus where the patient is receiving treatment.  List of dedicated fax numbers:  [place] DENIALS (Administrative/Medical Necessity) [phone] [facility] [phone]   [place] [facility] [phone]   [facility] [phone] [address] [phone]   [address] [place] [address] [phone]   [facility] [phone]   [address] [facility] [phone]   [address] [facility] [phone]   [address] [place] [address] [phone] [address] [facility] [address] [phone]

## 2023-11-10 ENCOUNTER — TELEPHONE (OUTPATIENT)
Dept: NEUROLOGY | Facility: CLINIC | Age: 78
End: 2023-11-10

## 2023-11-27 ENCOUNTER — TELEPHONE (OUTPATIENT)
Dept: CARDIAC SURGERY | Facility: CLINIC | Age: 78
End: 2023-11-27

## 2023-11-27 NOTE — TELEPHONE ENCOUNTER
Tried calling patient to f/up phone # in chart not in service, tried his mobile # and was told I had the wrong #. Patient will need a TEVAR consult w. Dr. Dash Lundberg.

## 2023-12-07 ENCOUNTER — APPOINTMENT (EMERGENCY)
Dept: CT IMAGING | Facility: HOSPITAL | Age: 78
End: 2023-12-07
Payer: COMMERCIAL

## 2023-12-07 ENCOUNTER — HOSPITAL ENCOUNTER (EMERGENCY)
Facility: HOSPITAL | Age: 78
Discharge: HOME | End: 2023-12-08
Attending: EMERGENCY MEDICINE
Payer: COMMERCIAL

## 2023-12-07 VITALS
DIASTOLIC BLOOD PRESSURE: 79 MMHG | WEIGHT: 258.16 LBS | OXYGEN SATURATION: 97 % | SYSTOLIC BLOOD PRESSURE: 169 MMHG | HEART RATE: 66 BPM | TEMPERATURE: 97.9 F | HEIGHT: 72 IN | BODY MASS INDEX: 34.97 KG/M2 | RESPIRATION RATE: 16 BRPM

## 2023-12-07 DIAGNOSIS — S09.90XA INJURY OF HEAD, INITIAL ENCOUNTER: Primary | ICD-10-CM

## 2023-12-07 PROCEDURE — 99284 EMERGENCY DEPT VISIT MOD MDM: CPT

## 2023-12-07 PROCEDURE — 90471 IMMUNIZATION ADMIN: CPT

## 2023-12-07 PROCEDURE — 70450 CT HEAD/BRAIN W/O DYE: CPT

## 2023-12-07 PROCEDURE — 72125 CT NECK SPINE W/O DYE: CPT

## 2023-12-07 PROCEDURE — 90715 TDAP VACCINE 7 YRS/> IM: CPT | Performed by: EMERGENCY MEDICINE

## 2023-12-07 PROCEDURE — 99284 EMERGENCY DEPT VISIT MOD MDM: CPT | Performed by: EMERGENCY MEDICINE

## 2023-12-07 RX ADMIN — TETANUS TOXOID, REDUCED DIPHTHERIA TOXOID AND ACELLULAR PERTUSSIS VACCINE, ADSORBED 0.5 ML: 5; 2.5; 8; 8; 2.5 SUSPENSION INTRAMUSCULAR at 23:09

## 2023-12-08 NOTE — DISCHARGE INSTRUCTIONS
The CT scans performed today in the emergency department were negative. Please keep the abrasions and their dressings clean and dry.   You may change the dressings daily at home with dry gauze and over-the-counter bacitracin ointment or other similar antibacterial ointment  You received a dose of tetanus vaccination today in the emergency room

## 2023-12-08 NOTE — ED PROVIDER NOTES
Emergency Department Trauma Note  Fermin Pond 66 y.o. male MRN: [id number]  Unit/Bed#: ED 12/ED 12 Encounter: [id number]      Trauma Alert: Trauma Acuity: Trauma Evaluation  Model of Arrival: Mode of Arrival: BLS via    Trauma Team: Current Providers  Attending Provider: Magaly Olguin MD  Registered Nurse: Tamiko Melissa RN  Consultants:     None      History of Present Illness     Chief Complaint:   Chief Complaint   Patient presents with    Fall     Pt tripped at home, hit his head off of his washer. -loc, +aspirin. Denies any pain, multiple abrasions noted to arms and head. HPI:  Fermin Pond is a 66 y.o. male who presents with fall, head strike. Mechanism:Details of Incident: pt tripped in his laundry room and hit his head off of his washer. -loc, +aspirin Injury Date: 12/07/23 Injury Time: 2224 Injury Occurence Location - [address] [place] [address]: [facility]    Patient states he slipped/tripped in his laundry room, fell and hit his head. Did not pass out, did not throw up. No complaints at this time.       History provided by:  Patient   used: No    Fall  Mechanism of injury: fall    Injury location:  Head/neck and shoulder/arm  Time since incident:  45 minutes  Fall:     Fall occurred:  Standing    Impact surface:  Hard floor    Entrapped after fall: no    Suspicion of alcohol use: no    Suspicion of drug use: no    Tetanus status:  Unknown  Prior to arrival data:     Bystander interventions:  None    Patient ambulatory at scene: yes      Blood loss:  None    Responsiveness at scene:  Alert    Orientation at scene:  Person, place, situation and time    Loss of consciousness: no      Amnesic to event: no      Airway interventions:  None    Immobilization:  C-collar    Airway condition since incident:  Stable    Breathing condition since incident:  Stable    Circulation condition since incident:  Stable    Mental status condition since incident:  Stable    Disability condition since incident:  Stable  Associated symptoms: no abdominal pain, no back pain, no chest pain, no headaches, no hearing loss, no loss of consciousness, no nausea, no neck pain, no seizures and no vomiting    Risk factors: anticoagulation therapy      Review of Systems   Constitutional:  Negative for chills and fever. HENT:  Negative for ear pain, hearing loss, sore throat, trouble swallowing and voice change. Eyes:  Negative for pain and discharge. Respiratory:  Negative for cough, shortness of breath and wheezing. Cardiovascular:  Negative for chest pain and palpitations. Gastrointestinal:  Negative for abdominal pain, blood in stool, constipation, diarrhea, nausea and vomiting. Genitourinary:  Negative for dysuria, flank pain, frequency and hematuria. Musculoskeletal:  Negative for back pain, joint swelling, neck pain and neck stiffness. Skin:  Negative for rash and wound. Neurological:  Negative for dizziness, seizures, loss of consciousness, syncope, facial asymmetry and headaches. Psychiatric/Behavioral:  Negative for hallucinations, self-injury and suicidal ideas. All other systems reviewed and are negative.       Historical Information     Immunizations:   Immunization History   Administered Date(s) Administered    COVID-19 MODERNA VACC 0.5 ML IM 03/18/2021, 04/15/2021    H1N1 Inj 04/09/2010    INFLUENZA 10/01/2015    Influenza Quadrivalent 3 years and older 10/01/2015, 10/08/2020    Influenza Quadrivalent Preservative Free 3 years and older IM 12/06/2001, 12/06/2002, 12/05/2003, 10/19/2005, 11/02/2007, 10/17/2008, 11/10/2009, 10/28/2010, 10/11/2011, 09/11/2012, 09/16/2013, 11/07/2014, 11/03/2015, 10/24/2016, 10/20/2017, 10/10/2018    Influenza, high dose seasonal 0.7 mL 09/22/2021, 09/06/2022    Pneumococcal Conjugate 13-Valent 05/25/2016    Pneumococcal Polysaccharide PPV23 10/19/2005, 12/17/2010    Td (adult), adsorbed 09/08/2001    Tdap 01/18/2013, 12/07/2023    influenza, trivalent, adjuvanted 10/01/2019       Past Medical History:   Diagnosis Date    Diabetes mellitus ([address])     Hypertension     L2 vertebral fracture ([address])     Stricture esophagus     Stroke (cerebrum) ([address]) 08/29/2023     History reviewed. No pertinent family history. Past Surgical History:   Procedure Laterality Date    CATARACT EXTRACTION Bilateral     CHOLECYSTECTOMY      EGD       Social History     Tobacco Use    Smoking status: Former    Smokeless tobacco: Never   Vaping Use    Vaping Use: Never used   Substance Use Topics    Alcohol use: Not Currently    Drug use: Never     E-Cigarette/Vaping    E-Cigarette Use Never User      E-Cigarette/Vaping Substances    Nicotine No     THC No     CBD No     Flavoring No     Other No     Unknown No        Family History: History reviewed. No pertinent family history. Meds/Allergies   Prior to Admission Medications   Prescriptions Last Dose Informant Patient Reported? Taking? Insulin Pen Needle (B-D UF III MINI PEN NEEDLES) 31G X 5 MM MISC   Yes No   Sig: FOR USE WITH INSULIN PEN SYSTEM UP TO 7 TIMES A DAY AS DIRECTED Strength: 31G X 5 MM   OneTouch Delica Lancets 73K MISC   Yes No   aspirin (ECOTRIN LOW STRENGTH) 81 mg EC tablet   No No   Sig: Take 1 tablet (81 mg total) by mouth daily Do not start before September 6, 2023. atorvastatin (LIPITOR) 40 mg tablet   No No   Sig: Take 1 tablet (40 mg total) by mouth daily with dinner   fluticasone (FLOVENT HFA) 220 mcg/act inhaler   No No   Sig: Inhale 2 puffs every 12 (twelve) hours Rinse mouth after use.    furosemide (LASIX) 20 mg tablet   Yes No   Sig: Take 20 mg by mouth daily   gemfibrozil (LOPID) 600 mg tablet   Yes No   glucose blood (FreeStyle Precision Dudley Test) test strip   Yes No   Sig: May use 1 strip daily if needed for Freestyle Camilo 2 CGM system to monitor blood glucose   insulin degludec Omayra Pacini FlexTouch) 200 units/mL CONCENTRATED U-200 injection pen   Yes No   Sig: INJECT 70 U IN THE MORNING insulin detemir (LEVEMIR) 100 units/mL subcutaneous injection   No No   Sig: Inject 35 Units under the skin every 12 (twelve) hours   insulin lispro (HumaLOG) 100 units/mL injection   No No   Sig: Inject 7 Units under the skin 3 (three) times a day with meals   insulin lispro (HumaLOG) 100 units/mL injection   No No   Sig: Inject 1-6 Units under the skin daily at bedtime   insulin lispro (HumaLOG) 100 units/mL injection   No No   Sig: Inject 2-12 Units under the skin 3 (three) times a day before meals   levothyroxine 112 mcg tablet   Yes No   Sig: Take 125 mcg by mouth in the morning   losartan (COZAAR) 50 mg tablet   Yes No   Sig: Take 1 tablet by mouth daily   omeprazole (PriLOSEC) 40 MG capsule   Yes No   Si twice daily for 1 month then 1 daily   pantoprazole (PROTONIX) 40 mg tablet   No No   Sig: Take 1 tablet (40 mg total) by mouth 2 (two) times a day before meals   verapamil (CALAN-SR) 120 mg CR tablet   No No   Sig: Take 1 tablet (120 mg total) by mouth daily Do not start before 2023. Facility-Administered Medications: None       No Known Allergies    PHYSICAL EXAM    PE limited by: Nothing    Objective   Vitals:   First set: Temperature: 97.9 °F (36.6 °C) (23)  Pulse: 76 (23)  Respirations: 20 (23)  Blood Pressure: (!) 191/116 (23)  SpO2: 98 % (23)    Primary Survey:   (A) Airway: Intact  (B) Breathing: Intact  (C) Circulation: Pulses:   Normal  (D) Disabliity:  GCS Total:  15  (E) Expose:  Completed    Secondary Survey: (Click on Physical Exam tab above)  Physical Exam  Vitals and nursing note reviewed. Constitutional:       General: He is not in acute distress. Appearance: He is well-developed. HENT:      Head: Normocephalic and atraumatic. Right Ear: External ear normal.      Left Ear: External ear normal.   Eyes:      General: No scleral icterus. Right eye: No discharge. Left eye: No discharge. Extraocular Movements: Extraocular movements intact. Conjunctiva/sclera: Conjunctivae normal.   Cardiovascular:      Rate and Rhythm: Normal rate and regular rhythm. Heart sounds: Normal heart sounds. No murmur heard. Pulmonary:      Effort: Pulmonary effort is normal.      Breath sounds: Normal breath sounds. No wheezing or rales. Abdominal:      General: Bowel sounds are normal. There is no distension. Palpations: Abdomen is soft. Tenderness: There is no abdominal tenderness. There is no guarding or rebound. Musculoskeletal:         General: No deformity. Normal range of motion. Cervical back: Normal range of motion and neck supple. Comments: No C-spine, T-spine, L-spine tenderness or step-off. Chest nontender without crepitus  Pelvis stable  No long bone tenderness. Full range of motion all joints. Skin:     General: Skin is warm and dry. Findings: No rash. Comments: Multiple abrasions noted to the bilateral forearms and arms. No lacerations. Small scalp abrasion left temporal scalp   Neurological:      General: No focal deficit present. Mental Status: He is alert and oriented to person, place, and time. Cranial Nerves: No cranial nerve deficit. Psychiatric:         Mood and Affect: Mood normal.         Behavior: Behavior normal.         Thought Content: Thought content normal.         Judgment: Judgment normal.         Cervical spine cleared by clinical criteria? No (imaging required)      Invasive Devices       Peripheral Intravenous Line  Duration             Peripheral IV 12/07/23 Right;Upper;Ventral (anterior) Arm <1 day                    Lab Results:   Results Reviewed       None                   Imaging Studies:   Direct to CT: Yes  TRAUMA - CT head wo contrast   Final Result by Elsy Luther MD (12/07 8653)      No acute intracranial abnormality. Chronic microangiopathic changes.       Small air-fluid level in the right maxillary sinus may represent acute sinusitis in the appropriate clinical context. The study was marked in [facility] for immediate notification. Workstation performed: [id number]         TRAUMA - CT spine cervical wo contrast   Final Result by George Tomlinson MD (12/07 2342)      No cervical spine fracture or traumatic malalignment. Incidental thyroid nodule(s) for which nonemergent thyroid ultrasound is recommended. The study was marked in [facility] for immediate notification. Workstation performed: [id number]               Procedures  Procedures         ED Course  ED Course as of 12/07/23 2357   Thu Dec 07, 2023   2356 CXR was not obtained as patient did not suffer chest trauma and had nontender chest without crepitus  Pelvic plain film was not obtained as patient did not suffer any abdominal or pelvic trauma and had stable pelvis on examination    Patient deemed stable to proceed to any necessary CT imaging. Cervical spine was cleared after CT imaging             Medical Decision Making  Based on the history and medical screening exam performed the diagnostic considerations include but are not limited to skull fracture, intracranial hemorrhage, closed head injury, cervical spine fracture, other traumatic injury. Based on the work-up performed in the emergency room which includes physical examination, and which may include laboratory studies and imaging as warranted including advanced imaging such as CT scan or ultrasound, the differential diagnosis is narrowed to exclude limb or life-threatening process. The patient is stable for discharge. Trauma scans negative. Amount and/or Complexity of Data Reviewed  Labs:      Details: Not indicated  Radiology: ordered and independent interpretation performed. Decision-making details documented in ED Course. Details: Trauma imaging negative    Risk  Prescription drug management.                 Disposition  [facility] Transfer: No  Final diagnoses:   Injury of head, initial encounter     Time reflects when diagnosis was documented in both MDM as applicable and the Disposition within this note       Time User Action Codes Description Comment    12/7/2023 11:55 PM Virgilio Lowry Add [D05.20PX] Injury of head, initial encounter           ED Disposition       ED Disposition   Discharge    Condition   Stable    Date/Time   u Dec 7, 2023 11:55 PM    [address] discharge to home/self care. Follow-up Information       Follow up With Specialties Details Why Contact Info    Marcelino Singh, [address] [place] [address] Drive,[facility]  [place] [address]  [phone]            Patient's Medications   Discharge Prescriptions    No medications on file     No discharge procedures on file.     PDMP Review       None            ED Provider  Electronically Signed by           Virgilio Lowry MD  12/07/23 6134

## 2024-01-02 ENCOUNTER — HOSPITAL ENCOUNTER (EMERGENCY)
Facility: HOSPITAL | Age: 79
Discharge: HOME | End: 2024-01-03
Attending: EMERGENCY MEDICINE
Payer: COMMERCIAL

## 2024-01-02 ENCOUNTER — APPOINTMENT (OUTPATIENT)
Dept: RADIOLOGY | Facility: HOSPITAL | Age: 79
End: 2024-01-02
Payer: COMMERCIAL

## 2024-01-02 DIAGNOSIS — U07.1 COVID-19 VIRUS INFECTION: Primary | ICD-10-CM

## 2024-01-02 DIAGNOSIS — R53.1 GENERALIZED WEAKNESS: ICD-10-CM

## 2024-01-02 DIAGNOSIS — J06.9 URI (UPPER RESPIRATORY INFECTION): ICD-10-CM

## 2024-01-02 LAB
ALBUMIN SERPL BCP-MCNC: 3.8 G/DL (ref 3.5–5)
ALP SERPL-CCNC: 88 U/L (ref 34–104)
ALT SERPL W P-5'-P-CCNC: 19 U/L (ref 7–52)
ANION GAP SERPL CALCULATED.3IONS-SCNC: 8 MMOL/L
AST SERPL W P-5'-P-CCNC: 18 U/L (ref 13–39)
BASOPHILS # BLD AUTO: 0.05 THOUSANDS/ÂΜL (ref 0–0.1)
BASOPHILS NFR BLD AUTO: 1 % (ref 0–1)
BILIRUB SERPL-MCNC: 1.27 MG/DL (ref 0.2–1)
BNP SERPL-MCNC: 49 PG/ML (ref 0–100)
BUN SERPL-MCNC: 18 MG/DL (ref 5–25)
CALCIUM SERPL-MCNC: 9.1 MG/DL (ref 8.4–10.2)
CARDIAC TROPONIN I PNL SERPL HS: 7 NG/L
CHLORIDE SERPL-SCNC: 101 MMOL/L (ref 96–108)
CO2 SERPL-SCNC: 26 MMOL/L (ref 21–32)
CREAT SERPL-MCNC: 1.16 MG/DL (ref 0.6–1.3)
EOSINOPHIL # BLD AUTO: 0.12 THOUSAND/ÂΜL (ref 0–0.61)
EOSINOPHIL NFR BLD AUTO: 2 % (ref 0–6)
ERYTHROCYTE [DISTWIDTH] IN BLOOD BY AUTOMATED COUNT: 13.8 % (ref 11.6–15.1)
FLUAV RNA RESP QL NAA+PROBE: NEGATIVE
FLUBV RNA RESP QL NAA+PROBE: NEGATIVE
GFR SERPL CREATININE-BSD FRML MDRD: 59 ML/MIN/1.73SQ M
GLUCOSE SERPL-MCNC: 114 MG/DL (ref 65–140)
HCT VFR BLD AUTO: 46.6 % (ref 36.5–49.3)
HGB BLD-MCNC: 15.6 G/DL (ref 12–17)
IMM GRANULOCYTES # BLD AUTO: 0.03 THOUSAND/UL (ref 0–0.2)
IMM GRANULOCYTES NFR BLD AUTO: 0 % (ref 0–2)
LACTATE SERPL-SCNC: 1.6 MMOL/L (ref 0.5–2)
LYMPHOCYTES # BLD AUTO: 0.55 THOUSANDS/ÂΜL (ref 0.6–4.47)
LYMPHOCYTES NFR BLD AUTO: 8 % (ref 14–44)
MCH RBC QN AUTO: 29.2 PG (ref 26.8–34.3)
MCHC RBC AUTO-ENTMCNC: 33.5 G/DL (ref 31.4–37.4)
MCV RBC AUTO: 87 FL (ref 82–98)
MONOCYTES # BLD AUTO: 0.63 THOUSAND/ÂΜL (ref 0.17–1.22)
MONOCYTES NFR BLD AUTO: 9 % (ref 4–12)
NEUTROPHILS # BLD AUTO: 5.59 THOUSANDS/ÂΜL (ref 1.85–7.62)
NEUTS SEG NFR BLD AUTO: 80 % (ref 43–75)
NRBC BLD AUTO-RTO: 0 /100 WBCS
PLATELET # BLD AUTO: 183 THOUSANDS/UL (ref 149–390)
PMV BLD AUTO: 9.8 FL (ref 8.9–12.7)
POTASSIUM SERPL-SCNC: 4.4 MMOL/L (ref 3.5–5.3)
PROT SERPL-MCNC: 6.9 G/DL (ref 6.4–8.4)
RBC # BLD AUTO: 5.34 MILLION/UL (ref 3.88–5.62)
RSV RNA RESP QL NAA+PROBE: NEGATIVE
SARS-COV-2 RNA RESP QL NAA+PROBE: POSITIVE
SODIUM SERPL-SCNC: 135 MMOL/L (ref 135–147)
WBC # BLD AUTO: 6.97 THOUSAND/UL (ref 4.31–10.16)

## 2024-01-02 PROCEDURE — 36415 COLL VENOUS BLD VENIPUNCTURE: CPT

## 2024-01-02 PROCEDURE — 71045 X-RAY EXAM CHEST 1 VIEW: CPT

## 2024-01-02 PROCEDURE — 93005 ELECTROCARDIOGRAM TRACING: CPT

## 2024-01-02 PROCEDURE — 85025 COMPLETE CBC W/AUTO DIFF WBC: CPT | Performed by: EMERGENCY MEDICINE

## 2024-01-02 PROCEDURE — 83605 ASSAY OF LACTIC ACID: CPT | Performed by: EMERGENCY MEDICINE

## 2024-01-02 PROCEDURE — 83880 ASSAY OF NATRIURETIC PEPTIDE: CPT | Performed by: EMERGENCY MEDICINE

## 2024-01-02 PROCEDURE — 80053 COMPREHEN METABOLIC PANEL: CPT | Performed by: EMERGENCY MEDICINE

## 2024-01-02 PROCEDURE — 0241U HB NFCT DS VIR RESP RNA 4 TRGT: CPT | Performed by: EMERGENCY MEDICINE

## 2024-01-02 PROCEDURE — 84484 ASSAY OF TROPONIN QUANT: CPT | Performed by: EMERGENCY MEDICINE

## 2024-01-03 ENCOUNTER — APPOINTMENT (EMERGENCY)
Dept: CT IMAGING | Facility: HOSPITAL | Age: 79
End: 2024-01-03
Payer: COMMERCIAL

## 2024-01-03 VITALS
DIASTOLIC BLOOD PRESSURE: 70 MMHG | SYSTOLIC BLOOD PRESSURE: 126 MMHG | RESPIRATION RATE: 16 BRPM | HEART RATE: 90 BPM | TEMPERATURE: 96.3 F | BODY MASS INDEX: 34.09 KG/M2 | OXYGEN SATURATION: 95 % | WEIGHT: 251.32 LBS

## 2024-01-03 PROCEDURE — G1004 CDSM NDSC: HCPCS

## 2024-01-03 PROCEDURE — 70450 CT HEAD/BRAIN W/O DYE: CPT

## 2024-01-03 PROCEDURE — 99285 EMERGENCY DEPT VISIT HI MDM: CPT | Performed by: EMERGENCY MEDICINE

## 2024-01-03 RX ORDER — NIRMATRELVIR AND RITONAVIR 150-100 MG
2 KIT ORAL 2 TIMES DAILY
Qty: 20 TABLET | Refills: 0 | Status: SHIPPED | OUTPATIENT
Start: 2024-01-03 | End: 2024-01-08

## 2024-01-03 RX ORDER — ACETAMINOPHEN 325 MG/1
975 TABLET ORAL ONCE
Status: COMPLETED | OUTPATIENT
Start: 2024-01-03 | End: 2024-01-03

## 2024-01-03 RX ADMIN — ACETAMINOPHEN 975 MG: 325 TABLET, FILM COATED ORAL at 00:27

## 2024-01-03 NOTE — ED PROVIDER NOTES
History  Chief Complaint   Patient presents with    Altered Mental Status     Patient presents to the ED with sons. Both sons state that the patient has been altered since earlier today. The patient states he has not been voiding normally over the past few days. The sons state that his gate is unsteady and he is weak. Patient is alert and oriented at this time.      Patient is a 78-year-old male with a history of diabetes hypertension esophageal stricture and prior stroke presents to the emergency department due to 1 day of generalized fatigue and runny nose and congestion no cough or shortness of breath no fevers or chills.  Family reports patient has been acting more fatigued and a little bit confused today.        History provided by:  Patient and relative  Fatigue  Severity:  Moderate  Onset quality:  Gradual  Duration:  1 day  Timing:  Constant  Progression:  Worsening  Chronicity:  New  Associated symptoms: no abdominal pain, no arthralgias, no chest pain, no cough, no diarrhea, no dizziness, no dysuria, no fever, no frequency, no headaches, no myalgias, no nausea, no shortness of breath, no urgency and no vomiting        Prior to Admission Medications   Prescriptions Last Dose Informant Patient Reported? Taking?   Insulin Pen Needle (B-D UF III MINI PEN NEEDLES) 31G X 5 MM MISC   Yes No   Sig: FOR USE WITH INSULIN PEN SYSTEM UP TO 7 TIMES A DAY AS DIRECTED Strength: 31G X 5 MM   OneTouch Delica Lancets 33G MISC   Yes No   aspirin (ECOTRIN LOW STRENGTH) 81 mg EC tablet   No No   Sig: Take 1 tablet (81 mg total) by mouth daily Do not start before September 6, 2023.   atorvastatin (LIPITOR) 40 mg tablet   No No   Sig: Take 1 tablet (40 mg total) by mouth daily with dinner   fluticasone (FLOVENT HFA) 220 mcg/act inhaler   No No   Sig: Inhale 2 puffs every 12 (twelve) hours Rinse mouth after use.   furosemide (LASIX) 20 mg tablet   Yes No   Sig: Take 20 mg by mouth daily   gemfibrozil (LOPID) 600 mg tablet   Yes  No   glucose blood (FreeStyle Precision Dudley Test) test strip   Yes No   Sig: May use 1 strip daily if needed for Freestyle Camilo 2 CGM system to monitor blood glucose   insulin degludec (Tresiba FlexTouch) 200 units/mL CONCENTRATED U-200 injection pen   Yes No   Sig: INJECT 70 U IN THE MORNING   insulin detemir (LEVEMIR) 100 units/mL subcutaneous injection   No No   Sig: Inject 35 Units under the skin every 12 (twelve) hours   insulin lispro (HumaLOG) 100 units/mL injection   No No   Sig: Inject 7 Units under the skin 3 (three) times a day with meals   insulin lispro (HumaLOG) 100 units/mL injection   No No   Sig: Inject 1-6 Units under the skin daily at bedtime   insulin lispro (HumaLOG) 100 units/mL injection   No No   Sig: Inject 2-12 Units under the skin 3 (three) times a day before meals   levothyroxine 112 mcg tablet   Yes No   Sig: Take 125 mcg by mouth in the morning   losartan (COZAAR) 50 mg tablet   Yes No   Sig: Take 1 tablet by mouth daily   omeprazole (PriLOSEC) 40 MG capsule   Yes No   Si twice daily for 1 month then 1 daily   pantoprazole (PROTONIX) 40 mg tablet   No No   Sig: Take 1 tablet (40 mg total) by mouth 2 (two) times a day before meals   verapamil (CALAN-SR) 120 mg CR tablet   No No   Sig: Take 1 tablet (120 mg total) by mouth daily Do not start before 2023.      Facility-Administered Medications: None       Past Medical History:   Diagnosis Date    Diabetes mellitus (HCC)     Hypertension     L2 vertebral fracture (HCC)     Stricture esophagus     Stroke (cerebrum) (HCC) 2023       Past Surgical History:   Procedure Laterality Date    CATARACT EXTRACTION Bilateral     CHOLECYSTECTOMY      EGD         History reviewed. No pertinent family history.  I have reviewed and agree with the history as documented.    E-Cigarette/Vaping    E-Cigarette Use Never User      E-Cigarette/Vaping Substances    Nicotine No     THC No     CBD No     Flavoring No     Other No     Unknown  No      Social History     Tobacco Use    Smoking status: Former    Smokeless tobacco: Never   Vaping Use    Vaping status: Never Used   Substance Use Topics    Alcohol use: Not Currently    Drug use: Never       Review of Systems   Constitutional:  Positive for activity change and fatigue. Negative for appetite change, chills and fever.   HENT:  Positive for congestion and rhinorrhea. Negative for ear pain and sore throat.    Eyes:  Negative for discharge, redness and visual disturbance.   Respiratory:  Negative for cough, chest tightness, shortness of breath and wheezing.    Cardiovascular:  Negative for chest pain and palpitations.   Gastrointestinal:  Negative for abdominal pain, constipation, diarrhea, nausea and vomiting.   Endocrine: Negative for polydipsia and polyuria.   Genitourinary:  Negative for difficulty urinating, dysuria, frequency, hematuria and urgency.   Musculoskeletal:  Negative for arthralgias and myalgias.   Skin:  Negative for color change, pallor and rash.   Neurological:  Positive for weakness. Negative for dizziness, light-headedness, numbness and headaches.   Hematological:  Negative for adenopathy. Does not bruise/bleed easily.   Psychiatric/Behavioral:  Positive for confusion.    All other systems reviewed and are negative.      Physical Exam  Physical Exam  Vitals and nursing note reviewed.   Constitutional:       Appearance: He is well-developed.   HENT:      Head: Normocephalic and atraumatic.      Right Ear: External ear normal.      Left Ear: External ear normal.      Nose: Congestion present.   Eyes:      Conjunctiva/sclera: Conjunctivae normal.      Pupils: Pupils are equal, round, and reactive to light.   Cardiovascular:      Rate and Rhythm: Normal rate and regular rhythm.      Heart sounds: Normal heart sounds.   Pulmonary:      Effort: Pulmonary effort is normal. No respiratory distress.      Breath sounds: Normal breath sounds. No wheezing or rales.   Chest:      Chest  wall: No tenderness.   Abdominal:      General: Bowel sounds are normal. There is no distension.      Palpations: Abdomen is soft.      Tenderness: There is no abdominal tenderness. There is no guarding.   Musculoskeletal:         General: Normal range of motion.      Cervical back: Normal range of motion and neck supple.   Skin:     General: Skin is warm and dry.   Neurological:      Mental Status: He is alert and oriented to person, place, and time.      Cranial Nerves: No cranial nerve deficit.      Sensory: No sensory deficit.         Vital Signs  ED Triage Vitals   Temperature Pulse Respirations Blood Pressure SpO2   01/02/24 2113 01/02/24 2113 01/02/24 2113 01/02/24 2113 01/02/24 2113   (!) 96.3 °F (35.7 °C) 84 18 150/84 96 %      Temp Source Heart Rate Source Patient Position - Orthostatic VS BP Location FiO2 (%)   01/02/24 2113 01/02/24 2113 01/02/24 2113 01/02/24 2113 --   Temporal Monitor Lying Left arm       Pain Score       01/02/24 2110       2           Vitals:    01/02/24 2113 01/03/24 0015 01/03/24 0025   BP: 150/84 126/75    Pulse: 84 85 89   Patient Position - Orthostatic VS: Lying Sitting Sitting         Visual Acuity  Visual Acuity      Flowsheet Row Most Recent Value   L Pupil Size (mm) 2   R Pupil Size (mm) 2            ED Medications  Medications   acetaminophen (TYLENOL) tablet 975 mg (975 mg Oral Given 1/3/24 0027)       Diagnostic Studies  Results Reviewed       Procedure Component Value Units Date/Time    B-Type Natriuretic Peptide(BNP) [[id number]]  (Normal) Collected: 01/02/24 2120    Lab Status: Final result Specimen: Blood from Arm, Left Updated: 01/02/24 2218     BNP 49 pg/mL     FLU/RSV/COVID - if FLU/RSV clinically relevant [[id number]]  (Abnormal) Collected: 01/02/24 2120    Lab Status: Final result Specimen: Nares from Nose Updated: 01/02/24 2209     SARS-CoV-2 Positive     INFLUENZA A PCR Negative     INFLUENZA B PCR Negative     RSV PCR Negative    Narrative:      FOR PEDIATRIC  PATIENTS - copy/paste COVID Guidelines URL to browser: [email or web address]    SARS-CoV-2 assay is a Nucleic Acid Amplification assay intended for the  qualitative detection of nucleic acid from SARS-CoV-2 in nasopharyngeal  swabs. Results are for the presumptive identification of SARS-CoV-2 RNA.    Positive results are indicative of infection with SARS-CoV-2, the virus  causing COVID-19, but do not rule out bacterial infection or co-infection  with other viruses. Laboratories within the [place] and its  territories are required to report all positive results to the appropriate  public health authorities. Negative results do not preclude SARS-CoV-2  infection and should not be used as the sole basis for treatment or other  patient management decisions. Negative results must be combined with  clinical observations, patient history, and epidemiological information.  This test has not been FDA cleared or approved.    This test has been authorized by FDA under an Emergency Use Authorization  (EUA). This test is only authorized for the duration of time the  declaration that circumstances exist justifying the authorization of the  emergency use of an in vitro diagnostic tests for detection of SARS-CoV-2  virus and/or diagnosis of COVID-19 infection under section 564(b)(1) of  the Act, 21 U.S.C. 360bbb-3(b)(1), unless the authorization is terminated  or revoked sooner. The test has been validated but independent review by FDA  and CLIA is pending.    Test performed using iXpert GeneXpert: This RT-PCR assay targets N2,  a region unique to SARS-CoV-2. A conserved region in the E-gene was chosen  for pan-Sarbecovirus detection which includes SARS-CoV-2.    According to CMS-2020-01-R, this platform meets the definition of high-throughput technology.    HS Troponin 0hr (reflex protocol) [[id number]]  (Normal) Collected: 01/02/24 2120    Lab Status: Final result  Specimen: Blood from Arm, Left Updated: 01/02/24 2156     hs TnI 0hr 7 ng/L     Comprehensive metabolic panel [[id number]]  (Abnormal) Collected: 01/02/24 2120    Lab Status: Final result Specimen: Blood from Arm, Left Updated: 01/02/24 2148     Sodium 135 mmol/L      Potassium 4.4 mmol/L      Chloride 101 mmol/L      CO2 26 mmol/L      ANION GAP 8 mmol/L      BUN 18 mg/dL      Creatinine 1.16 mg/dL      Glucose 114 mg/dL      Calcium 9.1 mg/dL      AST 18 U/L      ALT 19 U/L      Alkaline Phosphatase 88 U/L      Total Protein 6.9 g/dL      Albumin 3.8 g/dL      Total Bilirubin 1.27 mg/dL      eGFR 59 ml/min/1.73sq m     Narrative:      National Kidney Disease Foundation guidelines for Chronic Kidney Disease (CKD):     Stage 1 with normal or high GFR (GFR > 90 mL/min/1.73 square meters)    Stage 2 Mild CKD (GFR = 60-89 mL/min/1.73 square meters)    Stage 3A Moderate CKD (GFR = 45-59 mL/min/1.73 square meters)    Stage 3B Moderate CKD (GFR = 30-44 mL/min/1.73 square meters)    Stage 4 Severe CKD (GFR = 15-29 mL/min/1.73 square meters)    Stage 5 End Stage CKD (GFR <15 mL/min/1.73 square meters)  Note: GFR calculation is accurate only with a steady state creatinine    Lactic acid, plasma (w/reflex if result > 2.0) [[id number]]  (Normal) Collected: 01/02/24 2120    Lab Status: Final result Specimen: Blood from Arm, Left Updated: 01/02/24 2146     LACTIC ACID 1.6 mmol/L     Narrative:      Result may be elevated if tourniquet was used during collection.    CBC and differential [[id number]]  (Abnormal) Collected: 01/02/24 2120    Lab Status: Final result Specimen: Blood from Arm, Left Updated: 01/02/24 2128     WBC 6.97 Thousand/uL      RBC 5.34 Million/uL      Hemoglobin 15.6 g/dL      Hematocrit 46.6 %      MCV 87 fL      MCH 29.2 pg      MCHC 33.5 g/dL      RDW 13.8 %      MPV 9.8 fL      Platelets 183 Thousands/uL      nRBC 0 /100 WBCs      Neutrophils Relative 80 %      Immat GRANS % 0 %      Lymphocytes Relative 8 %       Monocytes Relative 9 %      Eosinophils Relative 2 %      Basophils Relative 1 %      Neutrophils Absolute 5.59 Thousands/µL      Immature Grans Absolute 0.03 Thousand/uL      Lymphocytes Absolute 0.55 Thousands/µL      Monocytes Absolute 0.63 Thousand/µL      Eosinophils Absolute 0.12 Thousand/µL      Basophils Absolute 0.05 Thousands/µL     UA w Reflex to Microscopic w Reflex to Culture [[id number]]     Lab Status: No result Specimen: Urine                    CT head without contrast   Final Result by Fabien Camacho MD (01/03 0104)      Stable senescent changes without acute intracranial abnormality identified. If clinical concern for acute ischemia, recommend more sensitive MRI brain for better evaluation in this patient. Stable maxillary sinus disease.               Workstation performed: [id number]         XR chest 1 view portable   ED Interpretation by Flo Adrian DO (01/02 7706)   Elevated left hemidiaphragm no focal infiltrate                 Procedures  ECG 12 Lead Documentation Only    Date/Time: 1/3/2024 12:06 AM    Performed by: Flo Adrian DO  Authorized by: Flo Adrian DO    ECG reviewed by me, the ED Provider: yes    Patient location:  ED  Previous ECG:     Comparison to cardiac monitor: Yes    Quality:     Tracing quality:  Limited by artifact  Rate:     ECG rate:  86    ECG rate assessment: normal    Rhythm:     Rhythm: sinus rhythm and A-V block      Rhythm comment:  1st Degree AV block  QRS:     QRS intervals:  Wide  Conduction:     Conduction: abnormal      Abnormal conduction: complete RBBB    ST segments:     ST segments:  Normal  T waves:     T waves: non-specific             ED Course                               SBIRT 20yo+      Flowsheet Row Most Recent Value   Initial Alcohol Screen: US AUDIT-C     1. How often do you have a drink containing alcohol? 0 Filed at: 01/02/2024 2108   2. How many drinks containing alcohol do you have on a typical day you are drinking?  0 Filed  at: 01/02/2024 2109   3a. Male UNDER 65: How often do you have five or more drinks on one occasion? 0 Filed at: 01/02/2024 2109   3b. FEMALE Any Age, or MALE 65+: How often do you have 4 or more drinks on one occassion? 0 Filed at: 01/02/2024 2109   Audit-C Score 0 Filed at: 01/02/2024 2109   JEFF: How many times in the past year have you...    Used an illegal drug or used a prescription medication for non-medical reasons? Never Filed at: 01/02/2024 2109                      Medical Decision Making  Differential diagnosis included but not limited to stroke intracranial hemorrhage upper respiratory infection pneumonia COVID-19 virus infection.  Patient is afebrile nontoxic well-appearing clinically and hemodynamically stable in the emergency department normal nonfocal neurologic examination alert and oriented x 3 in the ED. normal O2 saturation on room air no respiratory distress no cough or difficulty breathing at this time.  No signs of focal pneumonia for now recommend Paxlovid for COVID-19 virus infection and Tylenol and supportive care and rest and plenty of fluids and prompt follow-up with primary physician for further evaluation and treatment and obtain test results. return precautions and anticipatory guidance discussed.      Problems Addressed:  COVID-19 virus infection: acute illness or injury  Generalized weakness: acute illness or injury  URI (upper respiratory infection): acute illness or injury    Amount and/or Complexity of Data Reviewed  Labs: ordered. Decision-making details documented in ED Course.  Radiology: ordered and independent interpretation performed. Decision-making details documented in ED Course.  ECG/medicine tests: ordered and independent interpretation performed. Decision-making details documented in ED Course.    Risk  OTC drugs.  Prescription drug management.             Disposition  Final diagnoses:   COVID-19 virus infection   URI (upper respiratory infection)   Generalized  weakness     Time reflects when diagnosis was documented in both MDM as applicable and the Disposition within this note       Time User Action Codes Description Comment    1/3/2024 12:52 AM Flo Adrian [U07.1] COVID-19 virus infection     1/3/2024 12:52 AM Flo Adrian [J06.9] URI (upper respiratory infection)     1/3/2024 12:53 AM Flo Adrian [R53.1] Generalized weakness           ED Disposition       ED Disposition   Discharge    Condition   Stable    Date/Time   Wed Mateo 3, 2024 0053    Comment   Pal Ledbetter discharge to home/self care.                   Follow-up Information       Follow up With Specialties Details Why Contact Info    Joseph Rinaldi MD Family Medicine Schedule an appointment as soon as possible for a visit in 2 days  [address] [place] [address]  [phone]              Patient's Medications   Discharge Prescriptions    NIRMATRELVIR & RITONAVIR (PAXLOVID, 150/100,) TABLET THERAPY PACK    Take 2 tablets by mouth 2 (two) times a day for 5 days Take 1 nirmatrelvir tablet + 1 ritonavir tablet together per dose       Start Date: 1/3/2024  End Date: 1/8/2024       Order Dose: 2 tablets       Quantity: 20 tablet    Refills: 0       No discharge procedures on file.    PDMP Review       None            ED Provider  Electronically Signed by             Flo Adrian DO  01/03/24 0109

## 2024-01-05 LAB
ATRIAL RATE: 86 BPM
P AXIS: 6 DEGREES
PR INTERVAL: 212 MS
QRS AXIS: 96 DEGREES
QRSD INTERVAL: 136 MS
QT INTERVAL: 378 MS
QTC INTERVAL: 452 MS
T WAVE AXIS: 10 DEGREES
VENTRICULAR RATE: 86 BPM

## 2024-04-29 ENCOUNTER — HOSPITAL ENCOUNTER (EMERGENCY)
Facility: HOSPITAL | Age: 79
Discharge: HOME | End: 2024-04-29
Attending: EMERGENCY MEDICINE
Payer: COMMERCIAL

## 2024-04-29 ENCOUNTER — APPOINTMENT (EMERGENCY)
Dept: RADIOLOGY | Facility: HOSPITAL | Age: 79
End: 2024-04-29
Payer: COMMERCIAL

## 2024-04-29 VITALS
RESPIRATION RATE: 18 BRPM | OXYGEN SATURATION: 95 % | DIASTOLIC BLOOD PRESSURE: 87 MMHG | TEMPERATURE: 98.1 F | SYSTOLIC BLOOD PRESSURE: 134 MMHG | HEIGHT: 72 IN | HEART RATE: 94 BPM | BODY MASS INDEX: 34 KG/M2 | WEIGHT: 251 LBS

## 2024-04-29 DIAGNOSIS — L03.116 CELLULITIS OF LEFT LOWER LEG: Primary | ICD-10-CM

## 2024-04-29 DIAGNOSIS — R60.0 EDEMA OF LEFT LOWER LEG: ICD-10-CM

## 2024-04-29 LAB
ALBUMIN SERPL BCP-MCNC: 3.7 G/DL (ref 3.5–5)
ALP SERPL-CCNC: 87 U/L (ref 34–104)
ALT SERPL W P-5'-P-CCNC: 13 U/L (ref 7–52)
ANION GAP SERPL CALCULATED.3IONS-SCNC: 8 MMOL/L (ref 4–13)
APTT PPP: 27 SECONDS (ref 23–37)
AST SERPL W P-5'-P-CCNC: 14 U/L (ref 13–39)
BASOPHILS # BLD AUTO: 0.07 THOUSANDS/ÂΜL (ref 0–0.1)
BASOPHILS NFR BLD AUTO: 1 % (ref 0–1)
BILIRUB SERPL-MCNC: 0.99 MG/DL (ref 0.2–1)
BNP SERPL-MCNC: 44 PG/ML (ref 0–100)
BUN SERPL-MCNC: 21 MG/DL (ref 5–25)
CALCIUM SERPL-MCNC: 9.2 MG/DL (ref 8.4–10.2)
CARDIAC TROPONIN I PNL SERPL HS: 6 NG/L
CHLORIDE SERPL-SCNC: 103 MMOL/L (ref 96–108)
CO2 SERPL-SCNC: 24 MMOL/L (ref 21–32)
CREAT SERPL-MCNC: 1.33 MG/DL (ref 0.6–1.3)
D DIMER PPP FEU-MCNC: 0.69 UG/ML FEU
EOSINOPHIL # BLD AUTO: 0.27 THOUSAND/ÂΜL (ref 0–0.61)
EOSINOPHIL NFR BLD AUTO: 3 % (ref 0–6)
ERYTHROCYTE [DISTWIDTH] IN BLOOD BY AUTOMATED COUNT: 14.7 % (ref 11.6–15.1)
GFR SERPL CREATININE-BSD FRML MDRD: 50 ML/MIN/1.73SQ M
GLUCOSE SERPL-MCNC: 119 MG/DL (ref 65–140)
HCT VFR BLD AUTO: 46.5 % (ref 36.5–49.3)
HGB BLD-MCNC: 15.8 G/DL (ref 12–17)
IMM GRANULOCYTES # BLD AUTO: 0.04 THOUSAND/UL (ref 0–0.2)
IMM GRANULOCYTES NFR BLD AUTO: 0 % (ref 0–2)
INR PPP: 0.98 (ref 0.84–1.19)
LYMPHOCYTES # BLD AUTO: 1.69 THOUSANDS/ÂΜL (ref 0.6–4.47)
LYMPHOCYTES NFR BLD AUTO: 17 % (ref 14–44)
MCH RBC QN AUTO: 28.5 PG (ref 26.8–34.3)
MCHC RBC AUTO-ENTMCNC: 34 G/DL (ref 31.4–37.4)
MCV RBC AUTO: 84 FL (ref 82–98)
MONOCYTES # BLD AUTO: 0.61 THOUSAND/ÂΜL (ref 0.17–1.22)
MONOCYTES NFR BLD AUTO: 6 % (ref 4–12)
NEUTROPHILS # BLD AUTO: 7.06 THOUSANDS/ÂΜL (ref 1.85–7.62)
NEUTS SEG NFR BLD AUTO: 73 % (ref 43–75)
NRBC BLD AUTO-RTO: 0 /100 WBCS
PLATELET # BLD AUTO: 205 THOUSANDS/UL (ref 149–390)
PMV BLD AUTO: 10.2 FL (ref 8.9–12.7)
POTASSIUM SERPL-SCNC: 4 MMOL/L (ref 3.5–5.3)
PROT SERPL-MCNC: 6.5 G/DL (ref 6.4–8.4)
PROTHROMBIN TIME: 13.3 SECONDS (ref 11.6–14.5)
RBC # BLD AUTO: 5.54 MILLION/UL (ref 3.88–5.62)
SODIUM SERPL-SCNC: 135 MMOL/L (ref 135–147)
WBC # BLD AUTO: 9.74 THOUSAND/UL (ref 4.31–10.16)

## 2024-04-29 PROCEDURE — 85025 COMPLETE CBC W/AUTO DIFF WBC: CPT | Performed by: EMERGENCY MEDICINE

## 2024-04-29 PROCEDURE — 85610 PROTHROMBIN TIME: CPT | Performed by: EMERGENCY MEDICINE

## 2024-04-29 PROCEDURE — 99285 EMERGENCY DEPT VISIT HI MDM: CPT | Performed by: EMERGENCY MEDICINE

## 2024-04-29 PROCEDURE — 85379 FIBRIN DEGRADATION QUANT: CPT | Performed by: EMERGENCY MEDICINE

## 2024-04-29 PROCEDURE — 80053 COMPREHEN METABOLIC PANEL: CPT | Performed by: EMERGENCY MEDICINE

## 2024-04-29 PROCEDURE — 71046 X-RAY EXAM CHEST 2 VIEWS: CPT

## 2024-04-29 PROCEDURE — 99284 EMERGENCY DEPT VISIT MOD MDM: CPT

## 2024-04-29 PROCEDURE — 83880 ASSAY OF NATRIURETIC PEPTIDE: CPT | Performed by: EMERGENCY MEDICINE

## 2024-04-29 PROCEDURE — 84484 ASSAY OF TROPONIN QUANT: CPT | Performed by: EMERGENCY MEDICINE

## 2024-04-29 PROCEDURE — 93005 ELECTROCARDIOGRAM TRACING: CPT

## 2024-04-29 PROCEDURE — 36415 COLL VENOUS BLD VENIPUNCTURE: CPT | Performed by: EMERGENCY MEDICINE

## 2024-04-29 PROCEDURE — 85730 THROMBOPLASTIN TIME PARTIAL: CPT | Performed by: EMERGENCY MEDICINE

## 2024-04-29 RX ORDER — CEPHALEXIN 500 MG/1
500 CAPSULE ORAL 4 TIMES DAILY
Qty: 40 CAPSULE | Refills: 0 | Status: SHIPPED | OUTPATIENT
Start: 2024-04-29 | End: 2024-05-09

## 2024-04-29 RX ORDER — CEPHALEXIN 500 MG/1
500 CAPSULE ORAL 4 TIMES DAILY
Qty: 40 CAPSULE | Refills: 0 | Status: SHIPPED | OUTPATIENT
Start: 2024-04-29 | End: 2024-04-29

## 2024-04-29 RX ORDER — CEPHALEXIN 250 MG/1
500 CAPSULE ORAL ONCE
Status: COMPLETED | OUTPATIENT
Start: 2024-04-29 | End: 2024-04-29

## 2024-04-29 RX ADMIN — CEPHALEXIN 500 MG: 250 CAPSULE ORAL at 21:30

## 2024-04-30 LAB
ATRIAL RATE: 93 BPM
P AXIS: 4 DEGREES
PR INTERVAL: 192 MS
QRS AXIS: 246 DEGREES
QRSD INTERVAL: 130 MS
QT INTERVAL: 378 MS
QTC INTERVAL: 469 MS
T WAVE AXIS: 31 DEGREES
VENTRICULAR RATE: 93 BPM

## 2024-04-30 NOTE — ED PROVIDER NOTES
History  Chief Complaint   Patient presents with    Leg Swelling     Left leg swelling over the last 2 weeks   denies pain or warmth   PCP wanted him checked for a blood clot     Patient presents to the emergency department brought in by family for evaluation of left lower extremity swelling that has been developing over the past couple of weeks.  Family reports that initially, the swelling will go down with elevation.  However, within the last day or 2, the swelling has been more persistent.  Patient denies any significant pain.  Has not noticed any fever, chills, nausea, vomiting, chest pain, or shortness of breath.  No prior history of DVT or PE.  Does take Lasix daily.  Takes a daily baby aspirin.  Had a stroke about 5 months ago.  No other complaints, modifying factors, or associated symptoms.        Prior to Admission Medications   Prescriptions Last Dose Informant Patient Reported? Taking?   Insulin Pen Needle (B-D UF III MINI PEN NEEDLES) 31G X 5 MM MISC   Yes No   Sig: FOR USE WITH INSULIN PEN SYSTEM UP TO 7 TIMES A DAY AS DIRECTED Strength: 31G X 5 MM   OneTouch Delica Lancets 33G MISC   Yes No   aspirin (ECOTRIN LOW STRENGTH) 81 mg EC tablet   No No   Sig: Take 1 tablet (81 mg total) by mouth daily Do not start before September 6, 2023.   atorvastatin (LIPITOR) 40 mg tablet   No No   Sig: Take 1 tablet (40 mg total) by mouth daily with dinner   fluticasone (FLOVENT HFA) 220 mcg/act inhaler   No No   Sig: Inhale 2 puffs every 12 (twelve) hours Rinse mouth after use.   furosemide (LASIX) 20 mg tablet   Yes No   Sig: Take 20 mg by mouth daily   gemfibrozil (LOPID) 600 mg tablet   Yes No   glucose blood (FreeStyle Precision Dudley Test) test strip   Yes No   Sig: May use 1 strip daily if needed for Freestyle Camilo 2 CGM system to monitor blood glucose   insulin degludec (Tresiba FlexTouch) 200 units/mL CONCENTRATED U-200 injection pen   Yes No   Sig: INJECT 70 U IN THE MORNING   insulin detemir (LEVEMIR) 100  units/mL subcutaneous injection   No No   Sig: Inject 35 Units under the skin every 12 (twelve) hours   insulin lispro (HumaLOG) 100 units/mL injection   No No   Sig: Inject 7 Units under the skin 3 (three) times a day with meals   insulin lispro (HumaLOG) 100 units/mL injection   No No   Sig: Inject 1-6 Units under the skin daily at bedtime   insulin lispro (HumaLOG) 100 units/mL injection   No No   Sig: Inject 2-12 Units under the skin 3 (three) times a day before meals   levothyroxine 112 mcg tablet   Yes No   Sig: Take 125 mcg by mouth in the morning   losartan (COZAAR) 50 mg tablet   Yes No   Sig: Take 1 tablet by mouth daily   omeprazole (PriLOSEC) 40 MG capsule   Yes No   Si twice daily for 1 month then 1 daily   pantoprazole (PROTONIX) 40 mg tablet   No No   Sig: Take 1 tablet (40 mg total) by mouth 2 (two) times a day before meals   verapamil (CALAN-SR) 120 mg CR tablet   No No   Sig: Take 1 tablet (120 mg total) by mouth daily Do not start before 2023.      Facility-Administered Medications: None       Past Medical History:   Diagnosis Date    Diabetes mellitus (HCC)     Hypertension     L2 vertebral fracture (HCC)     Stricture esophagus     Stroke (cerebrum) (HCC) 2023       Past Surgical History:   Procedure Laterality Date    CATARACT EXTRACTION Bilateral     CHOLECYSTECTOMY      EGD         History reviewed. No pertinent family history.  I have reviewed and agree with the history as documented.    E-Cigarette/Vaping    E-Cigarette Use Never User      E-Cigarette/Vaping Substances    Nicotine No     THC No     CBD No     Flavoring No     Other No     Unknown No      Social History     Tobacco Use    Smoking status: Former    Smokeless tobacco: Never   Vaping Use    Vaping status: Never Used   Substance Use Topics    Alcohol use: Not Currently    Drug use: Never       Review of Systems   All other systems reviewed and are negative.      Physical Exam  Physical Exam  Vitals and  nursing note reviewed.   Constitutional:       General: He is not in acute distress.     Appearance: He is well-developed.   HENT:      Head: Normocephalic and atraumatic.   Eyes:      Conjunctiva/sclera: Conjunctivae normal.   Cardiovascular:      Rate and Rhythm: Normal rate and regular rhythm.      Heart sounds: No murmur heard.  Pulmonary:      Effort: Pulmonary effort is normal. No respiratory distress.      Breath sounds: Normal breath sounds.   Abdominal:      Palpations: Abdomen is soft.      Tenderness: There is no abdominal tenderness.   Musculoskeletal:      Cervical back: Neck supple.      Left lower leg: Edema present.      Comments: Left lower extremity edema and erythema noted.  No calf tenderness.  Skin is warm to touch.  No open wounds.  No active drainage.  Negative Homans' sign.   Skin:     General: Skin is warm and dry.      Capillary Refill: Capillary refill takes less than 2 seconds.      Findings: Erythema present.   Neurological:      General: No focal deficit present.      Mental Status: He is alert and oriented to person, place, and time.   Psychiatric:         Mood and Affect: Mood normal.         Behavior: Behavior normal.         Vital Signs  ED Triage Vitals [04/29/24 1942]   Temperature Pulse Respirations Blood Pressure SpO2   98.1 °F (36.7 °C) 94 18 134/87 95 %      Temp Source Heart Rate Source Patient Position - Orthostatic VS BP Location FiO2 (%)   Temporal Monitor Sitting Right arm --      Pain Score       No Pain           Vitals:    04/29/24 1942   BP: 134/87   Pulse: 94   Patient Position - Orthostatic VS: Sitting         Visual Acuity      ED Medications  Medications   cephalexin (KEFLEX) capsule 500 mg (500 mg Oral Given 4/29/24 2130)       Diagnostic Studies  Results Reviewed       Procedure Component Value Units Date/Time    B-Type Natriuretic Peptide(BNP) [[id number]]  (Normal) Collected: 04/29/24 2039    Lab Status: Final result Specimen: Blood from Arm, Left Updated:  04/29/24 2119     BNP 44 pg/mL     HS Troponin 0hr (reflex protocol) [[id number]]  (Normal) Collected: 04/29/24 2039    Lab Status: Final result Specimen: Blood from Arm, Left Updated: 04/29/24 2112     hs TnI 0hr 6 ng/L     Comprehensive metabolic panel [[id number]]  (Abnormal) Collected: 04/29/24 2039    Lab Status: Final result Specimen: Blood from Arm, Left Updated: 04/29/24 2109     Sodium 135 mmol/L      Potassium 4.0 mmol/L      Chloride 103 mmol/L      CO2 24 mmol/L      ANION GAP 8 mmol/L      BUN 21 mg/dL      Creatinine 1.33 mg/dL      Glucose 119 mg/dL      Calcium 9.2 mg/dL      AST 14 U/L      ALT 13 U/L      Alkaline Phosphatase 87 U/L      Total Protein 6.5 g/dL      Albumin 3.7 g/dL      Total Bilirubin 0.99 mg/dL      eGFR 50 ml/min/1.73sq m     Narrative:      National Kid[employer] Disease Foundation guidelines for Chronic Kidney Disease (CKD):     Stage 1 with normal or high GFR (GFR > 90 mL/min/1.73 square meters)    Stage 2 Mild CKD (GFR = 60-89 mL/min/1.73 square meters)    Stage 3A Moderate CKD (GFR = 45-59 mL/min/1.73 square meters)    Stage 3B Moderate CKD (GFR = 30-44 mL/min/1.73 square meters)    Stage 4 Severe CKD (GFR = 15-29 mL/min/1.73 square meters)    Stage 5 End Stage CKD (GFR <15 mL/min/1.73 square meters)  Note: GFR calculation is accurate only with a steady state creatinine    D-Dimer [[id number]]  (Abnormal) Collected: 04/29/24 2039    Lab Status: Final result Specimen: Blood from Arm, Left Updated: 04/29/24 2107     D-Dimer, Quant 0.69 ug/ml FEU     Narrative:      In the evaluation for possible pulmonary embolism, in the appropriate (Well's Score of 4 or less) patient, the age adjusted d-dimer cutoff for this patient can be calculated as:    Age x 0.01 (in ug/mL) for Age-adjusted D-dimer exclusion threshold for a patient over 50 years.    Protime-INR [[id number]]  (Normal) Collected: 04/29/24 2039    Lab Status: Final result Specimen: Blood from Arm, Left Updated: 04/29/24 2101      Protime 13.3 seconds      INR 0.98    APTT [[id number]]  (Normal) Collected: 04/29/24 2039    Lab Status: Final result Specimen: Blood from Arm, Left Updated: 04/29/24 2101     PTT 27 seconds     CBC and differential [[id number]] Collected: 04/29/24 2039    Lab Status: Final result Specimen: Blood from Arm, Left Updated: 04/29/24 2045     WBC 9.74 Thousand/uL      RBC 5.54 Million/uL      Hemoglobin 15.8 g/dL      Hematocrit 46.5 %      MCV 84 fL      MCH 28.5 pg      MCHC 34.0 g/dL      RDW 14.7 %      MPV 10.2 fL      Platelets 205 Thousands/uL      nRBC 0 /100 WBCs      Segmented % 73 %      Immature Grans % 0 %      Lymphocytes % 17 %      Monocytes % 6 %      Eosinophils Relative 3 %      Basophils Relative 1 %      Absolute Neutrophils 7.06 Thousands/µL      Absolute Immature Grans 0.04 Thousand/uL      Absolute Lymphocytes 1.69 Thousands/µL      Absolute Monocytes 0.61 Thousand/µL      Eosinophils Absolute 0.27 Thousand/µL      Basophils Absolute 0.07 Thousands/µL                    XR chest 2 views   ED Interpretation by Cruz Mckee MD (04/29 2113)   No acute cardiopulmonary disease; no cardiomegaly or pulmonary edema seen.                 Procedures  Procedures         ED Course                               SBIRT 22yo+      Flowsheet Row Most Recent Value   Initial Alcohol Screen: US AUDIT-C     1. How often do you have a drink containing alcohol? 0 Filed at: 04/29/2024 2000   2. How many drinks containing alcohol do you have on a typical day you are drinking?  0 Filed at: 04/29/2024 2000   3b. FEMALE Any Age, or MALE 65+: How often do you have 4 or more drinks on one occassion? 0 Filed at: 04/29/2024 2000   Audit-C Score 0 Filed at: 04/29/2024 2000   JEFF: How many times in the past year have you...    Used an illegal drug or used a prescription medication for non-medical reasons? Never Filed at: 04/29/2024 2000                      Medical Decision Making  Patient was seen and evaluated.  Workup  as shown for left lower extremity edema as well as erythema.  Chest x-ray shows no cardiomegaly or pulmonary edema.  Laboratory workup largely unremarkable.  Shows mildly abnormal renal function, appears to be consistent with baseline.  White blood cell count normal.  Troponin and BNP within normal limits.  EKG without acute ischemia.  D-dimer at 0.69, within age-adjusted limits.  Clinically doubt DVT given findings more consistent with cellulitis, lack of history of DVT, D-dimer within age-adjusted limits.  Will start patient on course of Keflex.  Advised close primary care follow-up for recheck of symptoms, further management recommendations.  Return precautions reviewed.  All questions answered.  Stable for discharge from the emergency department.    Amount and/or Complexity of Data Reviewed  Labs: ordered.  Radiology: ordered and independent interpretation performed.  ECG/medicine tests: ordered and independent interpretation performed.     Details: EKG by my interpretation demonstrates sinus rhythm with premature atrial complexes at a ventricular rate of 93 bpm.  Right bundle branch block.  No acute ST elevations or T wave inversions.  Compared with prior EKG from January 2, 2024, KS interval has shortened by 20 ms.    Risk  Prescription drug management.             Disposition  Final diagnoses:   Edema of left lower leg   Cellulitis of left lower leg     Time reflects when diagnosis was documented in both MDM as applicable and the Disposition within this note       Time User Action Codes Description Comment    4/29/2024  9:28 PM Cruz Mckee Add [R60.0] Edema of left lower leg     4/29/2024  9:29 PM Cruz Mckee Add [L03.116] Cellulitis of left lower leg     4/29/2024  9:29 PM Cruz Mckee Modify [R60.0] Edema of left lower leg     4/29/2024  9:29 PM Cruz Mckee Modify [L03.116] Cellulitis of left lower leg           ED Disposition       ED Disposition   Discharge    Condition   Stable    Date/Time    Mon Apr 29, 2024 2128    Comment   Pal EDMUND Ledbetter discharge to home/self care.                   Follow-up Information       Follow up With Specialties Details Why Contact Info Additional Information    Joseph Rinaldi MD Family Medicine Call   [address] [facility] [place] [id number] [phone]       [facility] Emergency Medicine  As needed, If symptoms worsen [address] [facility] [id number] [phone] [facility], [address] [place], [place], [address]            Patient's Medications   Discharge Prescriptions    CEPHALEXIN (KEFLEX) 500 MG CAPSULE    Take 1 capsule (500 mg total) by mouth 4 (four) times a day for 10 days       Start Date: 4/29/2024 End Date: 5/9/2024       Order Dose: 500 mg       Quantity: 40 capsule    Refills: 0       No discharge procedures on file.    PDMP Review       None            ED Provider  Electronically Signed by             Cruz Mckee MD  04/29/24 2132

## 2024-06-07 ENCOUNTER — HOSPITAL ENCOUNTER (EMERGENCY)
Facility: HOSPITAL | Age: 79
Discharge: HOME | End: 2024-06-07
Attending: EMERGENCY MEDICINE
Payer: COMMERCIAL

## 2024-06-07 VITALS
DIASTOLIC BLOOD PRESSURE: 80 MMHG | RESPIRATION RATE: 22 BRPM | HEIGHT: 72 IN | SYSTOLIC BLOOD PRESSURE: 177 MMHG | OXYGEN SATURATION: 99 % | TEMPERATURE: 97 F | BODY MASS INDEX: 35.12 KG/M2 | WEIGHT: 259.26 LBS | HEART RATE: 70 BPM

## 2024-06-07 DIAGNOSIS — R04.0 LEFT-SIDED EPISTAXIS: Primary | ICD-10-CM

## 2024-06-07 PROCEDURE — 99282 EMERGENCY DEPT VISIT SF MDM: CPT

## 2024-06-07 PROCEDURE — 99284 EMERGENCY DEPT VISIT MOD MDM: CPT | Performed by: PHYSICIAN ASSISTANT

## 2024-06-07 PROCEDURE — 30903 CONTROL OF NOSEBLEED: CPT | Performed by: PHYSICIAN ASSISTANT

## 2024-06-07 RX ORDER — AMOXICILLIN AND CLAVULANATE POTASSIUM 875; 125 MG/1; MG/1
1 TABLET, FILM COATED ORAL EVERY 12 HOURS
Qty: 6 TABLET | Refills: 0 | Status: SHIPPED | OUTPATIENT
Start: 2024-06-07 | End: 2024-06-10

## 2024-06-07 NOTE — ED PROVIDER NOTES
History  Chief Complaint   Patient presents with    Nose Bleed     Pt arrives with nose bleed since 0830am today. +asa.      The patient is a 78-year-old male on aspirin past medical history of stroke who presents with a complaint of left-sided nosebleed since 8 AM this morning.  Son did pack his nose with a paper towel and had some improvement but then it did rebleed around 10 AM.  Did bring in for evaluation.  No falls or traumas          Prior to Admission Medications   Prescriptions Last Dose Informant Patient Reported? Taking?   Insulin Pen Needle (B-D UF III MINI PEN NEEDLES) 31G X 5 MM MISC   Yes No   Sig: FOR USE WITH INSULIN PEN SYSTEM UP TO 7 TIMES A DAY AS DIRECTED Strength: 31G X 5 MM   OneTouch Delica Lancets 33G MISC   Yes No   aspirin (ECOTRIN LOW STRENGTH) 81 mg EC tablet   No No   Sig: Take 1 tablet (81 mg total) by mouth daily Do not start before September 6, 2023.   atorvastatin (LIPITOR) 40 mg tablet   No No   Sig: Take 1 tablet (40 mg total) by mouth daily with dinner   fluticasone (FLOVENT HFA) 220 mcg/act inhaler   No No   Sig: Inhale 2 puffs every 12 (twelve) hours Rinse mouth after use.   furosemide (LASIX) 20 mg tablet   Yes No   Sig: Take 20 mg by mouth daily   gemfibrozil (LOPID) 600 mg tablet   Yes No   glucose blood (FreeStyle Precision Dudley Test) test strip   Yes No   Sig: May use 1 strip daily if needed for Freestyle Camilo 2 CGM system to monitor blood glucose   insulin degludec (Tresiba FlexTouch) 200 units/mL CONCENTRATED U-200 injection pen   Yes No   Sig: INJECT 70 U IN THE MORNING   insulin detemir (LEVEMIR) 100 units/mL subcutaneous injection   No No   Sig: Inject 35 Units under the skin every 12 (twelve) hours   insulin lispro (HumaLOG) 100 units/mL injection   No No   Sig: Inject 7 Units under the skin 3 (three) times a day with meals   insulin lispro (HumaLOG) 100 units/mL injection   No No   Sig: Inject 1-6 Units under the skin daily at bedtime   insulin lispro (HumaLOG) 100  units/mL injection   No No   Sig: Inject 2-12 Units under the skin 3 (three) times a day before meals   levothyroxine 112 mcg tablet   Yes No   Sig: Take 125 mcg by mouth in the morning   losartan (COZAAR) 50 mg tablet   Yes No   Sig: Take 1 tablet by mouth daily   omeprazole (PriLOSEC) 40 MG capsule   Yes No   Si twice daily for 1 month then 1 daily   pantoprazole (PROTONIX) 40 mg tablet   No No   Sig: Take 1 tablet (40 mg total) by mouth 2 (two) times a day before meals   verapamil (CALAN-SR) 120 mg CR tablet   No No   Sig: Take 1 tablet (120 mg total) by mouth daily Do not start before 2023.      Facility-Administered Medications: None       Past Medical History:   Diagnosis Date    Diabetes mellitus (HCC)     Hypertension     L2 vertebral fracture (HCC)     Stricture esophagus     Stroke (cerebrum) (HCC) 2023       Past Surgical History:   Procedure Laterality Date    CATARACT EXTRACTION Bilateral     CHOLECYSTECTOMY      EGD         History reviewed. No pertinent family history.  I have reviewed and agree with the history as documented.    E-Cigarette/Vaping    E-Cigarette Use Never User      E-Cigarette/Vaping Substances    Nicotine No     THC No     CBD No     Flavoring No     Other No     Unknown No      Social History     Tobacco Use    Smoking status: Former    Smokeless tobacco: Never   Vaping Use    Vaping status: Never Used   Substance Use Topics    Alcohol use: Not Currently    Drug use: Never       Review of Systems   All other systems reviewed and are negative.      Physical Exam  Physical Exam  Vitals and nursing note reviewed.   Constitutional:       General: He is not in acute distress.     Appearance: He is well-developed.   HENT:      Head: Normocephalic.      Nose:      Comments: excoriated left-sided anterior septal wall, bleeding noted.     Mouth/Throat:      Mouth: Oropharynx is clear and moist.   Eyes:      Extraocular Movements: Extraocular movements intact and EOM  normal.      Pupils: Pupils are equal, round, and reactive to light.   Cardiovascular:      Rate and Rhythm: Normal rate.   Pulmonary:      Effort: Pulmonary effort is normal.   Skin:     General: Skin is warm and dry.      Capillary Refill: Capillary refill takes less than 2 seconds.   Neurological:      Mental Status: He is alert and oriented to person, place, and time.   Psychiatric:         Mood and Affect: Mood and affect normal.         Behavior: Behavior normal.         Vital Signs  ED Triage Vitals [06/07/24 1402]   Temperature Pulse Respirations Blood Pressure SpO2   (!) 97 °F (36.1 °C) 70 18 (!) 184/88 97 %      Temp Source Heart Rate Source Patient Position - Orthostatic VS BP Location FiO2 (%)   Temporal Monitor Sitting Right arm --      Pain Score       No Pain           Vitals:    06/07/24 1402 06/07/24 1445   BP: (!) 184/88 (!) 177/80   Pulse: 70 70   Patient Position - Orthostatic VS: Sitting          Visual Acuity      ED Medications  Medications - No data to display    Diagnostic Studies  Results Reviewed       None                   No orders to display              Procedures  Epistaxis management    Date/Time: 6/7/2024 3:04 PM    Performed by: Oneil Gibson PA-C  Authorized by: Oneil Gibson PA-C  Universal Protocol:  Procedure performed by:  Consent: Verbal consent obtained.  Consent given by: patient  Patient identity confirmed: verbally with patient    Patient location:  ED  Anesthesia (see MAR for exact dosages):     Anesthesia method:  None  Procedure details:     Treatment site:  L anterior    Hemostasis method:  Nasal tampon    Approach:  External    Spec Headlamp used: No      Treatment complexity:  Extensive    Treatment episode: initial    Post-procedure details:     Assessment:  Bleeding decreased    Patient tolerance of procedure:  Tolerated well, no immediate complications  Comments:      Did re bleed  multiple times and had saline balloon increased            ED  Course  ED Course as of 06/07/24 1821   Fri Jun 07, 2024   1423 rhinorocket   1450 Had bleeding around the packing readjusted    1540 Was monitored till 320 and did well bleeding controlled and then when going to leave the nose rebled around packing    1548 Only a small ooze, increased pressure by injecting more saline                                              Medical Decision Making  The patient is a 78-year-old male on aspirin past medical history of stroke who presents with a complaint of left-sided nosebleed since 8 AM this morning.  Son did pack his nose with a paper towel and had some improvement but then it did rebleed around 10 AM.  Did bring in for evaluation.  No falls or traumas    Left sided anterior Rhino Rocket was placed, he was monitored for 2 and half hours did have multiple episodes of breakthrough bleeding but ultimately was controlled.  Placed on Augmentin and educated to have this removed in 48 to 72 hours by ENT.    Risk  Prescription drug management.             Disposition  Final diagnoses:   Left-sided epistaxis     Time reflects when diagnosis was documented in both MDM as applicable and the Disposition within this note       Time User Action Codes Description Comment    6/7/2024  2:33 PM Oneil Gibson Add [R04.0] Left-sided epistaxis           ED Disposition       ED Disposition   Discharge    Condition   Stable    Date/Time   Fri Jun 7, 2024  3:56 PM    Comment   Pal Ledbetter discharge to home/self care.                   Follow-up Information       Follow up With Specialties Details Why Contact Info    Sally Ibrahim MD Otolaryngology Schedule an appointment as soon as possible for a visit   [address] [place] [id number]  [phone]              Discharge Medication List as of 6/7/2024  2:35 PM        START taking these medications    Details   amoxicillin-clavulanate (AUGMENTIN) 875-125 mg per tablet Take 1 tablet by mouth every 12 (twelve) hours for 3 days, Starting  Fri 6/7/2024, Until Mon 6/10/2024, Normal           CONTINUE these medications which have NOT CHANGED    Details   aspirin (ECOTRIN LOW STRENGTH) 81 mg EC tablet Take 1 tablet (81 mg total) by mouth daily Do not start before September 6, 2023., Starting Wed 9/6/2023, No Print      atorvastatin (LIPITOR) 40 mg tablet Take 1 tablet (40 mg total) by mouth daily with dinner, Starting Tue 9/5/2023, No Print      fluticasone (FLOVENT HFA) 220 mcg/act inhaler Inhale 2 puffs every 12 (twelve) hours Rinse mouth after use., Starting Tue 9/5/2023, Normal      furosemide (LASIX) 20 mg tablet Take 20 mg by mouth daily, Starting Tue 10/3/2023, Historical Med      gemfibrozil (LOPID) 600 mg tablet Historical Med      glucose blood (FreeStyle Precision Dudley Test) test strip May use 1 strip daily if needed for Freestyle Camilo 2 CGM system to monitor blood glucose, Historical Med      insulin degludec (Tresiba FlexTouch) 200 units/mL CONCENTRATED U-200 injection pen INJECT 70 U IN THE MORNING, Historical Med      insulin detemir (LEVEMIR) 100 units/mL subcutaneous injection Inject 35 Units under the skin every 12 (twelve) hours, Starting Tue 9/5/2023, No Print      !! insulin lispro (HumaLOG) 100 units/mL injection Inject 7 Units under the skin 3 (three) times a day with meals, Starting Tue 9/5/2023, No Print      !! insulin lispro (HumaLOG) 100 units/mL injection Inject 1-6 Units under the skin daily at bedtime, Starting Tue 9/5/2023, No Print      !! insulin lispro (HumaLOG) 100 units/mL injection Inject 2-12 Units under the skin 3 (three) times a day before meals, Starting Tue 9/5/2023, No Print      Insulin Pen Needle (B-D UF III MINI PEN NEEDLES) 31G X 5 MM MISC FOR USE WITH INSULIN PEN SYSTEM UP TO 7 TIMES A DAY AS DIRECTED Strength: 31G X 5 MM, Historical Med      levothyroxine 112 mcg tablet Take 125 mcg by mouth in the morning, Historical Med      losartan (COZAAR) 50 mg tablet Take 1 tablet by mouth daily, Starting Mon  12/19/2022, Historical Med      omeprazole (PriLOSEC) 40 MG capsule 1 twice daily for 1 month then 1 daily, Historical Med      OneTouch Delica Lancets 33G MISC Historical Med      pantoprazole (PROTONIX) 40 mg tablet Take 1 tablet (40 mg total) by mouth 2 (two) times a day before meals, Starting Tue 9/5/2023, No Print      verapamil (CALAN-SR) 120 mg CR tablet Take 1 tablet (120 mg total) by mouth daily Do not start before September 6, 2023., Starting Wed 9/6/2023, No Print       !! - Potential duplicate medications found. Please discuss with provider.              PDMP Review       None            ED Provider  Electronically Signed by             Oneil Gibson PA-C  06/07/24 8498

## 2024-09-13 ENCOUNTER — HOSPITAL ENCOUNTER (EMERGENCY)
Facility: HOSPITAL | Age: 79
Discharge: HOME | End: 2024-09-13
Attending: EMERGENCY MEDICINE
Payer: COMMERCIAL

## 2024-09-13 ENCOUNTER — APPOINTMENT (EMERGENCY)
Dept: CT IMAGING | Facility: HOSPITAL | Age: 79
End: 2024-09-13
Payer: COMMERCIAL

## 2024-09-13 VITALS
OXYGEN SATURATION: 97 % | WEIGHT: 255.95 LBS | DIASTOLIC BLOOD PRESSURE: 91 MMHG | RESPIRATION RATE: 16 BRPM | HEIGHT: 72 IN | SYSTOLIC BLOOD PRESSURE: 182 MMHG | HEART RATE: 81 BPM | BODY MASS INDEX: 34.67 KG/M2 | TEMPERATURE: 97.9 F

## 2024-09-13 DIAGNOSIS — M79.89 LEG SWELLING: Primary | ICD-10-CM

## 2024-09-13 DIAGNOSIS — R22.42 LOCALIZED SWELLING, MASS AND LUMP, LEFT LOWER LIMB: ICD-10-CM

## 2024-09-13 LAB
ALBUMIN SERPL BCG-MCNC: 3.8 G/DL (ref 3.5–5)
ALP SERPL-CCNC: 83 U/L (ref 34–104)
ALT SERPL W P-5'-P-CCNC: 17 U/L (ref 7–52)
ANION GAP SERPL CALCULATED.3IONS-SCNC: 5 MMOL/L (ref 4–13)
AST SERPL W P-5'-P-CCNC: 20 U/L (ref 13–39)
BASOPHILS # BLD AUTO: 0.06 THOUSANDS/ÂΜL (ref 0–0.1)
BASOPHILS NFR BLD AUTO: 1 % (ref 0–1)
BILIRUB SERPL-MCNC: 0.84 MG/DL (ref 0.2–1)
BNP SERPL-MCNC: 41 PG/ML (ref 0–100)
BUN SERPL-MCNC: 28 MG/DL (ref 5–25)
CALCIUM SERPL-MCNC: 9.2 MG/DL (ref 8.4–10.2)
CARDIAC TROPONIN I PNL SERPL HS: 9 NG/L
CHLORIDE SERPL-SCNC: 103 MMOL/L (ref 96–108)
CO2 SERPL-SCNC: 26 MMOL/L (ref 21–32)
CREAT SERPL-MCNC: 1.2 MG/DL (ref 0.6–1.3)
D DIMER PPP FEU-MCNC: 1.12 UG/ML FEU
EOSINOPHIL # BLD AUTO: 0.24 THOUSAND/ÂΜL (ref 0–0.61)
EOSINOPHIL NFR BLD AUTO: 3 % (ref 0–6)
ERYTHROCYTE [DISTWIDTH] IN BLOOD BY AUTOMATED COUNT: 14.7 % (ref 11.6–15.1)
GFR SERPL CREATININE-BSD FRML MDRD: 57 ML/MIN/1.73SQ M
GLUCOSE SERPL-MCNC: 166 MG/DL (ref 65–140)
HCT VFR BLD AUTO: 46.5 % (ref 36.5–49.3)
HGB BLD-MCNC: 15.5 G/DL (ref 12–17)
IMM GRANULOCYTES # BLD AUTO: 0.05 THOUSAND/UL (ref 0–0.2)
IMM GRANULOCYTES NFR BLD AUTO: 1 % (ref 0–2)
LYMPHOCYTES # BLD AUTO: 1.5 THOUSANDS/ÂΜL (ref 0.6–4.47)
LYMPHOCYTES NFR BLD AUTO: 21 % (ref 14–44)
MCH RBC QN AUTO: 28.4 PG (ref 26.8–34.3)
MCHC RBC AUTO-ENTMCNC: 33.3 G/DL (ref 31.4–37.4)
MCV RBC AUTO: 85 FL (ref 82–98)
MONOCYTES # BLD AUTO: 0.63 THOUSAND/ÂΜL (ref 0.17–1.22)
MONOCYTES NFR BLD AUTO: 9 % (ref 4–12)
NEUTROPHILS # BLD AUTO: 4.76 THOUSANDS/ÂΜL (ref 1.85–7.62)
NEUTS SEG NFR BLD AUTO: 65 % (ref 43–75)
NRBC BLD AUTO-RTO: 0 /100 WBCS
PLATELET # BLD AUTO: 180 THOUSANDS/UL (ref 149–390)
PMV BLD AUTO: 10.2 FL (ref 8.9–12.7)
POTASSIUM SERPL-SCNC: 4.7 MMOL/L (ref 3.5–5.3)
PROT SERPL-MCNC: 6.8 G/DL (ref 6.4–8.4)
RBC # BLD AUTO: 5.45 MILLION/UL (ref 3.88–5.62)
SODIUM SERPL-SCNC: 134 MMOL/L (ref 135–147)
WBC # BLD AUTO: 7.24 THOUSAND/UL (ref 4.31–10.16)

## 2024-09-13 PROCEDURE — 93005 ELECTROCARDIOGRAM TRACING: CPT

## 2024-09-13 PROCEDURE — 80053 COMPREHEN METABOLIC PANEL: CPT | Performed by: EMERGENCY MEDICINE

## 2024-09-13 PROCEDURE — 99284 EMERGENCY DEPT VISIT MOD MDM: CPT

## 2024-09-13 PROCEDURE — 99285 EMERGENCY DEPT VISIT HI MDM: CPT | Performed by: EMERGENCY MEDICINE

## 2024-09-13 PROCEDURE — 36415 COLL VENOUS BLD VENIPUNCTURE: CPT | Performed by: EMERGENCY MEDICINE

## 2024-09-13 PROCEDURE — 96372 THER/PROPH/DIAG INJ SC/IM: CPT

## 2024-09-13 PROCEDURE — 71275 CT ANGIOGRAPHY CHEST: CPT

## 2024-09-13 PROCEDURE — 85025 COMPLETE CBC W/AUTO DIFF WBC: CPT | Performed by: EMERGENCY MEDICINE

## 2024-09-13 PROCEDURE — 84484 ASSAY OF TROPONIN QUANT: CPT | Performed by: EMERGENCY MEDICINE

## 2024-09-13 PROCEDURE — 83880 ASSAY OF NATRIURETIC PEPTIDE: CPT | Performed by: EMERGENCY MEDICINE

## 2024-09-13 PROCEDURE — 85379 FIBRIN DEGRADATION QUANT: CPT | Performed by: EMERGENCY MEDICINE

## 2024-09-13 RX ORDER — ENOXAPARIN SODIUM 100 MG/ML
100 INJECTION SUBCUTANEOUS ONCE
Status: COMPLETED | OUTPATIENT
Start: 2024-09-13 | End: 2024-09-13

## 2024-09-13 RX ADMIN — IOHEXOL 100 ML: 350 INJECTION, SOLUTION INTRAVENOUS at 20:47

## 2024-09-13 RX ADMIN — ENOXAPARIN SODIUM 100 MG: 100 INJECTION SUBCUTANEOUS at 23:07

## 2024-09-13 NOTE — ED PROVIDER NOTES
No diagnosis found.  ED Disposition       None          Assessment & Plan       Medical Decision Making  Based on the history and medical screening exam performed the may be at risk for DVT, lymphedema, dependent edema, PE.    Based on the work-up performed in the emergency room which includes physical examination, and which may include laboratory studies and imaging as warranted including advanced imaging such as CT scan or ultrasound, the diagnostic considerations are narrowed to exclude limb or life-threatening process.    Signed out to Dr. Fam pending CTA chest PE study.  Plan for discharge, subcu Lovenox and outpatient DVT study if this is negative.    Amount and/or Complexity of Data Reviewed  Labs: ordered. Decision-making details documented in ED Course.     Details: Elevated D-dimer noted  Radiology: ordered and independent interpretation performed. Decision-making details documented in ED Course.     Details: Pending CTA chest PE Study  ECG/medicine tests: ordered and independent interpretation performed. Decision-making details documented in ED Course.     Details: Normal sinus rhythm rate 79 with PVC and PAC    Risk  Prescription drug management.                  ED Course as of 09/13/24 2100   Fri Sep 13, 2024   2052 Will sign out to Dr Fam pending CTA chest.  If negative plan for d/c, SQ lovenox, outpt DVT study tomorrow       Medications   iohexol (OMNIPAQUE) 350 MG/ML injection (MULTI-DOSE) 100 mL (100 mL Intravenous Given 9/13/24 2047)       History of Present Illness       Patient with history of hypertension, diabetes, brought in for evaluation of left leg swelling.  Patient and family state approximately 2 months of left lower leg swelling.  Patient was previously treated for cellulitis of this area but no improvement.  Continues to have swelling.  Denies chest pain or shortness of breath.      History provided by:  Patient   used: No    Medical Problem  Location:   Left lower leg  Quality:  Swelling/redness  Severity:  Moderate  Onset quality:  Gradual  Duration:  2 months  Timing:  Constant  Progression:  Unchanged  Chronicity:  New  Relieved by:  Nothing  Worsened by:  Nothing  Associated symptoms: no abdominal pain, no chest pain, no cough, no diarrhea, no ear pain, no fever, no headaches, no loss of consciousness, no nausea, no rash, no shortness of breath, no sore throat, no vomiting and no wheezing            Review of Systems   Constitutional:  Negative for chills and fever.   HENT:  Negative for ear pain, hearing loss, sore throat, trouble swallowing and voice change.    Eyes:  Negative for pain and discharge.   Respiratory:  Negative for cough, shortness of breath and wheezing.    Cardiovascular:  Negative for chest pain and palpitations.   Gastrointestinal:  Negative for abdominal pain, blood in stool, constipation, diarrhea, nausea and vomiting.   Genitourinary:  Negative for dysuria, flank pain, frequency and hematuria.   Musculoskeletal:  Negative for joint swelling, neck pain and neck stiffness.   Skin:  Negative for rash and wound.   Neurological:  Negative for dizziness, seizures, loss of consciousness, syncope, facial asymmetry and headaches.   Psychiatric/Behavioral:  Negative for hallucinations, self-injury and suicidal ideas.    All other systems reviewed and are negative.          Objective     ED Triage Vitals [09/13/24 1942]   Temperature Pulse Blood Pressure Respirations SpO2 Patient Position - Orthostatic VS   97.9 °F (36.6 °C) 77 (!) 193/93 16 98 % Sitting      Temp Source Heart Rate Source BP Location FiO2 (%) Pain Score    Temporal Monitor Left arm -- 6        Physical Exam  Vitals and nursing note reviewed.   Constitutional:       General: He is not in acute distress.     Appearance: He is well-developed.   HENT:      Head: Normocephalic and atraumatic.      Right Ear: External ear normal.      Left Ear: External ear normal.   Eyes:      General:  No scleral icterus.        Right eye: No discharge.         Left eye: No discharge.      Extraocular Movements: Extraocular movements intact.      Conjunctiva/sclera: Conjunctivae normal.   Cardiovascular:      Rate and Rhythm: Normal rate and regular rhythm.      Heart sounds: Normal heart sounds. No murmur heard.  Pulmonary:      Effort: Pulmonary effort is normal.      Breath sounds: Normal breath sounds. No wheezing or rales.   Abdominal:      General: Bowel sounds are normal. There is no distension.      Palpations: Abdomen is soft.      Tenderness: There is no abdominal tenderness. There is no guarding or rebound.   Musculoskeletal:         General: No deformity. Normal range of motion.      Cervical back: Normal range of motion and neck supple.      Right lower leg: No edema.      Left lower leg: Edema present.      Comments: Left lower leg 3+ pitting edema.  Pulses are preserved.  The area is diffusely reddened but no significant warmth.  No drainage.   Skin:     General: Skin is warm and dry.      Findings: No rash.   Neurological:      General: No focal deficit present.      Mental Status: He is alert and oriented to person, place, and time.      Cranial Nerves: No cranial nerve deficit.   Psychiatric:         Mood and Affect: Mood normal.         Behavior: Behavior normal.         Thought Content: Thought content normal.         Judgment: Judgment normal.         Labs Reviewed   COMPREHENSIVE METABOLIC PANEL - Abnormal       Result Value    Sodium 134 (*)     Potassium 4.7      Chloride 103      CO2 26      ANION GAP 5      BUN 28 (*)     Creatinine 1.20      Glucose 166 (*)     Calcium 9.2      AST 20      ALT 17      Alkaline Phosphatase 83      Total Protein 6.8      Albumin 3.8      Total Bilirubin 0.84      eGFR 57      Narrative:     National Kid[employer] Foundation guidelines for Chronic Kidney Disease (CKD):     Stage 1 with normal or high GFR (GFR > 90 mL/min/1.73 square meters)    Stage 2 Mild  CKD (GFR = 60-89 mL/min/1.73 square meters)    Stage 3A Moderate CKD (GFR = 45-59 mL/min/1.73 square meters)    Stage 3B Moderate CKD (GFR = 30-44 mL/min/1.73 square meters)    Stage 4 Severe CKD (GFR = 15-29 mL/min/1.73 square meters)    Stage 5 End Stage CKD (GFR <15 mL/min/1.73 square meters)  Note: GFR calculation is accurate only with a steady state creatinine   D-DIMER, QUANTITATIVE - Abnormal    D-Dimer, Quant 1.12 (*)     Narrative:     In the evaluation for possible pulmonary embolism, in the appropriate (Well's Score of 4 or less) patient, the age adjusted d-dimer cutoff for this patient can be calculated as:    Age x 0.01 (in ug/mL) for Age-adjusted D-dimer exclusion threshold for a patient over 50 years.   B-TYPE NATRIURETIC PEPTIDE (BNP) - Normal    BNP 41     HS TROPONIN I 0HR - Normal    hs TnI 0hr 9     CBC AND DIFFERENTIAL    WBC 7.24      RBC 5.45      Hemoglobin 15.5      Hematocrit 46.5      MCV 85      MCH 28.4      MCHC 33.3      RDW 14.7      MPV 10.2      Platelets 180      nRBC 0      Segmented % 65      Immature Grans % 1      Lymphocytes % 21      Monocytes % 9      Eosinophils Relative 3      Basophils Relative 1      Absolute Neutrophils 4.76      Absolute Immature Grans 0.05      Absolute Lymphocytes 1.50      Absolute Monocytes 0.63      Eosinophils Absolute 0.24      Basophils Absolute 0.06     HS TROPONIN I 2HR   HS TROPONIN I 4HR     CTA ed chest pe study    (Results Pending)       ECG 12 Lead Documentation Only    Date/Time: 9/13/2024 7:46 PM    Performed by: Benedict Garcia MD  Authorized by: Benedict Garcia MD    ECG reviewed by me, the ED Provider: yes    Patient location:  ED  Previous ECG:     Previous ECG:  Unavailable  Interpretation:     Interpretation: non-specific    Rate:     ECG rate:  79    ECG rate assessment: normal    Rhythm:     Rhythm: sinus rhythm    Ectopy:     Ectopy: PAC and PVCs    QRS:     QRS axis:  Normal    QRS intervals:  Normal  Conduction:      Conduction: normal    ST segments:     ST segments:  Normal  T waves:     T waves: normal           Benedict Garcia MD  09/13/24 2100

## 2024-09-14 ENCOUNTER — HOSPITAL ENCOUNTER (OUTPATIENT)
Dept: NON INVASIVE DIAGNOSTICS | Facility: HOSPITAL | Age: 79
Discharge: HOME | End: 2024-09-14
Payer: COMMERCIAL

## 2024-09-14 DIAGNOSIS — M79.89 LEG SWELLING: ICD-10-CM

## 2024-09-14 DIAGNOSIS — R22.42 LOCALIZED SWELLING, MASS AND LUMP, LEFT LOWER LIMB: ICD-10-CM

## 2024-09-14 PROCEDURE — 93971 EXTREMITY STUDY: CPT

## 2024-09-14 NOTE — ED CARE HANDOFF
Emergency Department Sign Out Note        Sign out and transfer of care from Dr. Garcia. See Separate Emergency Department note.     The patient, Pal Ledbetter, was evaluated by the previous provider for leg swelling.    Workup Completed:  Labs, CT chest    ED Course / Workup Pending (followup):  CT chest read, dispo        2300: Signout received at change of shift.  CT and labs reviewed.  Patient presented with left lower extremity swelling.  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  The patient denies pain in the left lower extremity.  After hours, no ultrasonographer, plan to have patient return for venous duplex of the left lower extremity.  Bridging Lovenox dose giving.  Patient and family questions were answered.                               Procedures  Medical Decision Making  Amount and/or Complexity of Data Reviewed  Labs: ordered.  Radiology: ordered.    Risk  Prescription drug management.            Disposition  Final diagnoses:   Leg swelling   Localized swelling, mass and lump, left lower limb     Time reflects when diagnosis was documented in both MDM as applicable and the Disposition within this note       Time User Action Codes Description Comment    9/13/2024 10:59 PM Gage Fam Add [M79.89] Leg swelling     9/13/2024 11:00 PM Gage Fam Add [R22.42] Localized swelling, mass and lump, left lower limb           ED Disposition       ED Disposition   Discharge    Condition   Stable    Date/Time   Fri Sep 13, 2024 10:59 PM    Comment   Pal Ledbetter discharge to home/self care.                   Follow-up Information       Follow up With Specialties Details Why Contact Info    Joseph Rinaldi MD Family Medicine Schedule an appointment as soon as possible for a visit   [address] [facility] [address] [phone]            Patient's Medications   Discharge Prescriptions    No medications on file     Outpatient Discharge Orders   VAS VENOUS DUPLEX -LOWER LIMB  UNILATERAL   Standing Status: Future Standing Exp. Date: 09/13/28          ED Provider  Electronically Signed by     Gage Fam MD  09/13/24 6038

## 2024-09-15 PROCEDURE — 93971 EXTREMITY STUDY: CPT | Performed by: SURGERY

## 2024-09-16 LAB
ATRIAL RATE: 79 BPM
P AXIS: 8 DEGREES
PR INTERVAL: 208 MS
QRS AXIS: -59 DEGREES
QRSD INTERVAL: 140 MS
QT INTERVAL: 412 MS
QTC INTERVAL: 472 MS
T WAVE AXIS: 13 DEGREES
VENTRICULAR RATE: 79 BPM

## 2024-12-10 ENCOUNTER — HOSPITAL ENCOUNTER (EMERGENCY)
Facility: HOSPITAL | Age: 79
Discharge: HOME | End: 2024-12-10
Attending: EMERGENCY MEDICINE
Payer: COMMERCIAL

## 2024-12-10 VITALS
TEMPERATURE: 97.3 F | HEART RATE: 80 BPM | OXYGEN SATURATION: 98 % | WEIGHT: 255.73 LBS | BODY MASS INDEX: 34.68 KG/M2 | RESPIRATION RATE: 16 BRPM | SYSTOLIC BLOOD PRESSURE: 183 MMHG | DIASTOLIC BLOOD PRESSURE: 98 MMHG

## 2024-12-10 DIAGNOSIS — I87.8 VENOUS STASIS OF BOTH LOWER EXTREMITIES: Primary | ICD-10-CM

## 2024-12-10 DIAGNOSIS — L03.90 CELLULITIS: ICD-10-CM

## 2024-12-10 DIAGNOSIS — M79.89 PAIN AND SWELLING OF LEFT LOWER LEG: ICD-10-CM

## 2024-12-10 DIAGNOSIS — M79.662 PAIN AND SWELLING OF LEFT LOWER LEG: ICD-10-CM

## 2024-12-10 DIAGNOSIS — M79.89 LEFT LEG SWELLING: ICD-10-CM

## 2024-12-10 LAB
ALBUMIN SERPL BCG-MCNC: 3.8 G/DL (ref 3.5–5)
ALP SERPL-CCNC: 79 U/L (ref 34–104)
ALT SERPL W P-5'-P-CCNC: 20 U/L (ref 7–52)
ANION GAP SERPL CALCULATED.3IONS-SCNC: 7 MMOL/L (ref 4–13)
APTT PPP: 30 SECONDS (ref 23–34)
AST SERPL W P-5'-P-CCNC: 24 U/L (ref 13–39)
BASOPHILS # BLD AUTO: 0.05 THOUSANDS/ÂΜL (ref 0–0.1)
BASOPHILS NFR BLD AUTO: 1 % (ref 0–1)
BILIRUB SERPL-MCNC: 1.22 MG/DL (ref 0.2–1)
BUN SERPL-MCNC: 27 MG/DL (ref 5–25)
CALCIUM SERPL-MCNC: 9.1 MG/DL (ref 8.4–10.2)
CHLORIDE SERPL-SCNC: 102 MMOL/L (ref 96–108)
CO2 SERPL-SCNC: 27 MMOL/L (ref 21–32)
CREAT SERPL-MCNC: 1.15 MG/DL (ref 0.6–1.3)
CRP SERPL QL: 8.7 MG/L
EOSINOPHIL # BLD AUTO: 0.25 THOUSAND/ÂΜL (ref 0–0.61)
EOSINOPHIL NFR BLD AUTO: 3 % (ref 0–6)
ERYTHROCYTE [DISTWIDTH] IN BLOOD BY AUTOMATED COUNT: 15 % (ref 11.6–15.1)
GFR SERPL CREATININE-BSD FRML MDRD: 60 ML/MIN/1.73SQ M
GLUCOSE SERPL-MCNC: 95 MG/DL (ref 65–140)
HCT VFR BLD AUTO: 46.2 % (ref 36.5–49.3)
HGB BLD-MCNC: 15.2 G/DL (ref 12–17)
IMM GRANULOCYTES # BLD AUTO: 0.04 THOUSAND/UL (ref 0–0.2)
IMM GRANULOCYTES NFR BLD AUTO: 1 % (ref 0–2)
INR PPP: 0.98 (ref 0.85–1.19)
LACTATE SERPL-SCNC: 1.5 MMOL/L (ref 0.5–2)
LYMPHOCYTES # BLD AUTO: 1.13 THOUSANDS/ÂΜL (ref 0.6–4.47)
LYMPHOCYTES NFR BLD AUTO: 14 % (ref 14–44)
MCH RBC QN AUTO: 28.7 PG (ref 26.8–34.3)
MCHC RBC AUTO-ENTMCNC: 32.9 G/DL (ref 31.4–37.4)
MCV RBC AUTO: 87 FL (ref 82–98)
MONOCYTES # BLD AUTO: 0.62 THOUSAND/ÂΜL (ref 0.17–1.22)
MONOCYTES NFR BLD AUTO: 8 % (ref 4–12)
NEUTROPHILS # BLD AUTO: 5.94 THOUSANDS/ÂΜL (ref 1.85–7.62)
NEUTS SEG NFR BLD AUTO: 73 % (ref 43–75)
NRBC BLD AUTO-RTO: 0 /100 WBCS
PLATELET # BLD AUTO: 197 THOUSANDS/UL (ref 149–390)
PMV BLD AUTO: 10.4 FL (ref 8.9–12.7)
POTASSIUM SERPL-SCNC: 4.4 MMOL/L (ref 3.5–5.3)
PROT SERPL-MCNC: 6.6 G/DL (ref 6.4–8.4)
PROTHROMBIN TIME: 13.4 SECONDS (ref 12.3–15)
RBC # BLD AUTO: 5.29 MILLION/UL (ref 3.88–5.62)
SODIUM SERPL-SCNC: 136 MMOL/L (ref 135–147)
WBC # BLD AUTO: 8.03 THOUSAND/UL (ref 4.31–10.16)

## 2024-12-10 PROCEDURE — 36415 COLL VENOUS BLD VENIPUNCTURE: CPT | Performed by: EMERGENCY MEDICINE

## 2024-12-10 PROCEDURE — 85730 THROMBOPLASTIN TIME PARTIAL: CPT | Performed by: EMERGENCY MEDICINE

## 2024-12-10 PROCEDURE — 99284 EMERGENCY DEPT VISIT MOD MDM: CPT

## 2024-12-10 PROCEDURE — 86140 C-REACTIVE PROTEIN: CPT | Performed by: EMERGENCY MEDICINE

## 2024-12-10 PROCEDURE — 87040 BLOOD CULTURE FOR BACTERIA: CPT | Performed by: EMERGENCY MEDICINE

## 2024-12-10 PROCEDURE — 85025 COMPLETE CBC W/AUTO DIFF WBC: CPT | Performed by: EMERGENCY MEDICINE

## 2024-12-10 PROCEDURE — 83605 ASSAY OF LACTIC ACID: CPT | Performed by: EMERGENCY MEDICINE

## 2024-12-10 PROCEDURE — 85610 PROTHROMBIN TIME: CPT | Performed by: EMERGENCY MEDICINE

## 2024-12-10 PROCEDURE — 80053 COMPREHEN METABOLIC PANEL: CPT | Performed by: EMERGENCY MEDICINE

## 2024-12-10 RX ORDER — CEFADROXIL 500 MG/1
500 CAPSULE ORAL EVERY 12 HOURS SCHEDULED
Qty: 14 CAPSULE | Refills: 0 | Status: SHIPPED | OUTPATIENT
Start: 2024-12-10 | End: 2024-12-17

## 2024-12-10 RX ADMIN — CEPHALEXIN 1000 MG: 250 CAPSULE ORAL at 20:44

## 2024-12-11 NOTE — DISCHARGE INSTRUCTIONS
Return immediately if worse or any new symptoms  Tylenol 1000 mg every 6 hours as needed  and/or  Advil 400 mg every 6 hours as needed  May take both together    Please call  [facility] as soon as possible to arrange [phone] toll free    Monday to Friday: 7 am to 7 pm  Saturday: 8 am to 12 pm    [facility] Vascular Department [phone]

## 2024-12-11 NOTE — ED PROVIDER NOTES
Time reflects when diagnosis was documented in both MDM as applicable and the Disposition within this note       Time User Action Codes Description Comment    12/10/2024  8:21 PM Bryant Stokes Add [I87.8] Venous stasis of both lower extremities     12/10/2024  8:21 PM Bryant Stokes Add [M79.89] Left leg swelling     12/10/2024  8:21 PM Bryant Stokes Add [L03.90] Cellulitis     12/10/2024  8:27 PM Bryant Stokes Add [M79.662,  M79.89] Pain and swelling of left lower leg           ED Disposition       ED Disposition   Discharge    Condition   Stable    Date/Time   Tue Dec 10, 2024  8:20 PM    Comment   Pal Ledbetter discharge to home/self care.                   Assessment & Plan       Medical Decision Making  This patient presents with left foot redness.   Diagnostic considerations include cellulitis, sepsis, VTE. See ED Course.       Amount and/or Complexity of Data Reviewed  Labs: ordered. Decision-making details documented in ED Course.  ECG/medicine tests: ordered and independent interpretation performed. Decision-making details documented in ED Course.        ED Course as of 12/10/24 2030   Tue Dec 10, 2024   2014 C-REACTIVE PROTEIN(!): 8.7   2014 WBC: 8.03   2020 LACTIC ACID: 1.5   2028 We discussed results and chronic left lower extremity swelling similar to prior episode, agreeable with not anticoagulating, does not usually follow-up with podiatry and will discuss wound care versus podiatric follow-up with primary care clinician and return immediately if worse or additional symptoms of progressing cellulitis and left foot       Medications   cephalexin (KEFLEX) capsule 1,000 mg (has no administration in time range)       ED Risk Strat Scores                           SBIRT 22yo+      Flowsheet Row Most Recent Value   Initial Alcohol Screen: US AUDIT-C     1. How often do you have a drink containing alcohol? 0 Filed at: 12/10/2024 1399   2. How many drinks containing alcohol do you have on a  typical day you are drinking?  0 Filed at: 12/10/2024 1805   3a. Male UNDER 65: How often do you have five or more drinks on one occasion? 0 Filed at: 12/10/2024 1805   3b. FEMALE Any Age, or MALE 65+: How often do you have 4 or more drinks on one occassion? 0 Filed at: 12/10/2024 1805   Audit-C Score 0 Filed at: 12/10/2024 1805   JEFF: How many times in the past year have you...    Used an illegal drug or used a prescription medication for non-medical reasons? Never Filed at: 12/10/2024 1805                            History of Present Illness       Chief Complaint   Patient presents with    Leg Swelling     LEFT LOWER LEG SWELLING, PCP INSTRUCTED PT TO COME TO ED       Past Medical History:   Diagnosis Date    Diabetes mellitus (HCC)     Hypertension     L2 vertebral fracture (HCC)     Stricture esophagus     Stroke (cerebrum) (HCC) 08/29/2023      Past Surgical History:   Procedure Laterality Date    CATARACT EXTRACTION Bilateral     CHOLECYSTECTOMY      EGD        History reviewed. No pertinent family history.   Social History     Tobacco Use    Smoking status: Former    Smokeless tobacco: Never   Vaping Use    Vaping status: Never Used   Substance Use Topics    Alcohol use: Not Currently    Drug use: Never      E-Cigarette/Vaping    E-Cigarette Use Never User       E-Cigarette/Vaping Substances    Nicotine No     THC No     CBD No     Flavoring No     Other No     Unknown No       I have reviewed and agree with the history as documented.     79-year-old male accompanied by 2 sons states he feels well, denies any feet complaints currently, but yesterday had increasing redness in his left foot that has improved.  Chronic left lower extremity swelling has not changed, was recently evaluated for VTE and was negative, reviewed.  He denies fever and chills.  Patient and sons no chronic feet erythema, chronic venous issues, but does not utilize stockings as instructed.      History provided by:  Patient, caregiver  and relative  Medical Problem  Location:  Left foot  Quality:  Erythema, swelling  Onset quality:  Gradual  Timing:  Constant  Progression:  Worsening  Chronicity:  Chronic  Context:  Worsening  Relieved by:  Nothing  Ineffective treatments:  None  Associated symptoms: no abdominal pain, no chest pain, no fever and no shortness of breath        Review of Systems   Constitutional:  Negative for fever.   Respiratory:  Negative for shortness of breath.    Cardiovascular:  Negative for chest pain.   Gastrointestinal:  Negative for abdominal pain.   All other systems reviewed and are negative.          Objective       ED Triage Vitals [12/10/24 1803]   Temperature Pulse Blood Pressure Respirations SpO2 Patient Position - Orthostatic VS   (!) 97.3 °F (36.3 °C) 73 158/77 18 98 % Lying      Temp Source Heart Rate Source BP Location FiO2 (%) Pain Score    Temporal Monitor Right arm -- No Pain      Vitals      Date and Time Temp Pulse SpO2 Resp BP Pain Score FACES Pain Rating User   12/10/24 2000 -- 80 98 % 20 148/79 -- -- [facility]   12/10/24 1803 97.3 °F (36.3 °C) 73 98 % 18 158/77 No Pain -- MY            Physical Exam  Vitals and nursing note reviewed.   Constitutional:       General: He is not in acute distress.     Appearance: Normal appearance. He is obese. He is not ill-appearing or toxic-appearing.      Comments: Pleasant, comfortable appearing, conversational, articulate   HENT:      Head: Normocephalic and atraumatic.      Right Ear: External ear normal.      Left Ear: External ear normal.      Nose: Nose normal.      Mouth/Throat:      Mouth: Mucous membranes are moist.   Eyes:      Extraocular Movements: Extraocular movements intact.      Pupils: Pupils are equal, round, and reactive to light.   Cardiovascular:      Rate and Rhythm: Normal rate and regular rhythm.      Heart sounds: No murmur heard.  Pulmonary:      Effort: Pulmonary effort is normal. No respiratory distress.      Breath sounds: Normal breath sounds.  No wheezing or rales.   Abdominal:      General: Abdomen is flat. Bowel sounds are normal. There is no distension.      Tenderness: There is no abdominal tenderness. There is no guarding or rebound.   Musculoskeletal:      Cervical back: Neck supple.      Right lower leg: No edema.      Left lower leg: No edema.   Skin:     General: Skin is warm and dry.      Comments: Distal lower extremity erythema worse at most distal portions and plantar aspects, cool skin, hypertrophic fungated appearing toenails diffusely, dorsal pedal pulses 2+ bilaterally, left lower extremity markedly larger than right, legs and feet nontender bilaterally, no medial thigh tenderness bilaterally   Neurological:      General: No focal deficit present.      Mental Status: He is alert and oriented to person, place, and time.      Cranial Nerves: No cranial nerve deficit.      Sensory: No sensory deficit.      Motor: No weakness.      Coordination: Coordination normal.   Psychiatric:         Mood and Affect: Mood normal.         Behavior: Behavior normal.         Results Reviewed       Procedure Component Value Units Date/Time    Lactic acid, plasma (w/reflex if result > 2.0) [[id number]]  (Normal) Collected: 12/10/24 1931    Lab Status: Final result Specimen: Blood from Arm, Right Updated: 12/10/24 2018     LACTIC ACID 1.5 mmol/L     Narrative:      Result may be elevated if tourniquet was used during collection.    Comprehensive metabolic panel [[id number]]  (Abnormal) Collected: 12/10/24 1931    Lab Status: Final result Specimen: Blood from Arm, Right Updated: 12/10/24 1955     Sodium 136 mmol/L      Potassium 4.4 mmol/L      Chloride 102 mmol/L      CO2 27 mmol/L      ANION GAP 7 mmol/L      BUN 27 mg/dL      Creatinine 1.15 mg/dL      Glucose 95 mg/dL      Calcium 9.1 mg/dL      AST 24 U/L      ALT 20 U/L      Alkaline Phosphatase 79 U/L      Total Protein 6.6 g/dL      Albumin 3.8 g/dL      Total Bilirubin 1.22 mg/dL      eGFR 60  ml/min/1.73sq m     Narrative:      [employer] guidelines for Chronic Kidney Disease (CKD):     Stage 1 with normal or high GFR (GFR > 90 mL/min/1.73 square meters)    Stage 2 Mild CKD (GFR = 60-89 mL/min/1.73 square meters)    Stage 3A Moderate CKD (GFR = 45-59 mL/min/1.73 square meters)    Stage 3B Moderate CKD (GFR = 30-44 mL/min/1.73 square meters)    Stage 4 Severe CKD (GFR = 15-29 mL/min/1.73 square meters)    Stage 5 End Stage CKD (GFR <15 mL/min/1.73 square meters)  Note: GFR calculation is accurate only with a steady state creatinine    C-reactive protein [[id number]]  (Abnormal) Collected: 12/10/24 1931    Lab Status: Final result Specimen: Blood from Arm, Right Updated: 12/10/24 1955     CRP 8.7 mg/L     Protime-INR [[id number]]  (Normal) Collected: 12/10/24 1931    Lab Status: Final result Specimen: Blood from Arm, Right Updated: 12/10/24 1951     Protime 13.4 seconds      INR 0.98    Narrative:      INR Therapeutic Range    Indication                                             INR Range      Atrial Fibrillation                                               2.0-3.0  Hypercoagulable State                                    2.0.2.3  Left Ventricular Asist Device                            2.0-3.0  Mechanical Heart Valve                                  -    Aortic(with afib, MI, embolism, HF, LA enlargement,    and/or coagulopathy)                                     2.0-3.0 (2.5-3.5)     Mitral                                                             2.5-3.5  Prosthetic/Bioprosthetic Heart Valve               2.0-3.0  Venous thromboembolism (VTE: VT, PE        2.0-3.0    APTT [[id number]]  (Normal) Collected: 12/10/24 1931    Lab Status: Final result Specimen: Blood from Arm, Right Updated: 12/10/24 1951     PTT 30 seconds     CBC and differential [[id number]] Collected: 12/10/24 1931    Lab Status: Final result Specimen: Blood from Arm, Right Updated: 12/10/24 1938     WBC 8.03  Thousand/uL      RBC 5.29 Million/uL      Hemoglobin 15.2 g/dL      Hematocrit 46.2 %      MCV 87 fL      MCH 28.7 pg      MCHC 32.9 g/dL      RDW 15.0 %      MPV 10.4 fL      Platelets 197 Thousands/uL      nRBC 0 /100 WBCs      Segmented % 73 %      Immature Grans % 1 %      Lymphocytes % 14 %      Monocytes % 8 %      Eosinophils Relative 3 %      Basophils Relative 1 %      Absolute Neutrophils 5.94 Thousands/µL      Absolute Immature Grans 0.04 Thousand/uL      Absolute Lymphocytes 1.13 Thousands/µL      Absolute Monocytes 0.62 Thousand/µL      Eosinophils Absolute 0.25 Thousand/µL      Basophils Absolute 0.05 Thousands/µL     Blood culture #1 [[id number]] Collected: 12/10/24 1931    Lab Status: In process Specimen: Blood from Arm, Right Updated: 12/10/24 1936    Blood culture #2 [[id number]] Collected: 12/10/24 1931    Lab Status: In process Specimen: Blood from Arm, Right Updated: 12/10/24 1935            VAS VENOUS DUPLEX -LOWER LIMB UNILATERAL    (Results Pending)       Procedures    ED Medication and Procedure Management   Prior to Admission Medications   Prescriptions Last Dose Informant Patient Reported? Taking?   Insulin Pen Needle (B-D UF III MINI PEN NEEDLES) 31G X 5 MM MISC   Yes No   Sig: FOR USE WITH INSULIN PEN SYSTEM UP TO 7 TIMES A DAY AS DIRECTED Strength: 31G X 5 MM   OneTouch Delica Lancets 33G MISC   Yes No   aspirin (ECOTRIN LOW STRENGTH) 81 mg EC tablet   No No   Sig: Take 1 tablet (81 mg total) by mouth daily Do not start before September 6, 2023.   atorvastatin (LIPITOR) 40 mg tablet   No No   Sig: Take 1 tablet (40 mg total) by mouth daily with dinner   fluticasone (FLOVENT HFA) 220 mcg/act inhaler   No No   Sig: Inhale 2 puffs every 12 (twelve) hours Rinse mouth after use.   furosemide (LASIX) 20 mg tablet   Yes No   Sig: Take 20 mg by mouth daily   gemfibrozil (LOPID) 600 mg tablet   Yes No   glucose blood (FreeStyle Precision Dudley Test) test strip   Yes No   Sig: May use 1 strip daily  if needed for Freestyle Camilo 2 CGM system to monitor blood glucose   insulin degludec (Tresiba FlexTouch) 200 units/mL CONCENTRATED U-200 injection pen   Yes No   Sig: INJECT 70 U IN THE MORNING   insulin detemir (LEVEMIR) 100 units/mL subcutaneous injection   No No   Sig: Inject 35 Units under the skin every 12 (twelve) hours   insulin lispro (HumaLOG) 100 units/mL injection   No No   Sig: Inject 7 Units under the skin 3 (three) times a day with meals   insulin lispro (HumaLOG) 100 units/mL injection   No No   Sig: Inject 1-6 Units under the skin daily at bedtime   insulin lispro (HumaLOG) 100 units/mL injection   No No   Sig: Inject 2-12 Units under the skin 3 (three) times a day before meals   levothyroxine 112 mcg tablet   Yes No   Sig: Take 125 mcg by mouth in the morning   losartan (COZAAR) 50 mg tablet   Yes No   Sig: Take 1 tablet by mouth daily   omeprazole (PriLOSEC) 40 MG capsule   Yes No   Si twice daily for 1 month then 1 daily   pantoprazole (PROTONIX) 40 mg tablet   No No   Sig: Take 1 tablet (40 mg total) by mouth 2 (two) times a day before meals   verapamil (CALAN-SR) 120 mg CR tablet   No No   Sig: Take 1 tablet (120 mg total) by mouth daily Do not start before 2023.      Facility-Administered Medications: None     Patient's Medications   Discharge Prescriptions    CEFADROXIL (DURICEF) 500 MG CAPSULE    Take 1 capsule (500 mg total) by mouth every 12 (twelve) hours for 7 days       Start Date: 12/10/2024End Date: 2024       Order Dose: 500 mg       Quantity: 14 capsule    Refills: 0     Outpatient Discharge Orders   Ambulatory Referral to Wound Care   Standing Status: Future Standing Exp. Date: 12/10/25      Ambulatory Referral to Podiatry   Standing Status: Future Standing Exp. Date: 12/10/25      VAS VENOUS DUPLEX -LOWER LIMB UNILATERAL   Standing Status: Future Standing Exp. Date: 12/10/28     ED SEPSIS DOCUMENTATION   Time reflects when diagnosis was documented in both  MDM as applicable and the Disposition within this note       Time User Action Codes Description Comment    12/10/2024  8:21 PM Bryant Stokes [I87.8] Venous stasis of both lower extremities     12/10/2024  8:21 PM Bryant Stokes [M79.89] Left leg swelling     12/10/2024  8:21 PM Bryant Stokes [L03.90] Cellulitis     12/10/2024  8:27 PM Bryant Stokes [M79.662,  M79.89] Pain and swelling of left lower leg                  Bryant Stokes DO  12/10/24 2030

## 2024-12-14 ENCOUNTER — HOSPITAL ENCOUNTER (INPATIENT)
Facility: HOSPITAL | Age: 79
LOS: 2 days | Discharge: HOME HEALTH | DRG: 638 | End: 2024-12-16
Attending: EMERGENCY MEDICINE | Admitting: FAMILY MEDICINE
Payer: COMMERCIAL

## 2024-12-14 ENCOUNTER — APPOINTMENT (EMERGENCY)
Dept: RADIOLOGY | Facility: HOSPITAL | Age: 79
DRG: 638 | End: 2024-12-14
Payer: COMMERCIAL

## 2024-12-14 ENCOUNTER — APPOINTMENT (EMERGENCY)
Dept: CT IMAGING | Facility: HOSPITAL | Age: 79
DRG: 638 | End: 2024-12-14
Payer: COMMERCIAL

## 2024-12-14 DIAGNOSIS — R29.6 FREQUENT FALLS: Primary | ICD-10-CM

## 2024-12-14 DIAGNOSIS — I87.8 VENOUS STASIS: ICD-10-CM

## 2024-12-14 DIAGNOSIS — S91.109A OPEN WOUND OF TOE, INITIAL ENCOUNTER: ICD-10-CM

## 2024-12-14 DIAGNOSIS — R53.1 GENERALIZED WEAKNESS: ICD-10-CM

## 2024-12-14 DIAGNOSIS — R26.2 AMBULATORY DYSFUNCTION: ICD-10-CM
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
## 2024-12-14 PROBLEM — M79.89 LEG SWELLING: Status: ACTIVE | Noted: 2024-12-14

## 2024-12-14 PROBLEM — I10 HTN (HYPERTENSION): Status: ACTIVE | Noted: 2024-12-14

## 2024-12-14 LAB
2HR DELTA HS TROPONIN: 2 NG/L
ALBUMIN SERPL BCG-MCNC: 3.7 G/DL (ref 3.5–5)
ALP SERPL-CCNC: 83 U/L (ref 34–104)
ALT SERPL W P-5'-P-CCNC: 20 U/L (ref 7–52)
ANION GAP SERPL CALCULATED.3IONS-SCNC: 8 MMOL/L (ref 4–13)
AST SERPL W P-5'-P-CCNC: 23 U/L (ref 13–39)
BASOPHILS # BLD AUTO: 0.07 THOUSANDS/ÂΜL (ref 0–0.1)
BASOPHILS NFR BLD AUTO: 1 % (ref 0–1)
BILIRUB SERPL-MCNC: 1.32 MG/DL (ref 0.2–1)
BNP SERPL-MCNC: 28 PG/ML (ref 0–100)
BUN SERPL-MCNC: 25 MG/DL (ref 5–25)
CALCIUM SERPL-MCNC: 8.9 MG/DL (ref 8.4–10.2)
CARDIAC TROPONIN I PNL SERPL HS: 11 NG/L (ref ?–50)
CARDIAC TROPONIN I PNL SERPL HS: 9 NG/L (ref ?–50)
CHLORIDE SERPL-SCNC: 98 MMOL/L (ref 96–108)
CO2 SERPL-SCNC: 26 MMOL/L (ref 21–32)
CREAT SERPL-MCNC: 1.02 MG/DL (ref 0.6–1.3)
EOSINOPHIL # BLD AUTO: 0.21 THOUSAND/ÂΜL (ref 0–0.61)
EOSINOPHIL NFR BLD AUTO: 3 % (ref 0–6)
ERYTHROCYTE [DISTWIDTH] IN BLOOD BY AUTOMATED COUNT: 14.6 % (ref 11.6–15.1)
GFR SERPL CREATININE-BSD FRML MDRD: 69 ML/MIN/1.73SQ M
GLUCOSE SERPL-MCNC: 104 MG/DL (ref 65–140)
GLUCOSE SERPL-MCNC: 123 MG/DL (ref 65–140)
GLUCOSE SERPL-MCNC: 163 MG/DL (ref 65–140)
HCT VFR BLD AUTO: 45.5 % (ref 36.5–49.3)
HGB BLD-MCNC: 14.9 G/DL (ref 12–17)
IMM GRANULOCYTES # BLD AUTO: 0.02 THOUSAND/UL (ref 0–0.2)
IMM GRANULOCYTES NFR BLD AUTO: 0 % (ref 0–2)
LYMPHOCYTES # BLD AUTO: 1.15 THOUSANDS/ÂΜL (ref 0.6–4.47)
LYMPHOCYTES NFR BLD AUTO: 15 % (ref 14–44)
MCH RBC QN AUTO: 28.2 PG (ref 26.8–34.3)
MCHC RBC AUTO-ENTMCNC: 32.7 G/DL (ref 31.4–37.4)
MCV RBC AUTO: 86 FL (ref 82–98)
MONOCYTES # BLD AUTO: 0.76 THOUSAND/ÂΜL (ref 0.17–1.22)
MONOCYTES NFR BLD AUTO: 10 % (ref 4–12)
NEUTROPHILS # BLD AUTO: 5.28 THOUSANDS/ÂΜL (ref 1.85–7.62)
NEUTS SEG NFR BLD AUTO: 71 % (ref 43–75)
NRBC BLD AUTO-RTO: 0 /100 WBCS
PLATELET # BLD AUTO: 190 THOUSANDS/UL (ref 149–390)
PMV BLD AUTO: 9.6 FL (ref 8.9–12.7)
POTASSIUM SERPL-SCNC: 4.4 MMOL/L (ref 3.5–5.3)
PROT SERPL-MCNC: 6.6 G/DL (ref 6.4–8.4)
RBC # BLD AUTO: 5.28 MILLION/UL (ref 3.88–5.62)
SODIUM SERPL-SCNC: 132 MMOL/L (ref 135–147)
WBC # BLD AUTO: 7.49 THOUSAND/UL (ref 4.31–10.16)

## 2024-12-14 PROCEDURE — 93005 ELECTROCARDIOGRAM TRACING: CPT

## 2024-12-14 PROCEDURE — 83880 ASSAY OF NATRIURETIC PEPTIDE: CPT | Performed by: EMERGENCY MEDICINE

## 2024-12-14 PROCEDURE — 82948 REAGENT STRIP/BLOOD GLUCOSE: CPT

## 2024-12-14 PROCEDURE — 99285 EMERGENCY DEPT VISIT HI MDM: CPT | Performed by: EMERGENCY MEDICINE

## 2024-12-14 PROCEDURE — 71045 X-RAY EXAM CHEST 1 VIEW: CPT

## 2024-12-14 PROCEDURE — 80053 COMPREHEN METABOLIC PANEL: CPT | Performed by: EMERGENCY MEDICINE

## 2024-12-14 PROCEDURE — 36415 COLL VENOUS BLD VENIPUNCTURE: CPT | Performed by: EMERGENCY MEDICINE

## 2024-12-14 PROCEDURE — 84484 ASSAY OF TROPONIN QUANT: CPT | Performed by: EMERGENCY MEDICINE

## 2024-12-14 PROCEDURE — 99285 EMERGENCY DEPT VISIT HI MDM: CPT

## 2024-12-14 PROCEDURE — 99223 1ST HOSP IP/OBS HIGH 75: CPT | Performed by: FAMILY MEDICINE

## 2024-12-14 PROCEDURE — 85025 COMPLETE CBC W/AUTO DIFF WBC: CPT | Performed by: EMERGENCY MEDICINE

## 2024-12-14 PROCEDURE — 90662 IIV NO PRSV INCREASED AG IM: CPT | Performed by: FAMILY MEDICINE

## 2024-12-14 PROCEDURE — G0008 ADMIN INFLUENZA VIRUS VAC: HCPCS | Performed by: FAMILY MEDICINE

## 2024-12-14 PROCEDURE — 70450 CT HEAD/BRAIN W/O DYE: CPT

## 2024-12-14 RX ORDER — LEVOTHYROXINE SODIUM 125 UG/1
125 TABLET ORAL
Status: DISCONTINUED | OUTPATIENT
Start: 2024-12-15 | End: 2024-12-16 | Stop reason: HOSPADM

## 2024-12-14 RX ORDER — INSULIN LISPRO 100 [IU]/ML
2-12 INJECTION, SOLUTION INTRAVENOUS; SUBCUTANEOUS
Status: DISCONTINUED | OUTPATIENT
Start: 2024-12-14 | End: 2024-12-16 | Stop reason: HOSPADM

## 2024-12-14 RX ORDER — HEPARIN SODIUM 5000 [USP'U]/ML
5000 INJECTION, SOLUTION INTRAVENOUS; SUBCUTANEOUS EVERY 8 HOURS SCHEDULED
Status: DISCONTINUED | OUTPATIENT
Start: 2024-12-14 | End: 2024-12-16 | Stop reason: HOSPADM

## 2024-12-14 RX ORDER — LOSARTAN POTASSIUM 50 MG/1
50 TABLET ORAL DAILY
Status: DISCONTINUED | OUTPATIENT
Start: 2024-12-15 | End: 2024-12-16 | Stop reason: HOSPADM

## 2024-12-14 RX ORDER — NYSTATIN 100000 [USP'U]/G
POWDER TOPICAL 2 TIMES DAILY
Status: DISCONTINUED | OUTPATIENT
Start: 2024-12-14 | End: 2024-12-16 | Stop reason: HOSPADM

## 2024-12-14 RX ORDER — PANTOPRAZOLE SODIUM 40 MG/1
40 TABLET, DELAYED RELEASE ORAL
Status: DISCONTINUED | OUTPATIENT
Start: 2024-12-15 | End: 2024-12-16 | Stop reason: HOSPADM

## 2024-12-14 RX ORDER — INSULIN GLARGINE 100 [IU]/ML
70 INJECTION, SOLUTION SUBCUTANEOUS EVERY MORNING
Status: DISCONTINUED | OUTPATIENT
Start: 2024-12-15 | End: 2024-12-15

## 2024-12-14 RX ORDER — INSULIN LISPRO 100 [IU]/ML
1 INJECTION, SOLUTION INTRAVENOUS; SUBCUTANEOUS
COMMUNITY

## 2024-12-14 RX ORDER — FUROSEMIDE 10 MG/ML
20 INJECTION INTRAMUSCULAR; INTRAVENOUS ONCE
Status: COMPLETED | OUTPATIENT
Start: 2024-12-14 | End: 2024-12-14

## 2024-12-14 RX ORDER — ACETAMINOPHEN 325 MG/1
650 TABLET ORAL EVERY 6 HOURS PRN
Status: DISCONTINUED | OUTPATIENT
Start: 2024-12-14 | End: 2024-12-16 | Stop reason: HOSPADM

## 2024-12-14 RX ORDER — CEFAZOLIN SODIUM 2 G/50ML
2000 SOLUTION INTRAVENOUS EVERY 8 HOURS
Status: DISCONTINUED | OUTPATIENT
Start: 2024-12-14 | End: 2024-12-16 | Stop reason: HOSPADM

## 2024-12-14 RX ORDER — ATORVASTATIN CALCIUM 40 MG/1
40 TABLET, FILM COATED ORAL
Status: DISCONTINUED | OUTPATIENT
Start: 2024-12-14 | End: 2024-12-16 | Stop reason: HOSPADM

## 2024-12-14 RX ADMIN — NYSTATIN: 100000 POWDER TOPICAL at 21:40

## 2024-12-14 RX ADMIN — ATORVASTATIN CALCIUM 40 MG: 40 TABLET, FILM COATED ORAL at 18:46

## 2024-12-14 RX ADMIN — CEFAZOLIN SODIUM 2000 MG: 2 SOLUTION INTRAVENOUS at 18:47

## 2024-12-14 RX ADMIN — HEPARIN SODIUM 5000 UNITS: 5000 INJECTION, SOLUTION INTRAVENOUS; SUBCUTANEOUS at 21:40

## 2024-12-14 RX ADMIN — HEPARIN SODIUM 5000 UNITS: 5000 INJECTION, SOLUTION INTRAVENOUS; SUBCUTANEOUS at 18:46

## 2024-12-14 RX ADMIN — INFLUENZA A VIRUS A/VICTORIA/4897/2022 IVR-238 (H1N1) ANTIGEN (FORMALDEHYDE INACTIVATED), INFLUENZA A VIRUS A/CALIFORNIA/122/2022 SAN-022 (H3N2) ANTIGEN (FORMALDEHYDE INACTIVATED), AND INFLUENZA B VIRUS B/MICHIGAN/01/2021 ANTIGEN (FORMALDEHYDE INACTIVATED) 0.5 ML: 60; 60; 60 INJECTION, SUSPENSION INTRAMUSCULAR at 18:47

## 2024-12-14 RX ADMIN — FUROSEMIDE 20 MG: 10 INJECTION, SOLUTION INTRAMUSCULAR; INTRAVENOUS at 18:47

## 2024-12-14 NOTE — ASSESSMENT & PLAN NOTE
Left > right left has been for months previous had venous duplex and was negative  No open wounds to suggest osteo  Some shin cellulitis  Cold feet hard to feel pulses  Has PVD - will obtain arterial duplex/venous duplex rule out dvt  Will give lasix 20 mg iv x1  Tsh   Echo   Will need wraps when dvt ruled out  and leg elevation  Ancef for cellulitis

## 2024-12-14 NOTE — ASSESSMENT & PLAN NOTE
"Lab Results   Component Value Date    HGBA1C 7 (H) 09/19/2024       No results for input(s): \"POCGLU\" in the last 72 hours.    Blood Sugar Average: Last 72 hrs:  on tresiba 70 units in am at home resume formulary lantus  iss    "

## 2024-12-14 NOTE — ED PROVIDER NOTES
Time reflects when diagnosis was documented in both MDM as applicable and the Disposition within this note       Time User Action Codes Description Comment    12/14/2024  5:03 PM Johnny Lowry [R29.6] Frequent falls     12/14/2024  5:03 PM Johnny Lowry [R53.1] Generalized weakness     12/14/2024  5:03 PM Johnny Lowry Add [R26.2] Ambulatory dysfunction     12/14/2024  5:04 PM Johnny Lowry [I87.8] Venous stasis           ED Disposition       ED Disposition   Admit    Condition   Stable    Date/Time   Sat Dec 14, 2024  5:03 PM    Comment                  Assessment & Plan       Medical Decision Making  Patient presented to the emergency department and a MSE was performed. The patient was evaluated for complaint related to acute fatigue.  Patient is potentially at risk for, but not limited to, dehydration, electrolyte abnormality, anxiety, infectious process such as pneumonia or urinary tract infection, cardiac arrhythmia, myocardial infarction, or medication effect/compliance.  Several of these diagnoses have been evaluated and ruled out by history and physical.  As needed, patient will be further evaluated with laboratory and imaging studies.  Higher level diagnostics, such as CT imaging or ultrasound, may also be required.  Please see work-up portion of the note for further evaluation of patient's risk.  Socioeconomic factors were also considered as part of the decision-making process.  Unless otherwise stated in the chart or patient is admitted as elsewhere documented, any previously prescribed medications will be maintained.      Problems Addressed:  Ambulatory dysfunction: chronic illness or injury  Frequent falls: chronic illness or injury  Generalized weakness: chronic illness or injury  Venous stasis: chronic illness or injury    Amount and/or Complexity of Data Reviewed  Labs: ordered.  Radiology: ordered and independent interpretation performed.    Risk  Decision regarding  hospitalization.  Risk Details: Patient presented to the emergency department and a MSE was performed. The patient was evaluated and diagnosed with acute exacerbation of ambulatory dysfunction, weakness and, frequent falls.This is an acute exacerbation of a chronic disease process that will require additional planned work-up and treatment in a hospitalized setting. As may have been required as part of this evaluation, clinical laboratory test, radiology imaging and medical testing (I.e. EKG) were ordered as necessitated by the patient's presentation. I independently reviewed these studies, imaging and testing. This patient's case is considered to be a considerable risk secondary to the above listed disease process and poses a threat to the patient's well-being and baseline function. Further in-patient diagnostic testing and management, which may include the administration of parenteral medications, is required.               ED Course as of 24 1704   Sat Dec 14, 2024   1532 Family now present at bedside.  They report that the patient has had increased forgetfulness.  Reports that they have noticed the patient going to the door and looking for his  wife.  He is also forgetting the name of his dog for 11 years.  He has had increasing falls.    Discussed with the family the possibility of inpatient rehab and they are agreeable to that type of placement.  Will likely require admission for same.   165 CT of the head is negative.     1704 Discussed with medicine and admitted to their service.       Medications - No data to display    ED Risk Strat Scores                          SBIRT 22yo+      Flowsheet Row Most Recent Value   Initial Alcohol Screen: US AUDIT-C     1. How often do you have a drink containing alcohol? 0 Filed at: 2024 1521   2. How many drinks containing alcohol do you have on a typical day you are drinking?  0 Filed at: 2024 1521   3b. FEMALE Any Age, or MALE 65+: How often do  "you have 4 or more drinks on one occassion? 0 Filed at: 12/14/2024 1521   Audit-C Score 0 Filed at: 12/14/2024 1521   JEFF: How many times in the past year have you...    Used an illegal drug or used a prescription medication for non-medical reasons? Never Filed at: 12/14/2024 1521                            History of Present Illness       Chief Complaint   Patient presents with    Multiple Falls     Pt arrives from home via EMS with c/o multiple falls recently due to not using his cane. Pt recently seen at other hospital for same. Left foot discolored, cold with pitting edema for \"awhile\"        Past Medical History:   Diagnosis Date    Diabetes mellitus (HCC)     Hypertension     L2 vertebral fracture (HCC)     Stricture esophagus     Stroke (cerebrum) (HCC) 08/29/2023      Past Surgical History:   Procedure Laterality Date    CATARACT EXTRACTION Bilateral     CHOLECYSTECTOMY      EGD        History reviewed. No pertinent family history.   Social History     Tobacco Use    Smoking status: Former    Smokeless tobacco: Never   Vaping Use    Vaping status: Never Used   Substance Use Topics    Alcohol use: Not Currently    Drug use: Never      E-Cigarette/Vaping    E-Cigarette Use Never User       E-Cigarette/Vaping Substances    Nicotine No     THC No     CBD No     Flavoring No     Other No     Unknown No       I have reviewed and agree with the history as documented.     Patient is a 79-year-old male who after just being seen on the 10th of this month in our department for generalized weakness and left lower leg edema is returning secondary to frequent falls.  Patient denies any injury other than an abrasion of the left great toe.  Patient denies any chest pain or shortness of breath.  He was accompanied to the emergency room by his 2 sons on the 10th.  He has recently been evaluated for venous thromboembolism which was negative.  He has a repeat study ordered.  Currently resting comfortably.      History provided " by:  Patient      Review of Systems   Constitutional:  Negative for fever.   Respiratory:  Negative for shortness of breath and wheezing.    Cardiovascular:  Positive for leg swelling. Negative for chest pain and palpitations.   Gastrointestinal:  Negative for diarrhea, nausea and vomiting.   Skin:  Positive for wound.   Neurological:  Positive for weakness. Negative for dizziness.                     Objective       ED Triage Vitals   Temperature Pulse Blood Pressure Respirations SpO2 Patient Position - Orthostatic VS   12/14/24 1504 12/14/24 1504 12/14/24 1507 12/14/24 1504 12/14/24 1504 12/14/24 1607   97.7 °F (36.5 °C) 65 161/82 16 93 % Lying      Temp Source Heart Rate Source BP Location FiO2 (%) Pain Score    12/14/24 1504 12/14/24 1504 12/14/24 1607 -- --    Temporal Monitor Right arm        Vitals      Date and Time Temp Pulse SpO2 Resp BP Pain Score FACES Pain Rating User   12/14/24 1700 -- 72 98 % 17 180/88 -- -- SL   12/14/24 1607 -- 66 97 % 18 159/83 -- -- SL   12/14/24 1507 -- -- -- -- 161/82 -- -- MD   12/14/24 1504 97.7 °F (36.5 °C) 65 93 % 16 -- -- -- MD            Physical Exam  Vitals (BMI is greater than 35) and nursing note reviewed.   Constitutional:       General: He is not in acute distress.     Appearance: He is not toxic-appearing.   HENT:      Head: Normocephalic.      Right Ear: External ear normal.      Left Ear: External ear normal.      Nose: Nose normal.      Mouth/Throat:      Pharynx: No oropharyngeal exudate.   Eyes:      Pupils: Pupils are equal, round, and reactive to light.   Cardiovascular:      Rate and Rhythm: Normal rate.      Pulses: No decreased pulses.           Dorsalis pedis pulses are detected w/ Doppler on the right side and detected w/ Doppler on the left side.      Heart sounds: No murmur heard.     Comments: See clinical images below.  Patient has left greater than right lower extremity edema with venous stasis changes.    Capillary refill is diminished.  Pulmonary:       Effort: No respiratory distress.      Breath sounds: No wheezing or rales.   Abdominal:      General: There is no distension.   Musculoskeletal:      Right lower leg: Edema present.      Left lower leg: Edema present.   Neurological:      General: No focal deficit present.      Mental Status: He is alert.   Psychiatric:         Thought Content: Thought content normal.         Judgment: Judgment normal.         Results Reviewed       Procedure Component Value Units Date/Time    HS Troponin I 4hr [[id number]]     Lab Status: No result Specimen: Blood     B-Type Natriuretic Peptide(BNP) [[id number]]  (Normal) Collected: 12/14/24 1520    Lab Status: Final result Specimen: Blood from Arm, Left Updated: 12/14/24 1557     BNP 28 pg/mL     HS Troponin 0hr (reflex protocol) [[id number]]  (Normal) Collected: 12/14/24 1520    Lab Status: Final result Specimen: Blood from Arm, Left Updated: 12/14/24 1552     hs TnI 0hr 9 ng/L     HS Troponin I 2hr [[id number]]     Lab Status: No result Specimen: Blood     Comprehensive metabolic panel [[id number]]  (Abnormal) Collected: 12/14/24 1520    Lab Status: Final result Specimen: Blood from Arm, Left Updated: 12/14/24 1548     Sodium 132 mmol/L      Potassium 4.4 mmol/L      Chloride 98 mmol/L      CO2 26 mmol/L      ANION GAP 8 mmol/L      BUN 25 mg/dL      Creatinine 1.02 mg/dL      Glucose 123 mg/dL      Calcium 8.9 mg/dL      AST 23 U/L      ALT 20 U/L      Alkaline Phosphatase 83 U/L      Total Protein 6.6 g/dL      Albumin 3.7 g/dL      Total Bilirubin 1.32 mg/dL      eGFR 69 ml/min/1.73sq m     Narrative:      National Kid[employer] guidelines for Chronic Kidney Disease (CKD):     Stage 1 with normal or high GFR (GFR > 90 mL/min/1.73 square meters)    Stage 2 Mild CKD (GFR = 60-89 mL/min/1.73 square meters)    Stage 3A Moderate CKD (GFR = 45-59 mL/min/1.73 square meters)    Stage 3B Moderate CKD (GFR = 30-44 mL/min/1.73 square meters)    Stage 4 Severe CKD (GFR =  15-29 mL/min/1.73 square meters)    Stage 5 End Stage CKD (GFR <15 mL/min/1.73 square meters)  Note: GFR calculation is accurate only with a steady state creatinine    CBC and differential [[id number]] Collected: 12/14/24 1520    Lab Status: Final result Specimen: Blood from Arm, Left Updated: 12/14/24 1527     WBC 7.49 Thousand/uL      RBC 5.28 Million/uL      Hemoglobin 14.9 g/dL      Hematocrit 45.5 %      MCV 86 fL      MCH 28.2 pg      MCHC 32.7 g/dL      RDW 14.6 %      MPV 9.6 fL      Platelets 190 Thousands/uL      nRBC 0 /100 WBCs      Segmented % 71 %      Immature Grans % 0 %      Lymphocytes % 15 %      Monocytes % 10 %      Eosinophils Relative 3 %      Basophils Relative 1 %      Absolute Neutrophils 5.28 Thousands/µL      Absolute Immature Grans 0.02 Thousand/uL      Absolute Lymphocytes 1.15 Thousands/µL      Absolute Monocytes 0.76 Thousand/µL      Eosinophils Absolute 0.21 Thousand/µL      Basophils Absolute 0.07 Thousands/µL             CT head without contrast   Final Interpretation by Cruz Molina MD (12/14 [id number])      No acute intracranial abnormality.                  Workstation performed: [id number]         XR chest 1 view portable   ED Interpretation by Johnny Lowry DO (12/14 [id number])   Elevated left hemidiaphragm          ECG 12 Lead Documentation Only    Date/Time: 12/14/2024 3:38 PM    Performed by: Johnny Lowry DO  Authorized by: Johnny Lowry DO    ECG reviewed by me, the ED Provider: yes    Patient location:  ED  Interpretation:     Interpretation: non-specific    Rate:     ECG rate assessment: normal    Rhythm:     Rhythm: sinus rhythm    Ectopy:     Ectopy: PAC    QRS:     QRS axis:  Normal  Conduction:     Conduction: normal    ST segments:     ST segments:  Non-specific  T waves:     T waves: non-specific    Comments:      Nonischemic EKG      ED Medication and Procedure Management   Prior to Admission Medications   Prescriptions Last Dose Informant Patient Reported?  Taking?   Insulin Pen Needle (B-D UF III MINI PEN NEEDLES) 31G X 5 MM MISC   Yes No   Sig: FOR USE WITH INSULIN PEN SYSTEM UP TO 7 TIMES A DAY AS DIRECTED Strength: 31G X 5 MM   OneTouch Delica Lancets 33G MISC   Yes No   aspirin (ECOTRIN LOW STRENGTH) 81 mg EC tablet   No No   Sig: Take 1 tablet (81 mg total) by mouth daily Do not start before 2023.   atorvastatin (LIPITOR) 40 mg tablet   No No   Sig: Take 1 tablet (40 mg total) by mouth daily with dinner   cefadroxil (DURICEF) 500 mg capsule   No No   Sig: Take 1 capsule (500 mg total) by mouth every 12 (twelve) hours for 7 days   fluticasone (FLOVENT HFA) 220 mcg/act inhaler   No No   Sig: Inhale 2 puffs every 12 (twelve) hours Rinse mouth after use.   furosemide (LASIX) 20 mg tablet   Yes No   Sig: Take 20 mg by mouth daily   gemfibrozil (LOPID) 600 mg tablet   Yes No   glucose blood (FreeStyle Precision Dudley Test) test strip   Yes No   Sig: May use 1 strip daily if needed for Freestyle Camilo 2 CGM system to monitor blood glucose   insulin degludec (Tresiba FlexTouch) 200 units/mL CONCENTRATED U-200 injection pen   Yes No   Sig: INJECT 70 U IN THE MORNING   insulin detemir (LEVEMIR) 100 units/mL subcutaneous injection   No No   Sig: Inject 35 Units under the skin every 12 (twelve) hours   insulin lispro (HumaLOG) 100 units/mL injection   No No   Sig: Inject 7 Units under the skin 3 (three) times a day with meals   insulin lispro (HumaLOG) 100 units/mL injection   No No   Sig: Inject 1-6 Units under the skin daily at bedtime   insulin lispro (HumaLOG) 100 units/mL injection   No No   Sig: Inject 2-12 Units under the skin 3 (three) times a day before meals   levothyroxine 112 mcg tablet   Yes No   Sig: Take 125 mcg by mouth in the morning   losartan (COZAAR) 50 mg tablet   Yes No   Sig: Take 1 tablet by mouth daily   omeprazole (PriLOSEC) 40 MG capsule   Yes No   Si twice daily for 1 month then 1 daily   pantoprazole (PROTONIX) 40 mg tablet   No No    Sig: Take 1 tablet (40 mg total) by mouth 2 (two) times a day before meals   verapamil (CALAN-SR) 120 mg CR tablet   No No   Sig: Take 1 tablet (120 mg total) by mouth daily Do not start before September 6, 2023.      Facility-Administered Medications: None     Patient's Medications   Discharge Prescriptions    No medications on file     No discharge procedures on file.  ED SEPSIS DOCUMENTATION   Time reflects when diagnosis was documented in both MDM as applicable and the Disposition within this note       Time User Action Codes Description Comment    12/14/2024  5:03 PM Johnny Lowry [R29.6] Frequent falls     12/14/2024  5:03 PM Johnny Lowry [R53.1] Generalized weakness     12/14/2024  5:03 PM Johnny Lowry [R26.2] Ambulatory dysfunction     12/14/2024  5:04 PM Johnny Lowry [I87.8] Venous stasis                  Johnny Lowry, DO  12/14/24 [id number]

## 2024-12-14 NOTE — H&P
"H&P - Hospitalist   Name: Pal Ledbetter 79 y.o. male I MRN: [id number]  Unit/Bed#: [facility] I Date of Admission: 12/14/2024   Date of Service: 12/14/2024 I Hospital Day: 0     Assessment & Plan  Fall  Recurrent falls  Sons think 2/2 to leg swelling  May need rehab  Pt/ot  Imaging /labs ok   Type 2 diabetes mellitus with hyperglycemia, with long-term current use of insulin (HCC)  Lab Results   Component Value Date    HGBA1C 7 (H) 09/19/2024       No results for input(s): \"POCGLU\" in the last 72 hours.    Blood Sugar Average: Last 72 hrs:  on tresiba 70 units in am at home resume formulary lantus  iss    Leg swelling  Left > right left has been for months previous had venous duplex and was negative  No open wounds to suggest osteo  Some shin cellulitis  Cold feet hard to feel pulses  Has PVD - will obtain arterial duplex/venous duplex rule out dvt  Will give lasix 20 mg iv x1  Tsh   Echo   Will need wraps when dvt ruled out  and leg elevation  Ancef for cellulitis   HTN (hypertension)  Continue losartan       VTE Pharmacologic Prophylaxis: VTE Score: 3 Moderate Risk (Score 3-4) - Pharmacological DVT Prophylaxis Ordered: heparin.  Code Status: full code   Discussion with family: Updated  (son) at bedside.    Anticipated Length of Stay: Patient will be admitted on an inpatient basis with an anticipated length of stay of greater than 2 midnights secondary to falls .    History of Present Illness   Chief Complaint: falls     Pal Ledbetter is a 79 y.o. male with a PMH of hx of cva  who presents with falls , and has had long term issues with left leg swelling.left leg swelling on and off but also right swelling. No cp ro sob. Has been at times forgetful. Has brush burn from fall on left shin and was given abx on 12/10 when came to er.      Review of Systems   Constitutional:  Negative for chills and fever.   HENT:  Negative for ear pain and sore throat.    Eyes:  Negative for pain and visual disturbance. "   Respiratory:  Negative for cough and shortness of breath.    Cardiovascular:  Positive for leg swelling. Negative for chest pain and palpitations.   Gastrointestinal:  Negative for abdominal pain and vomiting.   Genitourinary:  Negative for dysuria and hematuria.   Musculoskeletal:  Negative for arthralgias and back pain.   Skin:  Negative for color change and rash.   Neurological:  Negative for seizures and syncope.   All other systems reviewed and are negative.      Historical Information   Past Medical History:   Diagnosis Date    Diabetes mellitus (HCC)     Hypertension     L2 vertebral fracture (HCC)     Stricture esophagus     Stroke (cerebrum) (HCC) 08/29/2023     Past Surgical History:   Procedure Laterality Date    CATARACT EXTRACTION Bilateral     CHOLECYSTECTOMY      EGD       Social History     Tobacco Use    Smoking status: Former    Smokeless tobacco: Never   Vaping Use    Vaping status: Never Used   Substance and Sexual Activity    Alcohol use: Not Currently    Drug use: Never    Sexual activity: Not on file     E-Cigarette/Vaping    E-Cigarette Use Never User      E-Cigarette/Vaping Substances    Nicotine No     THC No     CBD No     Flavoring No     Other No     Unknown No      History reviewed. No pertinent family history.  Social History:  Marital Status:      Meds/Allergies   I have reviewed home medications with patient family member.  Prior to Admission medications    Medication Sig Start Date End Date Taking? Authorizing Provider   aspirin (ECOTRIN LOW STRENGTH) 81 mg EC tablet Take 1 tablet (81 mg total) by mouth daily Do not start before September 6, 2023. 9/6/23   Leeann Valdez MD   atorvastatin (LIPITOR) 40 mg tablet Take 1 tablet (40 mg total) by mouth daily with dinner 9/5/23   Leeann Valdez MD   cefadroxil (DURICEF) 500 mg capsule Take 1 capsule (500 mg total) by mouth every 12 (twelve) hours for 7 days 12/10/24 12/17/24  Bryant Stokes DO   fluticasone (FLOVENT HFA) 220  mcg/act inhaler Inhale 2 puffs every 12 (twelve) hours Rinse mouth after use. 9/5/23   Leeann Valdez MD   furosemide (LASIX) 20 mg tablet Take 20 mg by mouth daily 10/3/23   Historical Provider, MD   gemfibrozil (LOPID) 600 mg tablet     Historical Provider, MD   glucose blood (FreeStyle Precision Dudley Test) test strip May use 1 strip daily if needed for Freestyle Camilo 2 CGM system to monitor blood glucose 5/9/23   Historical Provider, MD   insulin degludec (Tresiba FlexTouch) 200 units/mL CONCENTRATED U-200 injection pen INJECT 70 U IN THE MORNING 10/2/23   Historical Provider, MD   insulin detemir (LEVEMIR) 100 units/mL subcutaneous injection Inject 35 Units under the skin every 12 (twelve) hours 9/5/23   Leeann Valdez MD   insulin lispro (HumaLOG) 100 units/mL injection Inject 7 Units under the skin 3 (three) times a day with meals 9/5/23   Leeann Valdez MD   insulin lispro (HumaLOG) 100 units/mL injection Inject 1-6 Units under the skin daily at bedtime 9/5/23   Leeann Valdez MD   insulin lispro (HumaLOG) 100 units/mL injection Inject 2-12 Units under the skin 3 (three) times a day before meals 9/5/23   Leeann Valdez MD   Insulin Pen Needle (B-D UF III MINI PEN NEEDLES) 31G X 5 MM MISC FOR USE WITH INSULIN PEN SYSTEM UP TO 7 TIMES A DAY AS DIRECTED Strength: 31G X 5 MM 5/4/23   Historical Provider, MD   levothyroxine 112 mcg tablet Take 125 mcg by mouth in the morning    Historical Provider, MD   losartan (COZAAR) 50 mg tablet Take 1 tablet by mouth daily 12/19/22   Historical Provider, MD   omeprazole (PriLOSEC) 40 MG capsule 1 twice daily for 1 month then 1 daily 9/22/23   Historical Provider, MD   OneTouch Delica Lancets 33G MISC     Historical Provider, MD   pantoprazole (PROTONIX) 40 mg tablet Take 1 tablet (40 mg total) by mouth 2 (two) times a day before meals 9/5/23   Leeann Valdez MD   verapamil (CALAN-SR) 120 mg CR tablet Take 1 tablet (120 mg total) by mouth daily Do not start before September 6, 2023.  9/6/23   Leeann Valdez MD     No Known Allergies    Objective :  Temp:  [97.7 °F (36.5 °C)] 97.7 °F (36.5 °C)  HR:  [65-72] 72  BP: (159-180)/(82-88) 180/88  Resp:  [16-18] 17  SpO2:  [93 %-98 %] 98 %  O2 Device: None (Room air)    Physical Exam  Vitals and nursing note reviewed.   Constitutional:       General: He is not in acute distress.     Appearance: He is well-developed.   HENT:      Head: Normocephalic and atraumatic.   Eyes:      Conjunctiva/sclera: Conjunctivae normal.   Cardiovascular:      Rate and Rhythm: Normal rate and regular rhythm.      Heart sounds: No murmur heard.  Pulmonary:      Effort: Pulmonary effort is normal. No respiratory distress.      Breath sounds: Normal breath sounds. No wheezing or rales.   Abdominal:      General: There is no distension.      Palpations: Abdomen is soft.      Tenderness: There is no abdominal tenderness.   Musculoskeletal:         General: Swelling present.      Cervical back: Neck supple.      Comments: Feet are cold    Skin:     General: Skin is warm and dry.      Capillary Refill: Capillary refill takes less than 2 seconds.      Findings: Erythema present.   Neurological:      Mental Status: He is alert. Mental status is at baseline.   Psychiatric:         Mood and Affect: Mood normal.                    Lines/Drains:            Lab Results: I have reviewed the following results:  Results from last 7 days   Lab Units 12/14/24  1520   WBC Thousand/uL 7.49   HEMOGLOBIN g/dL 14.9   HEMATOCRIT % 45.5   PLATELETS Thousands/uL 190   SEGS PCT % 71   LYMPHO PCT % 15   MONO PCT % 10   EOS PCT % 3     Results from last 7 days   Lab Units 12/14/24  1520   SODIUM mmol/L 132*   POTASSIUM mmol/L 4.4   CHLORIDE mmol/L 98   CO2 mmol/L 26   BUN mg/dL 25   CREATININE mg/dL 1.02   ANION GAP mmol/L 8   CALCIUM mg/dL 8.9   ALBUMIN g/dL 3.7   TOTAL BILIRUBIN mg/dL 1.32*   ALK PHOS U/L 83   ALT U/L 20   AST U/L 23   GLUCOSE RANDOM mg/dL 123     Results from last 7 days   Lab Units  12/10/24  1931   INR  0.98         Lab Results   Component Value Date    HGBA1C 7 (H) 09/19/2024    HGBA1C 7.4 (H) 02/12/2024    HGBA1C 8.4 (H) 08/29/2023     Results from last 7 days   Lab Units 12/10/24  1931   LACTIC ACID mmol/L 1.5       Imaging Results Review: I reviewed radiology reports from this admission including: chest xray and CT head.  Other Study Results Review: EKG was reviewed.     Administrative Statements   I have spent a total time of >45 minutes in caring for this patient on the day of the visit/encounter including Patient and family education, Documenting in the medical record, Reviewing / ordering tests, medicine, procedures  , Obtaining or reviewing history  , and Communicating with other healthcare professionals .    ** Please Note: This note has been constructed using a voice recognition system. **

## 2024-12-15 LAB
ANION GAP SERPL CALCULATED.3IONS-SCNC: 5 MMOL/L (ref 4–13)
ATRIAL RATE: 67 BPM
BACTERIA BLD CULT: NORMAL
BACTERIA BLD CULT: NORMAL
BUN SERPL-MCNC: 25 MG/DL (ref 5–25)
CALCIUM SERPL-MCNC: 8.7 MG/DL (ref 8.4–10.2)
CHLORIDE SERPL-SCNC: 101 MMOL/L (ref 96–108)
CO2 SERPL-SCNC: 27 MMOL/L (ref 21–32)
CREAT SERPL-MCNC: 1.15 MG/DL (ref 0.6–1.3)
GFR SERPL CREATININE-BSD FRML MDRD: 60 ML/MIN/1.73SQ M
GLUCOSE SERPL-MCNC: 157 MG/DL (ref 65–140)
GLUCOSE SERPL-MCNC: 225 MG/DL (ref 65–140)
GLUCOSE SERPL-MCNC: 71 MG/DL (ref 65–140)
GLUCOSE SERPL-MCNC: 80 MG/DL (ref 65–140)
GLUCOSE SERPL-MCNC: 82 MG/DL (ref 65–140)
P AXIS: 88 DEGREES
POTASSIUM SERPL-SCNC: 4 MMOL/L (ref 3.5–5.3)
PR INTERVAL: 206 MS
QRS AXIS: 66 DEGREES
QRSD INTERVAL: 142 MS
QT INTERVAL: 434 MS
QTC INTERVAL: 458 MS
SODIUM SERPL-SCNC: 133 MMOL/L (ref 135–147)
T WAVE AXIS: 36 DEGREES
TSH SERPL DL<=0.05 MIU/L-ACNC: 4.19 UIU/ML (ref 0.45–4.5)
VENTRICULAR RATE: 67 BPM

## 2024-12-15 PROCEDURE — 99232 SBSQ HOSP IP/OBS MODERATE 35: CPT | Performed by: FAMILY MEDICINE

## 2024-12-15 PROCEDURE — 97116 GAIT TRAINING THERAPY: CPT

## 2024-12-15 PROCEDURE — 93010 ELECTROCARDIOGRAM REPORT: CPT | Performed by: INTERNAL MEDICINE

## 2024-12-15 PROCEDURE — 84443 ASSAY THYROID STIM HORMONE: CPT | Performed by: FAMILY MEDICINE

## 2024-12-15 PROCEDURE — 82948 REAGENT STRIP/BLOOD GLUCOSE: CPT

## 2024-12-15 PROCEDURE — 80048 BASIC METABOLIC PNL TOTAL CA: CPT | Performed by: FAMILY MEDICINE

## 2024-12-15 PROCEDURE — 97163 PT EVAL HIGH COMPLEX 45 MIN: CPT

## 2024-12-15 RX ORDER — ASPIRIN 81 MG/1
81 TABLET ORAL DAILY
Status: DISCONTINUED | OUTPATIENT
Start: 2024-12-15 | End: 2024-12-16 | Stop reason: HOSPADM

## 2024-12-15 RX ORDER — FUROSEMIDE 10 MG/ML
20 INJECTION INTRAMUSCULAR; INTRAVENOUS ONCE
Status: COMPLETED | OUTPATIENT
Start: 2024-12-15 | End: 2024-12-15

## 2024-12-15 RX ORDER — INSULIN GLARGINE 100 [IU]/ML
30 INJECTION, SOLUTION SUBCUTANEOUS EVERY MORNING
Status: DISCONTINUED | OUTPATIENT
Start: 2024-12-15 | End: 2024-12-16 | Stop reason: HOSPADM

## 2024-12-15 RX ADMIN — ATORVASTATIN CALCIUM 40 MG: 40 TABLET, FILM COATED ORAL at 16:27

## 2024-12-15 RX ADMIN — NYSTATIN: 100000 POWDER TOPICAL at 17:41

## 2024-12-15 RX ADMIN — LEVOTHYROXINE SODIUM 125 MCG: 125 TABLET ORAL at 04:38

## 2024-12-15 RX ADMIN — CEFAZOLIN SODIUM 2000 MG: 2 SOLUTION INTRAVENOUS at 10:11

## 2024-12-15 RX ADMIN — CEFAZOLIN SODIUM 2000 MG: 2 SOLUTION INTRAVENOUS at 02:00

## 2024-12-15 RX ADMIN — HEPARIN SODIUM 5000 UNITS: 5000 INJECTION, SOLUTION INTRAVENOUS; SUBCUTANEOUS at 04:38

## 2024-12-15 RX ADMIN — PANTOPRAZOLE SODIUM 40 MG: 40 TABLET, DELAYED RELEASE ORAL at 04:39

## 2024-12-15 RX ADMIN — INSULIN LISPRO 2 UNITS: 100 INJECTION, SOLUTION INTRAVENOUS; SUBCUTANEOUS at 16:28

## 2024-12-15 RX ADMIN — ASPIRIN 81 MG: 81 TABLET, COATED ORAL at 08:26

## 2024-12-15 RX ADMIN — FUROSEMIDE 20 MG: 10 INJECTION, SOLUTION INTRAMUSCULAR; INTRAVENOUS at 10:11

## 2024-12-15 RX ADMIN — LOSARTAN POTASSIUM 50 MG: 50 TABLET, FILM COATED ORAL at 08:26

## 2024-12-15 RX ADMIN — HEPARIN SODIUM 5000 UNITS: 5000 INJECTION, SOLUTION INTRAVENOUS; SUBCUTANEOUS at 20:46

## 2024-12-15 RX ADMIN — CEFAZOLIN SODIUM 2000 MG: 2 SOLUTION INTRAVENOUS at 17:43

## 2024-12-15 RX ADMIN — INSULIN LISPRO 4 UNITS: 100 INJECTION, SOLUTION INTRAVENOUS; SUBCUTANEOUS at 12:12

## 2024-12-15 RX ADMIN — INSULIN GLARGINE 30 UNITS: 100 INJECTION, SOLUTION SUBCUTANEOUS at 10:11

## 2024-12-15 RX ADMIN — NYSTATIN: 100000 POWDER TOPICAL at 10:11

## 2024-12-15 RX ADMIN — HEPARIN SODIUM 5000 UNITS: 5000 INJECTION, SOLUTION INTRAVENOUS; SUBCUTANEOUS at 14:32

## 2024-12-15 NOTE — PHYSICAL THERAPY NOTE
PHYSICAL THERAPY EVALUATION  NAME:  Pal Ledbetter  DATE: 12/15/24    AGE:   79 y.o.  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     Length Of Stay: 1  Performed at least 2 patient identifiers during session: Name and Birthday  PHYSICAL THERAPY EVALUATION :      12/15/24 0709   PT Last Visit   PT Visit Date 12/15/24   Note Type   Note type Evaluation   Pain Assessment   Pain Assessment Tool FLACC   Pain Location/Orientation Orientation: Lower;Location: Back   Pain Onset/Description Frequency: Constant/Continuous   Pain Rating: FLACC (Rest) - Face 0   Pain Rating: FLACC (Rest) - Legs 0   Pain Rating: FLACC (Rest) - Activity 0   Pain Rating: FLACC (Rest) - Cry 1   Pain Rating: FLACC (Rest) - Consolability 0   Score: FLACC (Rest) 1   Pain Rating: FLACC (Activity) - Face 1   Pain Rating: FLACC (Activity) - Legs 0   Pain Rating: FLACC (Activity) - Activity 0   Pain Rating: FLACC (Activity) - Cry 1   Pain Rating: FLACC (Activity) - Consolability 0   Score: FLACC (Activity) 2   Restrictions/Precautions   Other Precautions Cognitive;Chair Alarm;Bed Alarm;Fall Risk;Pain   Home Living   Type of Home House  (12 YOLANDA B HRs, but only able to use one side at a time)   Home Layout Two level;Bed/bath upstairs;1/2 bath on main level  (has been sleeping on 1st floor at times, stair glide to 2nd floor full bathroom)   Bathroom Shower/Tub Walk-in shower   Bathroom Toilet Raised   Bathroom Equipment Shower chair   Home Equipment Cane;Walker  (doesn't use an AD PTA but reports he will use a walker or a cane at times.)   Additional Comments poor historian. information from evaluation 8/30/2023. Per the  "notes he had been using his late wife's walker at times at that time last year.   Prior Function   Level of Christian Independent with ADLs;Independent with functional mobility;Needs assistance with IADLS   Lives With Alone  (pt reports living with his spouse. per family patient has been going to the door and looking for his  wife.)   Receives Help From Family   IADLs Family/Friend/Other provides transportation;Family/Friend/Other provides meals;Family/Friend/Other provides medication management  (pt reports he is driving.)   Falls in the last 6 months 1 to 4  (\"at least 2\")   Comments Pt poor historian. Reports being independent with mobility, ADLs and driving. Will need to confirm with CM if home set up and LOF changed from last year.   General   Additional Pertinent History inc B LE edema left > right, and greater at right foot, impaired skin integrity B legs   Cognition   Orientation Level Oriented to person;Oriented to place;Disoriented to situation  (partially to time with month, not year)   Following Commands Follows one step commands with increased time or repetition   Comments pt difficult to understand at times with garbled speech   Subjective   Subjective \"I don't use anything, but sometimes I use the walker.\"   RUE Assessment   RUE Assessment WFL  (except hip flexion > 90 deg. strength 4-/5 ankles/ 4/5 knees and 3-/5 hips)   LUE Assessment   LUE Assessment WFL  (except hip flexion > 90 deg. strength 4-/5 ankles/ 4/5 knees and 3-/5 hips)   Light Touch   RLE Light Touch   (difficult to assess due to cognition with decreased understanding)   LLE Light Touch   (difficult to assess due to cognition with decreased understanding)   Bed Mobility   Supine to Sit 3  Moderate assistance   Additional items Assist x 1;Increased time required;Verbal cues;LE management  (trunk management)   Additional Comments HOB elevated ~ 10 degrees. inc time and effort to complete with manual cues for trunk and LE " management.   Transfers   Sit to Stand 4  Minimal assistance   Additional items Assist x 1;Increased time required;Verbal cues   Stand to Sit 3  Moderate assistance   Additional items Assist x 1;Increased time required;Verbal cues   Stand pivot 2  Maximal assistance   Additional items Assist x 1;Increased time required;Verbal cues   Additional Comments no AD. sit to stand with minAx1 wiht inc time to alexandra askew, cues for uprihgt posture. spt with maxAx1 wiht manual cues for wt shifting and verbal cues for tunring completely prior to sitting. pt reaching for external support. modAx1 for controlled descent ot sit.   Ambulation/Elevation   Gait pattern Wide NAOMY;Decreased foot clearance;Shuffling   Gait Assistance 3  Moderate assist   Additional items Assist x 1;Verbal cues   Assistive Device None   Distance ambulated 3'x1 without AD but patient reaching for external support throughout with short shuffled steps, decreased stance right LE, inc trunk flexion.   Balance   Static Sitting Fair   Dynamic Sitting Fair -   Static Standing Poor +   Dynamic Standing Poor   Ambulatory Poor   Endurance Deficit   Endurance Deficit   (fatigue)   Activity Tolerance   Activity Tolerance Patient limited by fatigue   Nurse Made Aware Dior RODAS   Assessment   Prognosis Good   Problem List Decreased strength;Decreased endurance;Impaired balance;Decreased mobility;Decreased cognition;Decreased safety awareness;Impaired judgement;Obesity;Decreased skin integrity;Pain   Barriers to Discharge Decreased caregiver support;Inaccessible home environment   Barriers to Discharge Comments requires assistance to complete mobility, recent falls, increased fall risk, safety concerns with impaired cognition   Goals   Patient Goals get better   STG Expiration Date 12/29/24   PT Treatment Day 1   Plan   Treatment/Interventions ADL retraining;Functional transfer training;LE strengthening/ROM;Elevations;Therapeutic exercise;Endurance  training;Patient/family training;Equipment eval/education;Bed mobility;Gait training;Compensatory technique education;Spoke to nursing;Spoke to case management   PT Frequency 3-5x/wk   Discharge Recommendation   Rehab Resource Intensity Level, PT II (Moderate Resource Intensity)  (PAR)   AM-PAC Basic Mobility Inpatient   Turning in Flat Bed Without Bedrails 2   Lying on Back to Sitting on Edge of Flat Bed Without Bedrails 2   Moving Bed to Chair 3   Standing Up From Chair Using Arms 3   Walk in Room 3   Climb 3-5 Stairs With Railing 1   Basic Mobility Inpatient Raw Score 14   Basic Mobility Standardized Score 35.55   [facility] Highest Level Of Mobility   -[facility] Goal 4: Move to chair/commode   -HL Achieved 7: Walk 25 feet or more   Additional Treatment Session   Start Time 0731   End Time 0741   Treatment Assessment Pt tolerated session fairly despite pain. he was able to ambulate and transfer with decreased assistance with RW compared to no AD. He requires increased cues for saftey and improved tehcnique with mobility. He is limited by decreased stength, balance, endruance and pain and impaired cognition. He will continue to benefit from PT services to maximize LOF.   Equipment Use initiated use of RW. min verbal cues for hand placement for safety. inc time to ahcieve standing. sit<>stand with minAx1 wiht cues for posture and controlled descent to sit. spt with RW with miNAx1 with verbal cues for turning completely and staying wihtin NAOMY of RW. ambulated 44'x1 with RW with minAx1 with wide NAOMY, inc trunk flexion, decreased step length, foot clearance, step to pattern with dec stance right LE with decreased left step length with pt c/o pain. verbal cues to stay wihtin NAOMY fo RW.   End of Consult   Patient Position at End of Consult Bedside chair;Bed/Chair alarm activated;All needs within reach         (Please find full objective findings from PT assessment regarding body systems outlined above).     Assessment:  Pt is a 79 y.o. male seen for PT evaluation s/p admission to [facility] on 12/14/2024 with Fall.  Order placed for PT services.  Upon evaluation: Pt is presenting with impaired functional mobility due to pain, decreased strength, decreased endurance, impaired balance, gait deviations, impaired cognition, decreased safety awareness, impaired judgment, fall risk, LE edema, and impaired skin integrity requiring  moderate assistance for bed mobility, minimal to maximal assistance for transfers, and moderate assistance for ambulation with out AD . Pt's clinical presentation is currently unpredictable given the functional mobility deficits above, especially weakness, edema of extremities, decreased skin integrity, decreased endurance, impaired balance, gait deviations, pain, decreased activity tolerance, decreased functional mobility tolerance, decreased safety awareness, impaired judgement, and decreased cognition, coupled with fall risks as indicated by AM-PAC 6-Clicks: 14/24 as well as hx of falls, impaired balance, polypharmacy, impaired judgement, decreased safety awareness, decreased cognition, and obesity and combined with medical complications of abnormal blood sugars, abnormal sodium values, and need for input for mobility technique/safety, recent ED visit 12/10/24 due to LE edema.  Pt's PMHx and comorbidities that may affect physical performance and progress include: CVA (Small acute juxtacortical white matter infarct within the right posterior frontal lobe August 2023), DM, HTN, obesity, and L2 vertebral fracture. Personal factors affecting pt at time of IE include: step(s) to enter environment, limited home support, inability to perform IADLs, inability to perform ADLs, inability to navigate level surfaces without external assistance, inability to ambulate household distances, limited insight into impairments, and recent fall(s)/fall history. Pt will benefit from continued skilled PT  services to address deficits as defined above and to maximize level of functional mobility to facilitate return toward PLOF and improved QOL. From PT/mobility standpoint, recommendation at time of d/c would be Level II (Moderate Resource Intensity in order to reduce fall risk and maximize pt's functional independence and consistency with mobility. Recommend trial with walker next 1-2 sessions and ther ex next 1-2 sessions.       The patient's AM-PAC Basic Mobility Inpatient Short Form Raw Score is 14. A Raw score of less than or equal to 16 suggests the patient may benefit from discharge to post-acute rehabilitation services. Please also refer to the recommendation of the Physical Therapist for safe discharge planning.       Goals: Pt will: Perform bed mobility tasks with supervision to reposition in bed and prepare for transfers. Pt will perform transfers with supervision to decrease burden of care, decrease risk for falls, and improve activity tolerance and prepare for ambulation. Pt will ambulate with LRAD for >/= 150' with  supervision  to decrease burden of care, decrease risk for falls, improve activity tolerance, and improve gait quality and to access home environment. Pt will complete 1 step with LRAD and >/= 12 steps with unilateral handrail with steadying assistance to decrease burden of care, return to home with YOLANDA, decrease risk for falls, and improve activity tolerance. Pt will participate in objective balance assessment to determine baseline fall risk. Pt will participate in SSWS assessment to determine level of mobility. Pt will increase B LE strength >/= 1/2 MMT grade to facilitate functional mobility.      Brissa Varma, PT,DPT

## 2024-12-15 NOTE — PLAN OF CARE
Problem: PAIN - ADULT  Goal: Verbalizes/displays adequate comfort level or baseline comfort level  Description: Interventions:  - Encourage patient to monitor pain and request assistance  - Assess pain using appropriate pain scale  - Administer analgesics based on type and severity of pain and evaluate response  - Implement non-pharmacological measures as appropriate and evaluate response  - Consider cultural and social influences on pain and pain management  - Notify physician/advanced practitioner if interventions unsuccessful or patient reports new pain  Outcome: Progressing     Problem: INFECTION - ADULT  Goal: Absence or prevention of progression during hospitalization  Description: INTERVENTIONS:  - Assess and monitor for signs and symptoms of infection  - Monitor lab/diagnostic results  - Monitor all insertion sites, i.e. indwelling lines, tubes, and drains  - Monitor endotracheal if appropriate and nasal secretions for changes in amount and color  - [place] appropriate cooling/warming therapies per order  - Administer medications as ordered  - Instruct and encourage patient and family to use good hand hygiene technique  - Identify and instruct in appropriate isolation precautions for identified infection/condition  Outcome: Progressing  Goal: Absence of fever/infection during neutropenic period  Description: INTERVENTIONS:  - Monitor WBC    Outcome: Progressing     Problem: SAFETY ADULT  Goal: Patient will remain free of falls  Description: INTERVENTIONS:  - Educate patient/family on patient safety including physical limitations  - Instruct patient to call for assistance with activity   - Consult OT/PT to assist with strengthening/mobility   - Keep Call bell within reach  - Keep bed low and locked with side rails adjusted as appropriate  - Keep care items and personal belongings within reach  - Initiate and maintain comfort rounds  - Make Fall Risk Sign visible to staff  - Offer Toileting every 2 Hours,  in advance of need  - Initiate/Maintain alarm  - Obtain necessary fall risk management equipment  - Apply yellow socks and bracelet for high fall risk patients  - Consider moving patient to room near nurses station  Outcome: Progressing  Goal: Maintain or return to baseline ADL function  Description: INTERVENTIONS:  -  Assess patient's ability to carry out ADLs; assess patient's baseline for ADL function and identify physical deficits which impact ability to perform ADLs (bathing, care of mouth/teeth, toileting, grooming, dressing, etc.)  - Assess/evaluate cause of self-care deficits   - Assess range of motion  - Assess patient's mobility; develop plan if impaired  - Assess patient's need for assistive devices and provide as appropriate  - Encourage maximum independence but intervene and supervise when necessary  - Involve family in performance of ADLs  - Assess for home care needs following discharge   - Consider OT consult to assist with ADL evaluation and planning for discharge  - Provide patient education as appropriate  Outcome: Progressing  Goal: Maintains/Returns to pre admission functional level  Description: INTERVENTIONS:  - Perform AM-PAC 6 Click Basic Mobility/ Daily Activity assessment daily.  - Set and communicate daily mobility goal to care team and patient/family/caregiver.   - Collaborate with rehabilitation services on mobility goals if consulted  - Perform Range of Motion 3 times a day.  - Reposition patient every 2 hours.  - Dangle patient 3 times a day  - Stand patient 3 times a day  - Ambulate patient 3 times a day  - Out of bed to chair 3 times a day   - Out of bed for meals 3 times a day  - Out of bed for toileting  - Record patient progress and toleration of activity level   Outcome: Progressing     Problem: DISCHARGE PLANNING  Goal: Discharge to home or other facility with appropriate resources  Description: INTERVENTIONS:  - Identify barriers to discharge w/patient and caregiver  - Arrange  for needed discharge resources and transportation as appropriate  - Identify discharge learning needs (meds, wound care, etc.)  - Arrange for interpretive services to assist at discharge as needed  - Refer to Case Management Department for coordinating discharge planning if the patient needs post-hospital services based on physician/advanced practitioner order or complex needs related to functional status, cognitive ability, or social support system  Outcome: Progressing     Problem: Knowledge Deficit  Goal: Patient/family/caregiver demonstrates understanding of disease process, treatment plan, medications, and discharge instructions  Description: Complete learning assessment and assess knowledge base.  Interventions:  - Provide teaching at level of understanding  - Provide teaching via preferred learning methods  Outcome: Progressing     Problem: NEUROSENSORY - ADULT  Goal: Achieves stable or improved neurological status  Description: INTERVENTIONS  - Monitor and report changes in neurological status  - Monitor vital signs such as temperature, blood pressure, glucose, and any other labs ordered   - Initiate measures to prevent increased intracranial pressure  - Monitor for seizure activity and implement precautions if appropriate      Outcome: Progressing  Goal: Remains free of injury related to seizures activity  Description: INTERVENTIONS  - Maintain airway, patient safety  and administer oxygen as ordered  - Monitor patient for seizure activity, document and report duration and description of seizure to physician/advanced practitioner  - If seizure occurs,  ensure patient safety during seizure  - Reorient patient post seizure  - Seizure pads on all 4 side rails  - Instruct patient/family to notify RN of any seizure activity including if an aura is experienced  - Instruct patient/family to call for assistance with activity based on nursing assessment  - Administer anti-seizure medications if ordered    Outcome:  Progressing  Goal: Achieves maximal functionality and self care  Description: INTERVENTIONS  - Monitor swallowing and airway patency with patient fatigue and changes in neurological status  - Encourage and assist patient to increase activity and self care.   - Encourage visually impaired, hearing impaired and aphasic patients to use assistive/communication devices  Outcome: Progressing     Problem: CARDIOVASCULAR - ADULT  Goal: Maintains optimal cardiac output and hemodynamic stability  Description: INTERVENTIONS:  - Monitor I/O, vital signs and rhythm  - Monitor for S/S and trends of decreased cardiac output  - Administer and titrate ordered vasoactive medications to optimize hemodynamic stability  - Assess quality of pulses, skin color and temperature  - Assess for signs of decreased coronary artery perfusion  - Instruct patient to report change in severity of symptoms  Outcome: Progressing  Goal: Absence of cardiac dysrhythmias or at baseline rhythm  Description: INTERVENTIONS:  - Continuous cardiac monitoring, vital signs, obtain 12 lead EKG if ordered  - Administer antiarrhythmic and heart rate control medications as ordered  - Monitor electrolytes and administer replacement therapy as ordered  Outcome: Progressing     Problem: RESPIRATORY - ADULT  Goal: Achieves optimal ventilation and oxygenation  Description: INTERVENTIONS:  - Assess for changes in respiratory status  - Assess for changes in mentation and behavior  - Position to facilitate oxygenation and minimize respiratory effort  - Oxygen administered by appropriate delivery if ordered  - Initiate smoking cessation education as indicated  - Encourage broncho-pulmonary hygiene including cough, deep breathe, Incentive Spirometry  - Assess the need for suctioning and aspirate as needed  - Assess and instruct to report SOB or any respiratory difficulty  - Respiratory Therapy support as indicated  Outcome: Progressing     Problem: GASTROINTESTINAL -  ADULT  Goal: Minimal or absence of nausea and/or vomiting  Description: INTERVENTIONS:  - Administer IV fluids if ordered to ensure adequate hydration  - Maintain NPO status until nausea and vomiting are resolved  - Nasogastric tube if ordered  - Administer ordered antiemetic medications as needed  - Provide nonpharmacologic comfort measures as appropriate  - Advance diet as tolerated, if ordered  - Consider nutrition services referral to assist patient with adequate nutrition and appropriate food choices  Outcome: Progressing  Goal: Maintains or returns to baseline bowel function  Description: INTERVENTIONS:  - Assess bowel function  - Encourage oral fluids to ensure adequate hydration  - Administer IV fluids if ordered to ensure adequate hydration  - Administer ordered medications as needed  - Encourage mobilization and activity  - Consider nutritional services referral to assist patient with adequate nutrition and appropriate food choices  Outcome: Progressing  Goal: Maintains adequate nutritional intake  Description: INTERVENTIONS:  - Monitor percentage of each meal consumed  - Identify factors contributing to decreased intake, treat as appropriate  - Assist with meals as needed  - Monitor I&O, weight, and lab values if indicated  - Obtain nutrition services referral as needed  Outcome: Progressing  Goal: Establish and maintain optimal ostomy function  Description: INTERVENTIONS:  - Assess bowel function  - Encourage oral fluids to ensure adequate hydration  - Administer IV fluids if ordered to ensure adequate hydration   - Administer ordered medications as needed  - Encourage mobilization and activity  - Nutrition services referral to assist patient with appropriate food choices  - Assess stoma site  - Consider wound care consult   Outcome: Progressing  Goal: Oral mucous membranes remain intact  Description: INTERVENTIONS  - Assess oral mucosa and hygiene practices  - Implement preventative oral hygiene  regimen  - Implement oral medicated treatments as ordered  - Initiate Nutrition services referral as needed  Outcome: Progressing     Problem: GENITOURINARY - ADULT  Goal: Maintains or returns to baseline urinary function  Description: INTERVENTIONS:  - Assess urinary function  - Encourage oral fluids to ensure adequate hydration if ordered  - Administer IV fluids as ordered to ensure adequate hydration  - Administer ordered medications as needed  - Offer frequent toileting  - Follow urinary retention protocol if ordered  Outcome: Progressing  Goal: Absence of urinary retention  Description: INTERVENTIONS:  - Assess patient’s ability to void and empty bladder  - Monitor I/O  - Bladder scan as needed  - Discuss with physician/AP medications to alleviate retention as needed  - Discuss catheterization for long term situations as appropriate  Outcome: Progressing  Goal: Urinary catheter remains patent  Description: INTERVENTIONS:  - Assess patency of urinary catheter  - If patient has a chronic nguyen, consider changing catheter if non-functioning  - Follow guidelines for intermittent irrigation of non-functioning urinary catheter  Outcome: Progressing     Problem: METABOLIC, FLUID AND ELECTROLYTES - ADULT  Goal: Electrolytes maintained within normal limits  Description: INTERVENTIONS:  - Monitor labs and assess patient for signs and symptoms of electrolyte imbalances  - Administer electrolyte replacement as ordered  - Monitor response to electrolyte replacements, including repeat lab results as appropriate  - Instruct patient on fluid and nutrition as appropriate  Outcome: Progressing  Goal: Fluid balance maintained  Description: INTERVENTIONS:  - Monitor labs   - Monitor I/O and WT  - Instruct patient on fluid and nutrition as appropriate  - Assess for signs & symptoms of volume excess or deficit  Outcome: Progressing  Goal: Glucose maintained within target range  Description: INTERVENTIONS:  - Monitor Blood Glucose as  ordered  - Assess for signs and symptoms of hyperglycemia and hypoglycemia  - Administer ordered medications to maintain glucose within target range  - Assess nutritional intake and initiate nutrition service referral as needed  Outcome: Progressing     Problem: HEMATOLOGIC - ADULT  Goal: Maintains hematologic stability  Description: INTERVENTIONS  - Assess for signs and symptoms of bleeding or hemorrhage  - Monitor labs  - Administer supportive blood products/factors as ordered and appropriate  Outcome: Progressing     Problem: MUSCULOSKELETAL - ADULT  Goal: Maintain or return mobility to safest level of function  Description: INTERVENTIONS:  - Assess patient's ability to carry out ADLs; assess patient's baseline for ADL function and identify physical deficits which impact ability to perform ADLs (bathing, care of mouth/teeth, toileting, grooming, dressing, etc.)  - Assess/evaluate cause of self-care deficits   - Assess range of motion  - Assess patient's mobility  - Assess patient's need for assistive devices and provide as appropriate  - Encourage maximum independence but intervene and supervise when necessary  - Involve family in performance of ADLs  - Assess for home care needs following discharge   - Consider OT consult to assist with ADL evaluation and planning for discharge  - Provide patient education as appropriate  Outcome: Progressing  Goal: Maintain proper alignment of affected body part  Description: INTERVENTIONS:  - Support, maintain and protect limb and body alignment  - Provide patient/ family with appropriate education  Outcome: Progressing

## 2024-12-15 NOTE — PLAN OF CARE
Problem: INFECTION - ADULT  Goal: Absence or prevention of progression during hospitalization  Description: INTERVENTIONS:  - Assess and monitor for signs and symptoms of infection monitor b/l LE edema swelling redness  - Monitor lab/diagnostic results  - Monitor all insertion sites,  - Monitor endotracheal if appropriate and nasal secretions for changes in amount and color  - [place] appropriate cooling/warming therapies per order  - Administer medications as ordered  - Instruct and encourage patient and family to use good hand hygiene technique  - Identify and instruct in appropriate isolation precautions for identified infection/condition  Outcome: Progressing

## 2024-12-15 NOTE — CASE MANAGEMENT
Case Management Assessment & Discharge Planning Note    Patient name Pal Ledbetter  Location /-01 MRN [id number]  : 1945 Date 12/15/2024       Current Admission Date: 2024  Current Admission Diagnosis:Fall   Patient Active Problem List    Diagnosis Date Noted Date Diagnosed    Fall 2024     Leg swelling 2024     HTN (hypertension) 2024     Eosinophilic esophagitis      Acute CVA (cerebrovascular accident) (HCC) 2023     Acquired hypothyroidism 2023     CKD (chronic kidney disease) 2023     Thyroid nodule 2023     Penetrating atherosclerotic ulcer of aorta (HCC) 2023     Hyperlipidemia 2023     Foreign body sensation in throat 2023     Type 2 diabetes mellitus with hyperglycemia, with long-term current use of insulin ([facility])      Hypertensive urgency        LOS (days): 1  Geometric Mean LOS (GMLOS) (days):   Days to GMLOS:     OBJECTIVE:    Risk of Unplanned Readmission Score: 12.74         Current admission status: Inpatient  Referral Reason: Other (Disposition Planning)    Preferred Pharmacy:   [id number] - [place], [place] - [address] [facility]  [address] [place] [address]  Phone: [phone] Fax: [phone]    [id number] [address] [address]  Phone: [phone] Fax: [phone]    Primary Care Provider: Joseph Rinaldi MD    Primary Insurance: [facility]  Secondary Insurance:     ASSESSMENT:  Active Health Care Proxies    There are no active Health Care Proxies on file.       Advance Directives  Does patient have a Health Care POA?: No  Was patient offered paperwork?: Yes (son and patient declined)  Does patient currently have a Health Care decision maker?: Yes, please see Health Care Proxy section  Does patient have Advance Directives?: No  Was patient offered paperwork?: Yes (son and patient declined)  Primary Contact:  Juma Ledbetter, son or Avelino Ledbetter, son         Readmission Root Cause  30 Day Readmission: No    Patient Information  Admitted from:: Home  Mental Status: Confused  During Assessment patient was accompanied by: Son  Assessment information provided by:: Son  Primary Caregiver: Family  Caregiver's Name:: alejo Bone  Caregiver's Relationship to Patient:: Other (Specify) (son)  Caregiver's Telephone Number:: [phone] (M)  Support Systems: Son, Family members  County of Residence: [facility]  What city do you live in?: [place]  Home entry access options. Select all that apply.: Stairs  Number of steps to enter home.: One Flight (11 steps from front but only 1 step from back)  Do the steps have railings?: Yes  Type of Current Residence: 2 story home  Upon entering residence, is there a bedroom on the main floor (no further steps)?: No (pt sleeps on recliner located on first floor of home)  A bedroom is located on the following floor levels of residence (select all that apply):: 2nd Floor  Upon entering residence, is there a bathroom on the main floor (no further steps)?: Yes (1/2 bath)  Number of steps to 2nd floor from main floor: One Flight (stair chair)  Living Arrangements: Lives Alone  Is patient a ?: Yes ([employer], 2 years)  Is [facility] with [facility]: Yes  Is patient service connected?: No    Activities of Daily Living Prior to Admission  Functional Status: Assistance  Completes ADLs independently?: No  Level of ADL dependence: Assistance  Ambulates independently?: Yes  Does patient use assisted devices?: Yes  Assisted Devices (DME) used: Walker  Does patient currently own DME?: Yes  What DME does the patient currently own?: Walker, Straight Cane  Does patient have a history of Outpatient Therapy (PT/OT)?: No  Does the patient have a history of Short-Term Rehab?: Yes ([facility])  Does patient have a history of HHC?: Yes (either [facility] or [facility])  Does patient currently have HHC?:  No         Patient Information Continued  Income Source: SSI/SSD  Does patient have prescription coverage?: Yes  Does patient receive dialysis treatments?: No  Does patient have a history of substance abuse?: No  Does patient have a history of Mental Health Diagnosis?: No         Means of Transportation  Means of Transport to Appts:: Family transport          DISCHARGE DETAILS:    Discharge planning discussed with:: patient and Juma dhillon  Freedom of Choice: Yes  Comments - Freedom of Choice: [facility] referral for HHC  CM contacted family/caregiver?: Yes             Contacts  Patient Contacts: alejo Del Cid  Relationship to Patient:: Family  Contact Method: In Person  Reason/Outcome: Continuity of Care, Discharge Planning                        Treatment Team Recommendation: Short Term Rehab     Transport at Discharge : Family                    CM met with patient and sonJuma, at the bedside, baseline information was obtained. CM discussed the role of CM in helping the patient develop a discharge plan and assist the patient in carry out their plan.      Patient lives in [address] [place] home and sleeps in recliner located on first floor. Patient has stair chair to second floor to shower. Son, John, and his wife live next door. Jose spouse prepares meals for patient and assists with medication as she is home during day.  Sons, Avelnio, Juma and John, take turns staying with patient in evening. Juma reports patient has someone with him 24/7.  Juma reports sons are looking into securing private HHC Aides for a few hours per week so they can run errands. CM discussed HHC Aide options also through [facility] and/or [facility].  CM Placed contact information on AVS.      CM discussed with Juma dhillon, therapy recommendation for STR and Juma declined indicating patient has 24/7 at home. Juma reports patient is typically mobile in home with RW when his foot is not bothering him. Juma reports patient is also  typically sharp, feeling infection has patient confused currently.  Juma reports patient has HHC in past (either [facility] or [facility], following discharge form acute rehab).  Juma reports pending medical diagnosis, HHC will be considered.    CM submitted [facility] referral to explore HHC availability.    CM to follow for medical stability for discharge and any additional CM discharge referral needs.

## 2024-12-15 NOTE — PROGRESS NOTES
Progress Note - Hospitalist   Name: Pal Ledbetter 79 y.o. male I MRN: [id number]  Unit/Bed#: -01 I Date of Admission: 12/14/2024   Date of Service: 12/15/2024 I Hospital Day: 1    Assessment & Plan  Fall  Recurrent falls  Sons think 2/2 to leg swelling  May need rehab  Pt/ot recommendations level 2  Imaging /labs ok   Type 2 diabetes mellitus with hyperglycemia, with long-term current use of insulin ([facility])  Lab Results   Component Value Date    HGBA1C 7 (H) 09/19/2024       Recent Labs     12/14/24  1832 12/14/24  2127 12/15/24  0712 12/15/24  0744   POCGLU 104 163* 71 80       Blood Sugar Average: Last 72 hrs:  (P) 104.5on tresiba 70 units in am at home switched over to formulary Lantus decreased down to 30 units in the morning as his sugars are on the low side  iss    Leg swelling  Left > right left has been for months previous had venous duplex and was negative  No open wounds to suggest osteo  Some shin cellulitis  Cold feet hard to feel pulses  Has PVD - will obtain arterial duplex/venous duplex rule out dvt  He is status post 1 dose of Lasix 20 mg IV x 1 he is -2.075 L and his leg edema looks improved.  Creatinine is stable will give another dose of Lasix 20 mg IV x 1 suspect he would benefit from low-dose Lasix at home elevation of the legs  Tsh normal  Echo   Will need wraps when dvt ruled out  and leg elevation  Ancef for cellulitis   HTN (hypertension)  Continue losartan     VTE Pharmacologic Prophylaxis: VTE Score: 3 Moderate Risk (Score 3-4) - Pharmacological DVT Prophylaxis Ordered: heparin.    Mobility:   Basic Mobility Inpatient Raw Score: 14  JH-HLM Goal: 4: Move to chair/commode  JH-HLM Achieved: 6: Walk 10 steps or more  JH-HLM Goal NOT achieved. Continue with multidisciplinary rounding and encourage appropriate mobility to improve upon JH-HLM goals.    Patient Centered Rounds: I performed bedside rounds with nursing staff today.   Discussions with Specialists or Other Care Team Provider:  None    Education and Discussions with Family / Patient: Patient will update son's    Current Length of Stay: 1 day(s)  Current Patient Status: Inpatient   Certification Statement: The patient will continue to require additional inpatient hospital stay due to leg edema  Discharge Plan: Anticipate discharge in 24-48 hrs to discharge location to be determined pending rehab evaluations.    Code Status: Level 1 - Full Code    Subjective   Patient seen and examined denies any complaints wants to go home    Objective :  Temp:  [97.2 °F (36.2 °C)-97.7 °F (36.5 °C)] 97.5 °F (36.4 °C)  HR:  [65-87] 69  BP: (135-180)/(63-88) 135/63  Resp:  [16-18] 18  SpO2:  [93 %-98 %] 97 %  O2 Device: None (Room air)    Body mass index is 35.49 kg/m².     Input and Output Summary (last 24 hours):     Intake/Output Summary (Last 24 hours) at 12/15/2024 1000  Last data filed at 12/15/2024 0806  Gross per 24 hour   Intake 1080 ml   Output 2075 ml   Net -995 ml       Physical Exam  Vitals and nursing note reviewed.   Constitutional:       General: He is not in acute distress.     Appearance: He is well-developed.   HENT:      Head: Normocephalic and atraumatic.   Eyes:      Conjunctiva/sclera: Conjunctivae normal.   Cardiovascular:      Rate and Rhythm: Normal rate and regular rhythm.      Heart sounds: No murmur heard.  Pulmonary:      Effort: Pulmonary effort is normal. No respiratory distress.      Breath sounds: Normal breath sounds. No wheezing or rales.   Abdominal:      General: There is no distension.      Palpations: Abdomen is soft.      Tenderness: There is no abdominal tenderness.   Musculoskeletal:         General: No swelling.      Cervical back: Neck supple.   Skin:     General: Skin is warm and dry.      Capillary Refill: Capillary refill takes less than 2 seconds.   Neurological:      Mental Status: He is alert.      Comments: Patient is alert and oriented to self when I asked him if he is in the hospital he says yes he states  he wants to go home.  He knows it can be [place].  He knows how many sounds he has speech is confused to the month and the year   Psychiatric:         Mood and Affect: Mood normal.           Lines/Drains:  Lines/Drains/Airways       Active Status       Name Placement date Placement time Site Days    External Urinary Catheter Small 12/14/24  2100  -- less than 1                            Lab Results: I have reviewed the following results:   Results from last 7 days   Lab Units 12/14/24  1520   WBC Thousand/uL 7.49   HEMOGLOBIN g/dL 14.9   HEMATOCRIT % 45.5   PLATELETS Thousands/uL 190   SEGS PCT % 71   LYMPHO PCT % 15   MONO PCT % 10   EOS PCT % 3     Results from last 7 days   Lab Units 12/15/24  0436 12/14/24  1520   SODIUM mmol/L 133* 132*   POTASSIUM mmol/L 4.0 4.4   CHLORIDE mmol/L 101 98   CO2 mmol/L 27 26   BUN mg/dL 25 25   CREATININE mg/dL 1.15 1.02   ANION GAP mmol/L 5 8   CALCIUM mg/dL 8.7 8.9   ALBUMIN g/dL  --  3.7   TOTAL BILIRUBIN mg/dL  --  1.32*   ALK PHOS U/L  --  83   ALT U/L  --  20   AST U/L  --  23   GLUCOSE RANDOM mg/dL 82 123     Results from last 7 days   Lab Units 12/10/24  1931   INR  0.98     Results from last 7 days   Lab Units 12/15/24  0744 12/15/24  0712 12/14/24  2127 12/14/24  1832   POC GLUCOSE mg/dl 80 71 163* 104         Results from last 7 days   Lab Units 12/10/24  1931   LACTIC ACID mmol/L 1.5       Recent Cultures (last 7 days):   Results from last 7 days   Lab Units 12/10/24  1931   BLOOD CULTURE  No Growth at 72 hrs.  No Growth at 72 hrs.       Imaging Results Review: No pertinent imaging studies reviewed.  Other Study Results Review: No additional pertinent studies reviewed.    Last 24 Hours Medication List:     Current Facility-Administered Medications:     acetaminophen (TYLENOL) tablet 650 mg, Q6H PRN    aspirin (ECOTRIN LOW STRENGTH) EC tablet 81 mg, Daily    atorvastatin (LIPITOR) tablet 40 mg, Daily With Dinner    ceFAZolin (ANCEF) IVPB (premix in dextrose) 2,000  mg 50 mL, Q8H, Last Rate: 2,000 mg (12/15/24 0200)    furosemide (LASIX) injection 20 mg, Once    heparin (porcine) subcutaneous injection 5,000 Units, Q8H PHUC **AND** [CANCELED] Platelet count, Once    insulin glargine (LANTUS) subcutaneous injection 30 Units 0.3 mL, QAM    insulin lispro (HumALOG/ADMELOG) 100 units/mL subcutaneous injection 2-12 Units, TID AC **AND** Fingerstick Glucose (POCT), TID AC    levothyroxine tablet 125 mcg, Early Morning    losartan (COZAAR) tablet 50 mg, Daily    nystatin (MYCOSTATIN) powder, BID    pantoprazole (PROTONIX) EC tablet 40 mg, Early Morning    Administrative Statements   Today, Patient Was Seen By: Maria Esther Kilpatrick MD  I have spent a total time of >35 minutes in caring for this patient on the day of the visit/encounter including Patient and family education, Documenting in the medical record, and Reviewing / ordering tests, medicine, procedures  .    **Please Note: This note may have been constructed using a voice recognition system.**

## 2024-12-15 NOTE — ASSESSMENT & PLAN NOTE
Lab Results   Component Value Date    HGBA1C 7 (H) 09/19/2024       Recent Labs     12/14/24  1832 12/14/24  2127 12/15/24  0712 12/15/24  0744   POCGLU 104 163* 71 80       Blood Sugar Average: Last 72 hrs:  (P) 104.5on tresiba 70 units in am at home switched over to formulary Lantus decreased down to 30 units in the morning as his sugars are on the low side  iss

## 2024-12-15 NOTE — ASSESSMENT & PLAN NOTE
Left > right left has been for months previous had venous duplex and was negative  No open wounds to suggest osteo  Some shin cellulitis  Cold feet hard to feel pulses  Has PVD - will obtain arterial duplex/venous duplex rule out dvt  He is status post 1 dose of Lasix 20 mg IV x 1 he is -2.075 L and his leg edema looks improved.  Creatinine is stable will give another dose of Lasix 20 mg IV x 1 suspect he would benefit from low-dose Lasix at home elevation of the legs  Tsh normal  Echo   Will need wraps when dvt ruled out  and leg elevation  Ancef for cellulitis

## 2024-12-15 NOTE — PLAN OF CARE
Problem: PHYSICAL THERAPY ADULT  Goal: Performs mobility at highest level of function for planned discharge setting.  See evaluation for individualized goals.  Description: Treatment/Interventions: ADL retraining, Functional transfer training, LE strengthening/ROM, Elevations, Therapeutic exercise, Endurance training, Patient/family training, Equipment eval/education, Bed mobility, Gait training, Compensatory technique education, Spoke to nursing, Spoke to case management          See flowsheet documentation for full assessment, interventions and recommendations.  12/15/2024 0924 by Brissa Varma, PT  Note: Prognosis: Good  Problem List: Decreased strength, Decreased endurance, Impaired balance, Decreased mobility, Decreased cognition, Decreased safety awareness, Impaired judgement, Obesity, Decreased skin integrity, Pain  Pt tolerated session fairly despite pain. he was able to ambulate and transfer with decreased assistance with RW compared to no AD. He requires increased cues for saftey and improved tehcnique with mobility. He is limited by decreased stength, balance, endruance and pain and impaired cognition. He will continue to benefit from PT services to maximize LOF.     Barriers to Discharge: Decreased caregiver support, Inaccessible home environment  Barriers to Discharge Comments: requires assistance to complete mobility, recent falls, increased fall risk, safety concerns with impaired cognition  Rehab Resource Intensity Level, PT: II (Moderate Resource Intensity) (PAR)    See flowsheet documentation for full assessment.

## 2024-12-15 NOTE — ASSESSMENT & PLAN NOTE
Recurrent falls  Sons think 2/2 to leg swelling  May need rehab  Pt/ot recommendations level 2  Imaging /labs ok

## 2024-12-16 ENCOUNTER — APPOINTMENT (INPATIENT)
Dept: NON INVASIVE DIAGNOSTICS | Facility: HOSPITAL | Age: 79
DRG: 638 | End: 2024-12-16
Payer: COMMERCIAL

## 2024-12-16 VITALS
DIASTOLIC BLOOD PRESSURE: 85 MMHG | SYSTOLIC BLOOD PRESSURE: 168 MMHG | TEMPERATURE: 97.3 F | HEIGHT: 72 IN | BODY MASS INDEX: 34.13 KG/M2 | WEIGHT: 252 LBS | RESPIRATION RATE: 18 BRPM | HEART RATE: 65 BPM | OXYGEN SATURATION: 95 %

## 2024-12-16 PROBLEM — L03.116 CELLULITIS OF LEFT LOWER EXTREMITY: Status: ACTIVE | Noted: 2024-12-16

## 2024-12-16 LAB
ANION GAP SERPL CALCULATED.3IONS-SCNC: 7 MMOL/L (ref 4–13)
AORTIC ROOT: 4.4 CM
APICAL FOUR CHAMBER EJECTION FRACTION: 80 %
ASCENDING AORTA: 4.7 CM
BACTERIA BLD CULT: NORMAL
BACTERIA BLD CULT: NORMAL
BSA FOR ECHO PROCEDURE: 2.35 M2
BUN SERPL-MCNC: 26 MG/DL (ref 5–25)
CALCIUM SERPL-MCNC: 8.9 MG/DL (ref 8.4–10.2)
CHLORIDE SERPL-SCNC: 99 MMOL/L (ref 96–108)
CO2 SERPL-SCNC: 29 MMOL/L (ref 21–32)
CREAT SERPL-MCNC: 1.26 MG/DL (ref 0.6–1.3)
DOP CALC LVOT AREA: 6.6 CM2
DOP CALC LVOT DIAMETER: 2.9 CM
E WAVE DECELERATION TIME: 267 MS
E/A RATIO: 0.75
FRACTIONAL SHORTENING: 39 (ref 28–44)
GFR SERPL CREATININE-BSD FRML MDRD: 53 ML/MIN/1.73SQ M
GLUCOSE SERPL-MCNC: 195 MG/DL (ref 65–140)
GLUCOSE SERPL-MCNC: 91 MG/DL (ref 65–140)
GLUCOSE SERPL-MCNC: 97 MG/DL (ref 65–140)
INTERVENTRICULAR SEPTUM IN DIASTOLE (PARASTERNAL SHORT AXIS VIEW): 1.2 CM
INTERVENTRICULAR SEPTUM: 1.2 CM (ref 0.6–1.1)
LAAS-AP2: 12.8 CM2
LAAS-AP4: 17.2 CM2
LEFT ATRIUM SIZE: 2.9 CM
LEFT ATRIUM VOLUME (MOD BIPLANE): 36 ML
LEFT ATRIUM VOLUME INDEX (MOD BIPLANE): 15.3 ML/M2
LEFT INTERNAL DIMENSION IN SYSTOLE: 2.3 CM (ref 2.1–4)
LEFT VENTRICLE DIASTOLIC VOLUME (MOD BIPLANE): 78 ML
LEFT VENTRICLE DIASTOLIC VOLUME INDEX (MOD BIPLANE): 33.2 ML/M2
LEFT VENTRICLE SYSTOLIC VOLUME (MOD BIPLANE): 22 ML
LEFT VENTRICLE SYSTOLIC VOLUME INDEX (MOD BIPLANE): 9.4 ML/M2
LEFT VENTRICULAR INTERNAL DIMENSION IN DIASTOLE: 3.8 CM (ref 3.5–6)
LEFT VENTRICULAR POSTERIOR WALL IN END DIASTOLE: 1.2 CM
LEFT VENTRICULAR STROKE VOLUME: 43 ML
LV EF: 72 %
LVSV (TEICH): 43 ML
MV E'TISSUE VEL-LAT: 9 CM/S
MV E'TISSUE VEL-SEP: 5 CM/S
MV PEAK A VEL: 0.85 M/S
MV PEAK E VEL: 64 CM/S
MV STENOSIS PRESSURE HALF TIME: 78 MS
MV VALVE AREA P 1/2 METHOD: 2.82
POTASSIUM SERPL-SCNC: 4.1 MMOL/L (ref 3.5–5.3)
RIGHT ATRIUM AREA SYSTOLE A4C: 18.8 CM2
RIGHT VENTRICLE ID DIMENSION: 4.9 CM
SINOTUBULAR JUNCTION: 4.6 CM
SL CV LEFT ATRIUM LENGTH A2C: 5 CM
SL CV LV EF: 65
SL CV PED ECHO LEFT VENTRICLE DIASTOLIC VOLUME (MOD BIPLANE) 2D: 61 ML
SL CV PED ECHO LEFT VENTRICLE SYSTOLIC VOLUME (MOD BIPLANE) 2D: 18 ML
SL CV SINUS OF VALSALVA 2D: 4.8 CM
SODIUM SERPL-SCNC: 135 MMOL/L (ref 135–147)
STJ: 4.6 CM
TRICUSPID ANNULAR PLANE SYSTOLIC EXCURSION: 2.1 CM

## 2024-12-16 PROCEDURE — 97110 THERAPEUTIC EXERCISES: CPT

## 2024-12-16 PROCEDURE — 93970 EXTREMITY STUDY: CPT

## 2024-12-16 PROCEDURE — 97167 OT EVAL HIGH COMPLEX 60 MIN: CPT

## 2024-12-16 PROCEDURE — 97116 GAIT TRAINING THERAPY: CPT

## 2024-12-16 PROCEDURE — 97530 THERAPEUTIC ACTIVITIES: CPT

## 2024-12-16 PROCEDURE — 80048 BASIC METABOLIC PNL TOTAL CA: CPT | Performed by: FAMILY MEDICINE

## 2024-12-16 PROCEDURE — 93306 TTE W/DOPPLER COMPLETE: CPT | Performed by: INTERNAL MEDICINE

## 2024-12-16 PROCEDURE — 93970 EXTREMITY STUDY: CPT | Performed by: SURGERY

## 2024-12-16 PROCEDURE — 97535 SELF CARE MNGMENT TRAINING: CPT

## 2024-12-16 PROCEDURE — 93306 TTE W/DOPPLER COMPLETE: CPT

## 2024-12-16 PROCEDURE — 99239 HOSP IP/OBS DSCHRG MGMT >30: CPT | Performed by: FAMILY MEDICINE

## 2024-12-16 PROCEDURE — 82948 REAGENT STRIP/BLOOD GLUCOSE: CPT

## 2024-12-16 RX ORDER — FUROSEMIDE 20 MG/1
20 TABLET ORAL DAILY
Qty: 30 TABLET | Refills: 0 | Status: SHIPPED | OUTPATIENT
Start: 2024-12-17

## 2024-12-16 RX ADMIN — INSULIN GLARGINE 30 UNITS: 100 INJECTION, SOLUTION SUBCUTANEOUS at 08:13

## 2024-12-16 RX ADMIN — CEFAZOLIN SODIUM 2000 MG: 2 SOLUTION INTRAVENOUS at 01:09

## 2024-12-16 RX ADMIN — NYSTATIN: 100000 POWDER TOPICAL at 08:14

## 2024-12-16 RX ADMIN — INSULIN LISPRO 2 UNITS: 100 INJECTION, SOLUTION INTRAVENOUS; SUBCUTANEOUS at 11:55

## 2024-12-16 RX ADMIN — LOSARTAN POTASSIUM 50 MG: 50 TABLET, FILM COATED ORAL at 08:13

## 2024-12-16 RX ADMIN — ASPIRIN 81 MG: 81 TABLET, COATED ORAL at 08:13

## 2024-12-16 RX ADMIN — HEPARIN SODIUM 5000 UNITS: 5000 INJECTION, SOLUTION INTRAVENOUS; SUBCUTANEOUS at 05:31

## 2024-12-16 NOTE — OCCUPATIONAL THERAPY NOTE
"    Occupational Therapy Evaluation     Patient Name: Pal Ledbetter  Today's Date: 2024  Problem List  Principal Problem:    Fall  Active Problems:    Type 2 diabetes mellitus with hyperglycemia, with long-term current use of insulin (HCC)    Leg swelling    HTN (hypertension)    Cellulitis of left lower extremity    Past Medical History  Past Medical History:   Diagnosis Date    Diabetes mellitus (HCC)     Hypertension     L2 vertebral fracture (HCC)     Stricture esophagus     Stroke (cerebrum) ([facility]) 2023     Past Surgical History  Past Surgical History:   Procedure Laterality Date    CATARACT EXTRACTION Bilateral     CHOLECYSTECTOMY      EGD           24 1032   OT Last Visit   OT Visit Date 24   Note Type   Note type Evaluation   Pain Assessment   Pain Assessment Tool 0-10   Pain Score No Pain   Restrictions/Precautions   Weight Bearing Precautions Per Order No   Other Precautions Chair Alarm;Bed Alarm;Cognitive;Fall Risk   Home Living   Type of Home House  (12 YOLANDA B HRs, but only able to use one side at a time)   Home Layout Two level;Bed/bath upstairs;1/2 bath on main level  (has been sleeping on 1st floor at times, stair glide to 2nd floor full bathroom)   Bathroom Shower/Tub Walk-in shower   Bathroom Toilet Raised   Bathroom Equipment Shower chair   Bathroom Accessibility Accessible   Home Equipment Cane;Walker  (doesn't use an AD PTA but reports he will use a walker or a cane at times.)   Prior Function   Level of Centre Independent with ADLs;Independent with functional mobility;Needs assistance with IADLS   Lives With Alone  (pt reports living with his spouse. per family patient has been going to the door and looking for his  wife.)   Receives Help From Family   IADLs Family/Friend/Other provides transportation;Family/Friend/Other provides meals;Family/Friend/Other provides medication management  (pt reports he is driving.)   Falls in the last 6 months 1 to 4  (\"At " "least 2\" Pt came to hospital s/p fall on 12/10 and 12/14)   ADL   Where Assessed Edge of bed   UB Dressing Assistance 5  Supervision/Setup   LB Dressing Assistance 3  Moderate Assistance   LB Dressing Deficit   (Pt able to doff B socks, required assistance with donning R sock. Therapist assisted with donning underwear.)   Additional Comments toileting and grooming completed during treatment session. See treatrment assessment for further details   Bed Mobility   Supine to Sit 5  Supervision   Additional items Increased time required;Verbal cues;HOB elevated;Bedrails   Additional Comments HOB elevated > 30 degrees, use of bedrails. inc time to complete.   Transfers   Sit to Stand 4  Minimal assistance   Additional items Assist x 1;Increased time required;Verbal cues   Stand to Sit 4  Minimal assistance   Additional items Assist x 1;Increased time required;Verbal cues   Stand pivot 4  Minimal assistance   Additional items Assist x 1;Increased time required;Verbal cues   Additional Comments RW. inc time to achieve standing. verbal cues for hand placement. slight posterior lean upon standing. spt with RW with minAx1 with min manual cues for balance and mod verbal cues to stay within NAOMY of RW when turning. standing dynamic balance reaching anteriorly outside NAOMY with minAx1, slight posteiror lean. pt wiht LOB turning stepping posteriorly with minAx1 to recover.   Functional Mobility   Functional Mobility 4  Minimal assistance   Additional Comments Pt completed stand pivot transfers with a rolling walker and Min A   Balance   Static Sitting Good   Dynamic Sitting Fair +   Static Standing Fair -   Dynamic Standing Poor +   Ambulatory Poor +   Activity Tolerance   Activity Tolerance Patient limited by fatigue  (limited by cognitive impairments)   Medical Staff Made Aware PT Brissa AMES Assessment   RUE Assessment WFL   LUE Assessment   LUE Assessment WFL   Hand Function   Gross Motor Coordination Functional   Fine Motor " Coordination Functional   Hand Function Comments L hand dominant   Cognition   Overall Cognitive Status Impaired   Arousal/Participation Cooperative   Attention Attends with cues to redirect   Orientation Level Oriented to person;Oriented to place;Disoriented to situation   Memory Decreased recall of recent events;Decreased short term memory;Decreased long term memory   Following Commands Follows one step commands with increased time or repetition   Assessment   Limitation Decreased ADL status;Decreased UE strength;Decreased Safe judgement during ADL;Decreased cognition;Decreased endurance;Decreased self-care trans;Decreased high-level ADLs   Prognosis Fair   Assessment Pt is a 79 y.o. male, admitted to [facility] 12/14/2024 d/t experiencing recent falls and swelling in his legs. Dx: fall. Pt with PMHx impacting their performance during ADL tasks, including: DMII, HTN, L2 Vertebral fracture, CVA. Prior to admission to the hospital Pt was performing ADLs without physical assistance. IADLs with physical assistance. Functional transfers/ambulation without physical assistance. Cognitive status was PTA was impaired, and he was receiving 24/7 supervision from his family. OT order placed to assess Pt's ADLs, cognitive status, and performance during functional tasks in order to maximize safety and independence while making most appropriate d/c recommendations. PT/OT co-evaluation completed at this time d/t significant mobility deficits and safety concerns. Pt's clinical presentation is currently unstable/unpredictable given new onset deficits that effect Pt's occupational performance and ability to safely return to PLOF including decrease activity tolerance, decrease standing balance, decrease sitting balance, decrease performance during ADL tasks, decrease cognition, decrease safety awareness , decrease activity engagement, decrease performance during functional transfers, high fall risk, and limited insight to deficits combined  with medical complications of need for input for mobility technique/safety. Personal factors affecting Pt at time of initial evaluation include: step(s) to enter environment, multi-level environment, inability to perform IADLs, inability to perform ADLs, inability to ambulate household distances, inability to navigate community distances, limited insight into impairments, decreased initiation and engagement, and recent fall(s)/fall history. Pt will benefit from continued skilled OT services to address deficits as defined above and to maximize level independence/participation during ADLs and functional tasks to facilitate return toward PLOF and improved quality of life. From an occupational therapy standpoint, recommendation at time of d/c would be Level II: Moderate Resource Intensity.   Plan   Treatment Interventions ADL retraining;Functional transfer training;Cognitive reorientation;Endurance training;Patient/family training;Energy conservation;Activityengagement   Goal Expiration Date 12/30/24   OT Treatment Day 1   OT Frequency 2-3x/wk   Discharge Recommendation   Rehab Resource Intensity Level, OT II (Moderate Resource Intensity)   AM-PAC Daily Activity Inpatient   Lower Body Dressing 3   Bathing 3   Toileting 3   Upper Body Dressing 3   Grooming 3   Eating 3   Daily Activity Raw Score 18   Daily Activity Standardized Score (Calc for Raw Score >=11) 38.66   AM-PAC Applied Cognition Inpatient   Following a Speech/Presentation 3   Understanding Ordinary Conversation 3   Taking Medications 2   Remembering Where Things Are Placed or Put Away 1   Remembering List of 4-5 Errands 1   Taking Care of Complicated Tasks 1   Applied Cognition Raw Score 11   Applied Cognition Standardized Score 27.03   Additional Treatment Session   Start Time 1105   End Time 1117   Treatment Assessment Treatment session initiated with pt sitting on commode. Pt had continent bowel episode and required Mod A for toileting (some help with  hygiene for thoroughness and assistance with clothing management). Pt completed toilet transfer with ww, grab bars, and steadying assist. Pt walked to sink with mod verbal cues, ww, and Min A due to unsafe use of ww, then completed handwashing with steadying assist. Pt walked from bathroom to recliner with Min a and ww. Pt remained in recliner at end of session. Continue with plan of care.   Additional Treatment Day 1   End of Consult   Education Provided Yes   Patient Position at End of Consult Bedside chair;Bed/Chair alarm activated;All needs within reach     The patient's raw score on the AM-PAC Daily Activity Inpatient Short Form is 18. A raw score of less than 19 suggests the patient may benefit from discharge to post-acute rehabilitation services. Please refer to the recommendation of the Occupational Therapist for safe discharge planning.    Pt goals to be met by 12/30/2024    Pt will demonstrate ability to complete grooming/hygiene tasks @ independent after set-up.  Pt will demonstrate ability to complete supine<>sit @ independent in order to increase safety and independence during ADL tasks.  Pt will demonstrate ability to complete UB ADLs including washing/dressing @ supervision in order to increase performance and participation during meaningful tasks  Pt will demonstrate ability to complete LB dressing @ supervision in order to increase safety and independence during meaningful tasks.   Pt will demonstrate ability to beverley/doff socks/shoes while sitting in recliner @ independent in order to increase safety and independence during meaningful tasks.   Pt will demonstrate ability to complete toileting tasks including CM and pericare @ supervision in order to increase safety and independence during meaningful tasks.  Pt will demonstrate ability to complete EOB, chair, toilet/commode transfers @ supervision in order to increase performance and participation during functional tasks.  Pt will demonstrate ability  to stand for 5 minutes while maintaining fair balance with use of a rolling walker for UB support PRN.  Pt will demonstrate ability to tolerate 30-35 minute OT session with no vc'ing for deep breathing or use of energy conservation techniques in order to increase activity tolerance during functional tasks.   Pt will demonstrate Good carryover of use of energy conservation/compensatory strategies during ADLs and functional tasks in order to increase safety and reduce risk for falls.   Pt will demonstrate Good attention and participation in continued evaluation of functional ambulation house hold distances in order to assist with safe d/c planning.  Pt will attend to continued cognitive assessments 100% of the time in order to provide most appropriate d/c recommendations.   Pt will follow 100% simple 2-step commands and be A&O x4 consistently with environmental cues to increase participation in functional activities.   Pt will identify 3 areas of interest/hobbies and 1 intervention on how to incorporate into daily life in order to increase interaction with environment and peers as well as increase participation in meaningful tasks.   Pt will demonstrate 100% carryover of BUE HEP in order to increase BUE MS and increase performance during functional tasks upon d/c home.    Michael Prasad, OTR/L

## 2024-12-16 NOTE — CASE MANAGEMENT
Case Management Discharge Planning Note    Patient name Pal Ledbetter  Location /-01 MRN [id number]  : 1945 Date 2024       Current Admission Date: 2024  Current Admission Diagnosis:Fall   Patient Active Problem List    Diagnosis Date Noted Date Diagnosed    Cellulitis of left lower extremity 2024     Fall 2024     Leg swelling 2024     HTN (hypertension) 2024     Eosinophilic esophagitis      Acute CVA (cerebrovascular accident) (HCC) 2023     Acquired hypothyroidism 2023     CKD (chronic kidney disease) 2023     Thyroid nodule 2023     Penetrating atherosclerotic ulcer of aorta (HCC) 2023     Hyperlipidemia 2023     Foreign body sensation in throat 2023     Type 2 diabetes mellitus with hyperglycemia, with long-term current use of insulin (HCC)      Hypertensive urgency        LOS (days): 2  Geometric Mean LOS (GMLOS) (days): 3.1  Days to GMLOS:1.3     OBJECTIVE:  Risk of Unplanned Readmission Score: 14.51         Current admission status: Inpatient   Preferred Pharmacy:   [id number] - [place], [place] - [address]  [address] [place] [address]  Phone: [phone] Fax: [phone]    [id number] - [address] [address]  Phone: [phone] Fax: [phone]    Primary Care Provider: Joseph Rinaldi MD    Primary Insurance: [facility]  Secondary Insurance:     DISCHARGE DETAILS:        CM updated [facility] on discharge and uploaded AVS and Script to [facility].

## 2024-12-16 NOTE — ASSESSMENT & PLAN NOTE
Lower extremity cellulitis due to diabetes, POA, in patient with known history of DM, evidenced by LE edema/erythema, requiring treatment with IV Cefazolin.  Improved resume outpatient antibiotic

## 2024-12-16 NOTE — ASSESSMENT & PLAN NOTE
Lab Results   Component Value Date    HGBA1C 7 (H) 09/19/2024       Recent Labs     12/15/24  1042 12/15/24  1608 12/16/24  0720 12/16/24  1036   POCGLU 225* 157* 97 195*       Blood Sugar Average: Last 72 hrs:  (P) 136.5  iss and Tresiba 70 units at home resume his home outpatient med regimen

## 2024-12-16 NOTE — PLAN OF CARE
Problem: PAIN - ADULT  Goal: Verbalizes/displays adequate comfort level or baseline comfort level  Description: Interventions:  - Encourage patient to monitor pain and request assistance  - Assess pain using appropriate pain scale  - Administer analgesics based on type and severity of pain and evaluate response  - Implement non-pharmacological measures as appropriate and evaluate response  - Consider cultural and social influences on pain and pain management  - Notify physician/advanced practitioner if interventions unsuccessful or patient reports new pain  Outcome: Progressing     Problem: INFECTION - ADULT  Goal: Absence or prevention of progression during hospitalization  Description: INTERVENTIONS:  - Assess and monitor for signs and symptoms of infection monitor b/l LE edema swelling redness  - Monitor lab/diagnostic results  - Monitor all insertion sites,  - Monitor endotracheal if appropriate and nasal secretions for changes in amount and color  - [place] appropriate cooling/warming therapies per order  - Administer medications as ordered  - Instruct and encourage patient and family to use good hand hygiene technique  - Identify and instruct in appropriate isolation precautions for identified infection/condition  Outcome: Progressing  Goal: Absence of fever/infection during neutropenic period  Description: INTERVENTIONS:  - Monitor WBC    Outcome: Progressing     Problem: SAFETY ADULT  Goal: Patient will remain free of falls  Description: INTERVENTIONS:  - Educate patient/family on patient safety including physical limitations  - Instruct patient to call for assistance with activity   - Consult OT/PT to assist with strengthening/mobility   - Keep Call bell within reach  - Keep bed low and locked with side rails adjusted as appropriate  - Keep care items and personal belongings within reach  - Initiate and maintain comfort rounds  - Make Fall Risk Sign visible to staff  - Offer Toileting every 2 Hours, in  advance of need  - Initiate/Maintain alarm  - Obtain necessary fall risk management equipment  - Apply yellow socks and bracelet for high fall risk patients  - Consider moving patient to room near nurses station  Outcome: Progressing  Goal: Maintain or return to baseline ADL function  Description: INTERVENTIONS:  -  Assess patient's ability to carry out ADLs; assess patient's baseline for ADL function and identify physical deficits which impact ability to perform ADLs (bathing, care of mouth/teeth, toileting, grooming, dressing, etc.)  - Assess/evaluate cause of self-care deficits   - Assess range of motion  - Assess patient's mobility; develop plan if impaired  - Assess patient's need for assistive devices and provide as appropriate  - Encourage maximum independence but intervene and supervise when necessary  - Involve family in performance of ADLs  - Assess for home care needs following discharge   - Consider OT consult to assist with ADL evaluation and planning for discharge  - Provide patient education as appropriate  Outcome: Progressing  Goal: Maintains/Returns to pre admission functional level  Description: INTERVENTIONS:  - Perform AM-PAC 6 Click Basic Mobility/ Daily Activity assessment daily.  - Set and communicate daily mobility goal to care team and patient/family/caregiver.   - Collaborate with rehabilitation services on mobility goals if consulted  - Perform Range of Motion 3 times a day.  - Reposition patient every 2 hours.  - Dangle patient 3 times a day  - Stand patient 3 times a day  - Ambulate patient 3 times a day  - Out of bed to chair 3 times a day   - Out of bed for meals 3 times a day  - Out of bed for toileting  - Record patient progress and toleration of activity level   Outcome: Progressing     Problem: DISCHARGE PLANNING  Goal: Discharge to home or other facility with appropriate resources  Description: INTERVENTIONS:  - Identify barriers to discharge w/patient and caregiver  - Arrange  for needed discharge resources and transportation as appropriate  - Identify discharge learning needs (meds, wound care, etc.)  - Arrange for interpretive services to assist at discharge as needed  - Refer to Case Management Department for coordinating discharge planning if the patient needs post-hospital services based on physician/advanced practitioner order or complex needs related to functional status, cognitive ability, or social support system  Outcome: Progressing     Problem: Knowledge Deficit  Goal: Patient/family/caregiver demonstrates understanding of disease process, treatment plan, medications, and discharge instructions  Description: Complete learning assessment and assess knowledge base.  Interventions:  - Provide teaching at level of understanding  - Provide teaching via preferred learning methods  Outcome: Progressing     Problem: NEUROSENSORY - ADULT  Goal: Achieves stable or improved neurological status  Description: INTERVENTIONS  - Monitor and report changes in neurological status  - Monitor vital signs such as temperature, blood pressure, glucose, and any other labs ordered   - Initiate measures to prevent increased intracranial pressure  - Monitor for seizure activity and implement precautions if appropriate      Outcome: Progressing  Goal: Remains free of injury related to seizures activity  Description: INTERVENTIONS  - Maintain airway, patient safety  and administer oxygen as ordered  - Monitor patient for seizure activity, document and report duration and description of seizure to physician/advanced practitioner  - If seizure occurs,  ensure patient safety during seizure  - Reorient patient post seizure  - Seizure pads on all 4 side rails  - Instruct patient/family to notify RN of any seizure activity including if an aura is experienced  - Instruct patient/family to call for assistance with activity based on nursing assessment  - Administer anti-seizure medications if ordered    Outcome:  Progressing  Goal: Achieves maximal functionality and self care  Description: INTERVENTIONS  - Monitor swallowing and airway patency with patient fatigue and changes in neurological status  - Encourage and assist patient to increase activity and self care.   - Encourage visually impaired, hearing impaired and aphasic patients to use assistive/communication devices  Outcome: Progressing     Problem: CARDIOVASCULAR - ADULT  Goal: Maintains optimal cardiac output and hemodynamic stability  Description: INTERVENTIONS:  - Monitor I/O, vital signs and rhythm  - Monitor for S/S and trends of decreased cardiac output  - Administer and titrate ordered vasoactive medications to optimize hemodynamic stability  - Assess quality of pulses, skin color and temperature  - Assess for signs of decreased coronary artery perfusion  - Instruct patient to report change in severity of symptoms  Outcome: Progressing  Goal: Absence of cardiac dysrhythmias or at baseline rhythm  Description: INTERVENTIONS:  - Continuous cardiac monitoring, vital signs, obtain 12 lead EKG if ordered  - Administer antiarrhythmic and heart rate control medications as ordered  - Monitor electrolytes and administer replacement therapy as ordered  Outcome: Progressing     Problem: RESPIRATORY - ADULT  Goal: Achieves optimal ventilation and oxygenation  Description: INTERVENTIONS:  - Assess for changes in respiratory status  - Assess for changes in mentation and behavior  - Position to facilitate oxygenation and minimize respiratory effort  - Oxygen administered by appropriate delivery if ordered  - Initiate smoking cessation education as indicated  - Encourage broncho-pulmonary hygiene including cough, deep breathe, Incentive Spirometry  - Assess the need for suctioning and aspirate as needed  - Assess and instruct to report SOB or any respiratory difficulty  - Respiratory Therapy support as indicated  Outcome: Progressing     Problem: GASTROINTESTINAL -  ADULT  Goal: Minimal or absence of nausea and/or vomiting  Description: INTERVENTIONS:  - Administer IV fluids if ordered to ensure adequate hydration  - Maintain NPO status until nausea and vomiting are resolved  - Nasogastric tube if ordered  - Administer ordered antiemetic medications as needed  - Provide nonpharmacologic comfort measures as appropriate  - Advance diet as tolerated, if ordered  - Consider nutrition services referral to assist patient with adequate nutrition and appropriate food choices  Outcome: Progressing  Goal: Maintains or returns to baseline bowel function  Description: INTERVENTIONS:  - Assess bowel function  - Encourage oral fluids to ensure adequate hydration  - Administer IV fluids if ordered to ensure adequate hydration  - Administer ordered medications as needed  - Encourage mobilization and activity  - Consider nutritional services referral to assist patient with adequate nutrition and appropriate food choices  Outcome: Progressing  Goal: Maintains adequate nutritional intake  Description: INTERVENTIONS:  - Monitor percentage of each meal consumed  - Identify factors contributing to decreased intake, treat as appropriate  - Assist with meals as needed  - Monitor I&O, weight, and lab values if indicated  - Obtain nutrition services referral as needed  Outcome: Progressing  Goal: Establish and maintain optimal ostomy function  Description: INTERVENTIONS:  - Assess bowel function  - Encourage oral fluids to ensure adequate hydration  - Administer IV fluids if ordered to ensure adequate hydration   - Administer ordered medications as needed  - Encourage mobilization and activity  - Nutrition services referral to assist patient with appropriate food choices  - Assess stoma site  - Consider wound care consult   Outcome: Progressing  Goal: Oral mucous membranes remain intact  Description: INTERVENTIONS  - Assess oral mucosa and hygiene practices  - Implement preventative oral hygiene  regimen  - Implement oral medicated treatments as ordered  - Initiate Nutrition services referral as needed  Outcome: Progressing     Problem: GENITOURINARY - ADULT  Goal: Maintains or returns to baseline urinary function  Description: INTERVENTIONS:  - Assess urinary function  - Encourage oral fluids to ensure adequate hydration if ordered  - Administer IV fluids as ordered to ensure adequate hydration  - Administer ordered medications as needed  - Offer frequent toileting  - Follow urinary retention protocol if ordered  Outcome: Progressing  Goal: Absence of urinary retention  Description: INTERVENTIONS:  - Assess patient’s ability to void and empty bladder  - Monitor I/O  - Bladder scan as needed  - Discuss with physician/AP medications to alleviate retention as needed  - Discuss catheterization for long term situations as appropriate  Outcome: Progressing  Goal: Urinary catheter remains patent  Description: INTERVENTIONS:  - Assess patency of urinary catheter  - If patient has a chronic nguyen, consider changing catheter if non-functioning  - Follow guidelines for intermittent irrigation of non-functioning urinary catheter  Outcome: Progressing     Problem: METABOLIC, FLUID AND ELECTROLYTES - ADULT  Goal: Electrolytes maintained within normal limits  Description: INTERVENTIONS:  - Monitor labs and assess patient for signs and symptoms of electrolyte imbalances  - Administer electrolyte replacement as ordered  - Monitor response to electrolyte replacements, including repeat lab results as appropriate  - Instruct patient on fluid and nutrition as appropriate  Outcome: Progressing  Goal: Fluid balance maintained  Description: INTERVENTIONS:  - Monitor labs   - Monitor I/O and WT  - Instruct patient on fluid and nutrition as appropriate  - Assess for signs & symptoms of volume excess or deficit  Outcome: Progressing  Goal: Glucose maintained within target range  Description: INTERVENTIONS:  - Monitor Blood Glucose as  ordered  - Assess for signs and symptoms of hyperglycemia and hypoglycemia  - Administer ordered medications to maintain glucose within target range  - Assess nutritional intake and initiate nutrition service referral as needed  Outcome: Progressing     Problem: HEMATOLOGIC - ADULT  Goal: Maintains hematologic stability  Description: INTERVENTIONS  - Assess for signs and symptoms of bleeding or hemorrhage  - Monitor labs  - Administer supportive blood products/factors as ordered and appropriate  Outcome: Progressing     Problem: MUSCULOSKELETAL - ADULT  Goal: Maintain or return mobility to safest level of function  Description: INTERVENTIONS:  - Assess patient's ability to carry out ADLs; assess patient's baseline for ADL function and identify physical deficits which impact ability to perform ADLs (bathing, care of mouth/teeth, toileting, grooming, dressing, etc.)  - Assess/evaluate cause of self-care deficits   - Assess range of motion  - Assess patient's mobility  - Assess patient's need for assistive devices and provide as appropriate  - Encourage maximum independence but intervene and supervise when necessary  - Involve family in performance of ADLs  - Assess for home care needs following discharge   - Consider OT consult to assist with ADL evaluation and planning for discharge  - Provide patient education as appropriate  Outcome: Progressing  Goal: Maintain proper alignment of affected body part  Description: INTERVENTIONS:  - Support, maintain and protect limb and body alignment  - Provide patient/ family with appropriate education  Outcome: Progressing     Problem: Prexisting or High Potential for Compromised Skin Integrity  Goal: Skin integrity is maintained or improved  Description: INTERVENTIONS:  - Identify patients at risk for skin breakdown  - Assess and monitor skin integrity  - Assess and monitor nutrition and hydration status  - Monitor labs   - Assess for incontinence   - Turn and reposition  patient  - Assist with mobility/ambulation  - Relieve pressure over bony prominences  - Avoid friction and shearing  - Provide appropriate hygiene as needed including keeping skin clean and dry  - Evaluate need for skin moisturizer/barrier cream  - Collaborate with interdisciplinary team   - Patient/family teaching  - Consider wound care consult   Outcome: Progressing

## 2024-12-16 NOTE — PHYSICAL THERAPY NOTE
"   PHYSICAL THERAPY TREATMENT NOTE  NAME:  Pal Ledbetter  DATE: 12/16/24    Length Of Stay: 2  Performed at least 2 patient identifiers during session: Name and Birthday    TREATMENT FLOW SHEET:    12/16/24 1033   PT Last Visit   PT Visit Date 12/16/24   Note Type   Note Type Treatment   Pain Assessment   Pain Assessment Tool 0-10   Pain Score No Pain   Restrictions/Precautions   Other Precautions Cognitive;Chair Alarm;Bed Alarm;Fall Risk   General   Chart Reviewed Yes   Response to Previous Treatment Patient with no complaints from previous session.   Cognition   Orientation Level Oriented to person;Oriented to place;Disoriented to time;Disoriented to situation  (partially tyo time with December, not year)   Following Commands Follows one step commands with increased time or repetition   Subjective   Subjective \"She's my girl.\"   Bed Mobility   Supine to Sit 5  Supervision   Additional items Increased time required;Verbal cues;HOB elevated;Bedrails   Additional Comments HOB elevated > 30 degrees, use of bedrails. inc time to complete.   Transfers   Sit to Stand 4  Minimal assistance   Additional items Assist x 1;Increased time required;Verbal cues   Stand to Sit 4  Minimal assistance   Additional items Assist x 1;Increased time required;Verbal cues   Stand pivot 4  Minimal assistance   Additional items Assist x 1;Increased time required;Verbal cues   Additional Comments RW. inc time to achieve standing. verbal cues for hand placement. slight posterior lean upon standing. spt with RW with minAx1 with min manual cues for balance and mod verbal cues to stay within NAOMY of RW when turning. standing dynamic balance reaching anteriorly outside NAOMY with minAx1, slight posteiror lean. pt wiht LOB turning stepping posteriorly with minAx1 to recover.   Ambulation/Elevation   Gait pattern Wide NAOMY;Improper Weight shift;Forward Flexion;Decreased foot clearance;Short stride;Excessively slow   Gait Assistance 4  Minimal " Medications: assist   Additional items Assist x 1;Verbal cues   Assistive Device Rolling walker   Distance ambulated 24'x2 and 85'x1 with RW with minAx1 with wide NAOMY, inc trunk flexion, decreased step length, foot clearance, min manual cues for wt shifting and balance, mod cues for staying within NAOMY of RW.   Balance   Static Sitting Good   Dynamic Sitting Fair +   Static Standing Fair -   Dynamic Standing Poor +   Ambulatory Poor +   Activity Tolerance   Activity Tolerance Patient limited by fatigue  (impaired cognition)   Nurse Made Aware RN, Peri   Exercises   Hip Flexion Sitting;10 reps;AROM;Bilateral  (2x10)   Knee AROM Long Arc Quad Sitting;10 reps;AROM;Bilateral  (2x10)   Ankle Pumps Sitting;10 reps;AROM;Bilateral  (2x10)   Assessment   Prognosis Good   Problem List Decreased strength;Decreased endurance;Impaired balance;Decreased mobility;Decreased cognition;Decreased safety awareness;Impaired judgement;Obesity;Decreased skin integrity;Pain   Barriers to Discharge Decreased caregiver support;Inaccessible home environment   Barriers to Discharge Comments inc fall risk, recent falls, safety concerns with impaired cognition   Goals   Patient Goals get better   Plan   Treatment/Interventions ADL retraining;Functional transfer training;LE strengthening/ROM;Elevations;Therapeutic exercise;Endurance training;Patient/family training;Equipment eval/education;Bed mobility;Gait training;Compensatory technique education;Spoke to nursing;Spoke to case management   Progress Slow progress, cognitive deficits   PT Frequency 3-5x/wk   Discharge Recommendation   Rehab Resource Intensity Level, PT II (Moderate Resource Intensity)   Equipment Recommended   (use of RW.)   Additional Comments should patient return to home with family, will require 24/7 physical assistance with all mobility and HHC, consistent use of RW   AM-PAC Basic Mobility Inpatient   Turning in Flat Bed Without Bedrails 3   Lying on Back to Sitting on Edge of Flat Bed  Without Bedrails 2   Moving Bed to Chair 3   Standing Up From Chair Using Arms 3   Walk in Room 3   Climb 3-5 Stairs With Railing 2   Basic Mobility Inpatient Raw Score 16   Basic Mobility Standardized Score 38.32   [facility] Highest Level Of Mobility   -HL Goal 5: Stand one or more mins   -HL Achieved 7: Walk 25 feet or more   Education   Education Provided Mobility training;Home exercise program;Assistive device   Patient Reinforcement needed   End of Consult   Patient Position at End of Consult Bedside chair;Bed/Chair alarm activated;All needs within reach         The patient's AM-PAC Basic Mobility Inpatient Short Form Raw Score is 16. A Raw score of less than or equal to 16 suggests the patient may benefit from discharge to post-acute rehabilitation services. Please also refer to the recommendation of the Physical Therapist for safe discharge planning.     Pt tolerated session fairly. He was able to tolerate increased activity this date, ambulate increased distances. He continues to be oriented to self. He requires cues for safety and technique with mobility and cues for attention to task. He is limited by decreased strength, balance, endurance and safety with impaired cognition. He will continue to benefit from PT services to maximize LOF.       Brissa Varma, PT,DPT

## 2024-12-16 NOTE — PLAN OF CARE
Problem: OCCUPATIONAL THERAPY ADULT  Goal: Performs self-care activities at highest level of function for planned discharge setting.  See evaluation for individualized goals.  Description: Treatment Interventions: ADL retraining, Functional transfer training, Cognitive reorientation, Endurance training, Patient/family training, Energy conservation, Activityengagement          See flowsheet documentation for full assessment, interventions and recommendations.   Note: Limitation: Decreased ADL status, Decreased UE strength, Decreased Safe judgement during ADL, Decreased cognition, Decreased endurance, Decreased self-care trans, Decreased high-level ADLs  Prognosis: Fair  Assessment: Pt is a 79 y.o. male, admitted to [facility] 12/14/2024 d/t experiencing recent falls and swelling in his legs. Dx: fall. Pt with PMHx impacting their performance during ADL tasks, including: DMII, HTN, L2 Vertebral fracture, CVA. Prior to admission to the hospital Pt was performing ADLs without physical assistance. IADLs with physical assistance. Functional transfers/ambulation without physical assistance. Cognitive status was PTA was impaired, and he was receiving 24/7 supervision from his family. OT order placed to assess Pt's ADLs, cognitive status, and performance during functional tasks in order to maximize safety and independence while making most appropriate d/c recommendations. PT/OT co-evaluation completed at this time d/t significant mobility deficits and safety concerns. Pt's clinical presentation is currently unstable/unpredictable given new onset deficits that effect Pt's occupational performance and ability to safely return to PLOF including decrease activity tolerance, decrease standing balance, decrease sitting balance, decrease performance during ADL tasks, decrease cognition, decrease safety awareness , decrease activity engagement, decrease performance during functional transfers, high fall risk, and limited insight to  deficits combined with medical complications of need for input for mobility technique/safety. Personal factors affecting Pt at time of initial evaluation include: step(s) to enter environment, multi-level environment, inability to perform IADLs, inability to perform ADLs, inability to ambulate household distances, inability to navigate community distances, limited insight into impairments, decreased initiation and engagement, and recent fall(s)/fall history. Pt will benefit from continued skilled OT services to address deficits as defined above and to maximize level independence/participation during ADLs and functional tasks to facilitate return toward PLOF and improved quality of life. From an occupational therapy standpoint, recommendation at time of d/c would be Level II: Moderate Resource Intensity.     Rehab Resource Intensity Level, OT: II (Moderate Resource Intensity)

## 2024-12-16 NOTE — UTILIZATION REVIEW
"Initial Clinical Review    Admission: Date/Time/Statement:   Admission Orders (From admission, onward)       Ordered        12/14/24 1704  INPATIENT ADMISSION  Once                          Orders Placed This Encounter   Procedures    INPATIENT ADMISSION     Standing Status:   Standing     Number of Occurrences:   1     Level of Care:   Med Surg [16]     Estimated length of stay:   More than 2 Midnights     Certification:   I certify that inpatient services are medically necessary for this patient for a duration of greater than two midnights. See H&P and MD Progress Notes for additional information about the patient's course of treatment.     ED Arrival Information       Expected   -    Arrival   12/14/2024 14:59    Acuity   Emergent              Means of arrival   Ambulance    Escorted by   [facility]    Service   Hospitalist    Admission type   Emergency              Arrival complaint   falls             Chief Complaint   Patient presents with    Multiple Falls     Pt arrives from home via EMS with c/o multiple falls recently due to not using his cane. Pt recently seen at other hospital for same. Left foot discolored, cold with pitting edema for \"awhile\"        Initial Presentation: 79 y.o. male to the ED from home via EMS with complaints of frequent falls, LLE edema.  Admitted to inpatient for fall, leg swelling.  H/O htn, DMII, CVA.  Has been forgetful at times. On arrival, GCs 15, left >right lower extremity swelling, cold feet, hard to feel pulses.  Give IV lasix, Check ECHo.  Check arterial/venous duplex. Start on IV abx for cellulitis.     Anticipated Length of Stay/Certification Statement: Patient will be admitted on an inpatient basis with an anticipated length of stay of greater than 2 midnights secondary to falls .       Date: 12/15   Day 2:   Leg edema improved since IV lasix. Continue with IV abx for possible cellulitis.     Date: 12/16  Day 3: Has surpassed a 2nd midnight with active " treatments and services. Initially admitted for leg swelling, has been receiving iv lasix and started on IV abx.  Venous duplex negative for DVT triphasic blood flow bilaterally.  2D echo- Left Ventricle: Left ventricular cavity size is normal. Wall thickness is mildly increased. The left ventricular ejection fraction is 65%. Systolic function is normal. Wall motion is normal. Diastolic function is mildly abnormal, consistent with grade I (abnormal) relaxation.Swelling (Left greater than right with significant improvement it is just a +1 and feet are actually warm)             Scheduled Medications:  aspirin, 81 mg, Oral, Daily  atorvastatin, 40 mg, Oral, Daily With Dinner  cefazolin, 2,000 mg, Intravenous, Q8H  heparin (porcine), 5,000 Units, Subcutaneous, Q8H PHUC  insulin glargine, 30 Units, Subcutaneous, QAM  insulin lispro, 2-12 Units, Subcutaneous, TID AC  levothyroxine, 125 mcg, Oral, Early Morning  losartan, 50 mg, Oral, Daily  nystatin, , Topical, BID  pantoprazole, 40 mg, Oral, Early Morning      Continuous IV Infusions:     PRN Meds:  acetaminophen, 650 mg, Oral, Q6H PRN      ED Triage Vitals   Temperature Pulse Respirations Blood Pressure SpO2 Pain Score   12/14/24 1504 12/14/24 1504 12/14/24 1504 12/14/24 1507 12/14/24 1504 12/14/24 1805   97.7 °F (36.5 °C) 65 16 161/82 93 % No Pain     Weight (last 2 days)       Date/Time Weight    12/16/24 0516 115 (252.87)    12/14/24 1504 119 (261.69)            Vital Signs (last 3 days)       Date/Time Temp Pulse Resp BP MAP (mmHg) SpO2 O2 Device Patient Position - Orthostatic VS [place] Coma Scale Score Pain    12/16/24 07:23:54 97.3 °F (36.3 °C) 62 18 168/85 113 95 % -- -- -- --    12/15/24 23:01:35 97.7 °F (36.5 °C) 75 18 144/64 91 96 % -- -- -- --    12/15/24 2000 -- -- -- -- -- 96 % None (Room air) -- 14 No Pain    12/15/24 15:03:21 97.2 °F (36.2 °C) 74 18 145/85 105 96 % -- -- -- --    12/15/24 0900 -- -- -- -- -- -- None (Room air) -- 14 No Pain    12/15/24  07:11:17 97.5 °F (36.4 °C) 69 18 135/63 87 97 % -- -- -- --    12/15/24 0057 -- -- -- -- -- 95 % None (Room air) -- 14 No Pain    12/14/24 21:47:16 97.3 °F (36.3 °C) 87 16 160/67 98 97 % -- -- -- --    12/14/24 1900 -- -- -- -- -- -- None (Room air) -- 14 No Pain    12/14/24 1805 -- -- -- -- -- -- -- -- -- No Pain    12/14/24 17:53:12 97.2 °F (36.2 °C) 79 18 146/76 99 97 % -- -- -- --    12/14/24 1700 -- 72 17 180/88 127 98 % None (Room air) Lying -- --    12/14/24 1607 -- 66 18 159/83 114 97 % None (Room air) Lying -- --    12/14/24 1522 -- -- -- -- -- -- None (Room air) -- 14 --    12/14/24 1507 -- -- -- 161/82 115 -- -- -- -- --    12/14/24 1504 97.7 °F (36.5 °C) 65 16 -- -- 93 % None (Room air) -- -- --              Pertinent Labs/Diagnostic Test Results:   Radiology:  CT head without contrast   Final Interpretation by Cruz Molina MD (12/14 1656)      No acute intracranial abnormality.                  Workstation performed: [id number]         XR chest 1 view portable   ED Interpretation by Johnny Lowry DO (12/14 1601)   Elevated left hemidiaphragm      Final Interpretation by Padma Prabhakar MD (12/14 2149)      No acute cardiopulmonary disease.            Workstation performed: [id number]          VAS VENOUS DUPLEX - LOWER LIMB BILATERAL    (Results Pending)     Cardiology:  ECG 12 lead   Final Result by David García MD (12/15 0167)   Sinus rhythm with Premature atrial complexes   Right bundle branch block   Abnormal ECG   When compared with ECG of 13-Sep-2024 19:46,   Premature ventricular complexes are no longer Present   Questionable change in QRS axis   Confirmed by David García ([id number]) on 12/15/2024 5:57:20 PM              Results from last 7 days   Lab Units 12/14/24  1520 12/10/24  1931   WBC Thousand/uL 7.49 8.03   HEMOGLOBIN g/dL 14.9 15.2   HEMATOCRIT % 45.5 46.2   PLATELETS Thousands/uL 190 197   TOTAL NEUT ABS Thousands/µL 5.28 5.94         Results from last 7 days   Lab Units  12/16/24  0522 12/15/24  0436 12/14/24  1520 12/10/24  1931   SODIUM mmol/L 135 133* 132* 136   POTASSIUM mmol/L 4.1 4.0 4.4 4.4   CHLORIDE mmol/L 99 101 98 102   CO2 mmol/L 29 27 26 27   ANION GAP mmol/L 7 5 8 7   BUN mg/dL 26* 25 25 27*   CREATININE mg/dL 1.26 1.15 1.02 1.15   EGFR ml/min/1.73sq m 53 60 69 60   CALCIUM mg/dL 8.9 8.7 8.9 9.1     Results from last 7 days   Lab Units 12/14/24  1520 12/10/24  1931   AST U/L 23 24   ALT U/L 20 20   ALK PHOS U/L 83 79   TOTAL PROTEIN g/dL 6.6 6.6   ALBUMIN g/dL 3.7 3.8   TOTAL BILIRUBIN mg/dL 1.32* 1.22*     Results from last 7 days   Lab Units 12/16/24  0720 12/15/24  1608 12/15/24  1042 12/15/24  0744 12/15/24  0712 12/14/24  2127 12/14/24  1832   POC GLUCOSE mg/dl 97 157* 225* 80 71 163* 104     Results from last 7 days   Lab Units 12/16/24  0522 12/15/24  0436 12/14/24  1520 12/10/24  1931   GLUCOSE RANDOM mg/dL 91 82 123 95             BETA-HYDROXYBUTYRATE   Date Value Ref Range Status   08/29/2023 0.2 <0.6 mmol/L Final            Results from last 7 days   Lab Units 12/14/24  1723 12/14/24  1520   HS TNI 0HR ng/L  --  9   HS TNI 2HR ng/L 11  --    HSTNI D2 ng/L 2  --          Results from last 7 days   Lab Units 12/10/24  1931   PROTIME seconds 13.4   INR  0.98   PTT seconds 30     Results from last 7 days   Lab Units 12/15/24  0436   TSH 3RD GENERATON uIU/mL 4.189         Results from last 7 days   Lab Units 12/10/24  1931   LACTIC ACID mmol/L 1.5             Results from last 7 days   Lab Units 12/14/24  1520   BNP pg/mL 28           Results from last 7 days   Lab Units 12/10/24  1931   CRP mg/L 8.7*       Results from last 7 days   Lab Units 12/10/24  1931   BLOOD CULTURE  No Growth After 4 Days.  No Growth After 4 Days.       Past Medical History:   Diagnosis Date    Diabetes mellitus (HCC)     Hypertension     L2 vertebral fracture (HCC)     Stricture esophagus     Stroke (cerebrum) (HCC) 08/29/2023         Admitting Diagnosis: Venous stasis  [I87.8]  Generalized weakness [R53.1]  Frequent falls [R29.6]  Ambulatory dysfunction [R26.2]  Unspecified multiple injuries, initial encounter [T07.XXXA]  Age/Sex: 79 y.o. male    Network Utilization Review Department  ATTENTION: Please call with any questions or concerns to [phone] and carefully listen to the prompts so that you are directed to the right person. All voicemails are confidential.   For Discharge needs, contact Care Management DC Support Team at [phone] opt. 2  Send all requests for admission clinical reviews, approved or denied determinations and any other requests to dedicated fax number below belonging to the campus where the patient is receiving treatment. List of dedicated fax numbers for the Facilities:  FACILITY NAME UR FAX NUMBER   ADMISSION DENIALS (Administrative/Medical Necessity) [phone]   DISCHARGE SUPPORT TEAM (NETWORK) [phone]   PARENT CHILD HEALTH (Maternity/NICU/Pediatrics) [phone]   [facility] [phone]   [facility] [phone]   [facility] [phone]   General acute hospital [phone]   [facility] [phone]   [facility] [phone]   [facility] [phone]   [facility] [phone]   [facility] [phone]   [facility] [phone]   [facility] [phone]   [facility] [phone]

## 2024-12-16 NOTE — PROGRESS NOTES
Patient:  OYLIS CANTRELL    MRN:  [id number]    Aidin Request ID:  [id number]    Level of care reserved:  Home Health Agency    Partner Reserved:  [facility] [place] [address] [phone]    Clinical needs requested:  physical therapy, occupational therapy, skilled nursing    Geography searched:  [id number]    Start of Service:    Request sent:  8:52am EST on 12/16/2024 by Norma Tapia    Partner reserved:  10:16am EST on 12/16/2024 by Norma Tapia    Choice list shared:  10:15am EST on 12/16/2024 by Norma Tapia

## 2024-12-16 NOTE — DISCHARGE INSTR - AVS FIRST PAGE
Start lasix 12/17  Compression stockings or ace wraps while awake  Bmp - blood work ordered to be done Friday check electrolytes and kidney function after starting water pill

## 2024-12-16 NOTE — CASE MANAGEMENT
Case Management Discharge Planning Note    Patient name Pal Ledbetter  Location /-01 MRN [id number]  : 1945 Date 2024       Current Admission Date: 2024  Current Admission Diagnosis:Fall   Patient Active Problem List    Diagnosis Date Noted Date Diagnosed    Cellulitis of left lower extremity 2024     Fall 2024     Leg swelling 2024     HTN (hypertension) 2024     Eosinophilic esophagitis      Acute CVA (cerebrovascular accident) (HCC) 2023     Acquired hypothyroidism 2023     CKD (chronic kidney disease) 2023     Thyroid nodule 2023     Penetrating atherosclerotic ulcer of aorta (HCC) 2023     Hyperlipidemia 2023     Foreign body sensation in throat 2023     Type 2 diabetes mellitus with hyperglycemia, with long-term current use of insulin ([facility])      Hypertensive urgency        LOS (days): 2  Geometric Mean LOS (GMLOS) (days): 3.1  Days to GMLOS:1.4     OBJECTIVE:  Risk of Unplanned Readmission Score: 14.63         Current admission status: Inpatient   Preferred Pharmacy:   [id number] - [place], [place] - [address]  [address] [place] [address]  Phone: [phone] Fax: [phone]    [id number] - [address] [address]  Phone: [phone] Fax: [phone]    Primary Care Provider: Joseph Rinaldi MD    Primary Insurance: [facility]  Secondary Insurance:     DISCHARGE DETAILS:    Discharge planning discussed with:: Patient, son Juma  Freedom of Choice: Yes  Comments - Freedom of Choice: Refusing STR - agreeable to [facility]  CM contacted family/caregiver?: Yes  Were Treatment Team discharge recommendations reviewed with patient/caregiver?: Yes  Did patient/caregiver verbalize understanding of patient care needs?: Yes  Were patient/caregiver advised of the risks associated with not following Treatment Team  discharge recommendations?: Yes    Contacts  Patient Contacts: alejo Del Cid  Relationship to Patient:: Family  Contact Method: Phone  Phone Number: [phone]  Reason/Outcome: Discharge Planning    Requested Home Health Care         Is the patient interested in [facility] at discharge?: Yes  Home Health Discipline requested:: Nursing, Occupational Therapy, Physical Therapy  [facility] Reason (only applicable if external HHA name selected): Patient has established relationship with provider  Home Health Follow-Up Provider:: PCP  Home Health Services Needed:: Evaluate Functional Status and Safety, Wound/Ostomy Care, Diabetes Management, Strengthening/Theraputic Exercises to Improve Function, Gait/ADL Training  Homebound Criteria Met:: Requires the Assistance of Another Person for Safe Ambulation or to Leave the Home, Uses an Assist Device (i.e. cane, walker, etc), Psychological Issues  Supporting Clincal Findings:: Cognitive Deficit Requiring the Assistance of Others, Limited Endurance, Fatigues Easliy in Short Distances    DME Referral Provided  Referral made for DME?: No    Other Referral/Resources/Interventions Provided:  Interventions: C  Referral Comments: [facility]    Would you like to participate in our [facility] service program?  : No - Declined    Treatment Team Recommendation: Short Term Rehab  Discharge Destination Plan:: Home with Home Health Care  Transport at Discharge : Family                             IMM Given (Date):: 12/16/24  IMM Given to:: Patient  Family notified:: Reviewed with son Juma by phone            CM reviewed IMM with pt bedside/son by phone; No questions or concerns. Pt in agreement with rights, and is aware of the right to appeal d/c once medically stable if desired. IMM signed and IMM documents convo by phone.  CM confirmed the family not agreeable to STR at this time but agreeable to [facility].   A post  acute care recommendation was made by your care team for HHC.  Discussed Freedom of Choice with both patient and caregiver. List of agencies given to both patient and caregiver via in person. both patient and caregiver aware the list is custom filtered for them by preference  and that [facility] post acute providers are designated. [facility] is choice and reserved in Aidin with a SOC for Friday.     CM to follow patient's care and discharge needs.

## 2024-12-16 NOTE — ASSESSMENT & PLAN NOTE
Left > right left has been for months previous had venous duplex and was negative  No open wounds to suggest osteo  Some shin cellulitis  Has PVD -     Tsh normal  Echo -normal EF and just grade 1 diastolic dysfunction  He had a venous duplex done negative for acute DVT and has triphasic blood flow no need for arterial duplex.  Legs are warm today status post 2 doses of IV Lasix with great diuresis and leg edema improvement discussed with son that he would benefit to be on a low-dose Lasix at home hence we will start Lasix 20 mg daily will do a BMP Friday to check electrolytes and renal function.  Also discussed in terms of compression stockings versus Ace bandages they will be provided to him as an outpatient.  Continue with the previous antibiotic that was given to him oral  Ancef for cellulitis

## 2024-12-16 NOTE — ASSESSMENT & PLAN NOTE
Recurrent falls  Sons think 2/2 to leg swelling  May need rehab  Pt/ot recommendations level 2 son has decided to take the patient home as he has 24/7 care and chose [facility]  Imaging /labs ok

## 2024-12-16 NOTE — DISCHARGE SUMMARY
Discharge Summary - Hospitalist   Name: Pal Ledbetter 79 y.o. male I MRN: [id number]  Unit/Bed#: -01 I Date of Admission: 12/14/2024   Date of Service: 12/16/2024 I Hospital Day: 2     Assessment & Plan  Fall  Recurrent falls  Sons think 2/2 to leg swelling  May need rehab  Pt/ot recommendations level 2 son has decided to take the patient home as he has 24/7 care and chose [facility]  Imaging /labs ok   Type 2 diabetes mellitus with hyperglycemia, with long-term current use of insulin ([facility])  Lab Results   Component Value Date    HGBA1C 7 (H) 09/19/2024       Recent Labs     12/15/24  1042 12/15/24  1608 12/16/24  0720 12/16/24  1036   POCGLU 225* 157* 97 195*       Blood Sugar Average: Last 72 hrs:  (P) 136.5  iss and Tresiba 70 units at home resume his home outpatient med regimen    Leg swelling  Left > right left has been for months previous had venous duplex and was negative  No open wounds to suggest osteo  Some shin cellulitis  Has PVD -     Tsh normal  Echo -normal EF and just grade 1 diastolic dysfunction  He had a venous duplex done negative for acute DVT and has triphasic blood flow no need for arterial duplex.  Legs are warm today status post 2 doses of IV Lasix with great diuresis and leg edema improvement discussed with son that he would benefit to be on a low-dose Lasix at home hence we will start Lasix 20 mg daily will do a BMP Friday to check electrolytes and renal function.  Also discussed in terms of compression stockings versus Ace bandages they will be provided to him as an outpatient.  Continue with the previous antibiotic that was given to him oral  Ancef for cellulitis   HTN (hypertension)  Continue losartan   Cellulitis of left lower extremity  Lower extremity cellulitis due to diabetes, POA, in patient with known history of DM, evidenced by LE edema/erythema, requiring treatment with IV Cefazolin.  Improved resume outpatient antibiotic     Medical Problems       Resolved Problems  Date  Reviewed: 12/15/2024   None         MESSAGE TO PCP (Joseph Rinaldi MD) FOR FOLLOW UP:   Thank you for allowing us to participate in the care of your patient, Pal Ledbetter, who was hospitalized from 12/14/2024 through 12/16/24 with the admitting diagnosis of cellulitis of left shin and also bilateral lower extremity swelling secondary to venous stasis falls.      Medication Changes:  Lasix 20 mg daily  Outpatient testing recommended:  none  If you have any additional questions or would like to discuss further, please feel free to contact me.  Maria Esther Kilpatrick MD  [facility] Internal Medicine, Hospitalist, [phone]     Admission Date:   Admission Orders (From admission, onward)       Ordered        12/14/24 1704  INPATIENT ADMISSION  Once                          Discharge Date: 12/16/24    Consultations During Hospital Stay:  None    Procedures Performed:   none    Significant Findings / Test Results:   Chest x-ray is normal-   CT brain-negative for acute abnormality he does have some advanced microangiopathic changes  Venous duplex negative for DVT triphasic blood flow bilaterally  2D echo- Left Ventricle: Left ventricular cavity size is normal. Wall thickness is mildly increased. The left ventricular ejection fraction is 65%. Systolic function is normal. Wall motion is normal. Diastolic function is mildly abnormal, consistent with grade I (abnormal) relaxation.    Aortic Valve: There is mild regurgitation.    Aorta: Aortic root is mildly enlarged.  Ascending aorta is moderately enlarged.    No significant change from 2023 study report.  Incidental Findings:   See above to be followed by PCP    Test Results Pending at Discharge (will require follow up):   None     Complications: None    Reason for Admission: Recurrent falls worsening leg swelling    Hospital Course:   Pal Ledbetter is a 79 y.o. male patient who originally presented to the hospital on 12/14/2024 due to recurrent falls and patient does  have a chronic left lower extremity right lower extremity edema but it has been, worse recently given antibiotic at the ER visit.  Chest x-ray CT brain negative none focal exam.  Patient was diuresed with 2 doses of diarrhea Reddix and his leg edema has improved tremendously venous duplex was negative for acute DVT and had a triphasic blood flow 2D echo which shows normal EF with grade 1 diastolic dysfunction I did discuss with the son that he would benefit from a low-dose diuretic to keep his venous stasis swelling down which I will prescribe Lasix 20 mg daily will check blood work within the weeks for his electrolytes and renal function.  Patient was evaluated by PT OT recommendation was level 2 but the son decided to take patient home with [facility] and sons to provide 24/7 care and stay with him overnight.  Cellulitis improved finish antibiotic from previous outpatient visit otherwise follow-up with PCP 5 to 7 days medical cleared to be discharged home labs are stable.        Please see above list of diagnoses and related plan for additional information.     Condition at Discharge: stable    Discharge Day Visit / Exam:   Subjective: Patient seen and examined denies any complaints  Vitals: Blood Pressure: 168/85 (12/16/24 0900)  Pulse: 65 (12/16/24 0900)  Temperature: (!) 97.3 °F (36.3 °C) (12/16/24 0723)  Temp Source: Temporal (12/14/24 1504)  Respirations: 18 (12/16/24 0723)  Height: 6' (182.9 cm) (12/16/24 0900)  Weight - Scale: 114 kg (252 lb) (12/16/24 0900)  SpO2: 95 % (12/16/24 0723)  Physical Exam  Vitals and nursing note reviewed.   Constitutional:       General: He is not in acute distress.     Appearance: He is well-developed.   HENT:      Head: Normocephalic and atraumatic.   Eyes:      Conjunctiva/sclera: Conjunctivae normal.   Cardiovascular:      Rate and Rhythm: Normal rate and regular rhythm.      Heart sounds: No murmur heard.  Pulmonary:      Effort: Pulmonary effort is normal. No respiratory  distress.      Breath sounds: Normal breath sounds. No wheezing or rales.   Abdominal:      General: There is no distension.      Palpations: Abdomen is soft.      Tenderness: There is no abdominal tenderness.   Musculoskeletal:         General: Swelling (Left greater than right with significant improvement it is just a +1 and feet are actually warm) present.      Cervical back: Neck supple.   Skin:     General: Skin is warm and dry.      Capillary Refill: Capillary refill takes less than 2 seconds.   Neurological:      General: No focal deficit present.      Mental Status: He is alert.      Comments: Patient is awake alert and oriented to self he thinks is a good [facility] but he knows it is December and 2024 he knows it but the holidays coming up he knows how many sons he has he is answering questions appropriately   Psychiatric:         Mood and Affect: Mood normal.          Discussion with Family: Updated  (son) via phone.    Discharge instructions/Information to patient and family:   See after visit summary for information provided to patient and family.      Provisions for Follow-Up Care:  See after visit summary for information related to follow-up care and any pertinent home health orders.      Mobility at time of Discharge:   Basic Mobility Inpatient Raw Score: 16  JH-HLM Goal: 5: Stand one or more mins  JH-HLM Achieved: 7: Walk 25 feet or more  HLM Goal achieved. Continue to encourage appropriate mobility.     Disposition:   Home with VNA Services (Reminder: Complete face to face encounter)    Planned Readmission: Anticipate no    Discharge Medications:  See after visit summary for reconciled discharge medications provided to patient and/or family.      Administrative Statements   Discharge Statement:  I have spent a total time of >35 minutes in caring for this patient on the day of the visit/encounter. >30 minutes of time was spent on: Patient and family education, Documenting in the  medical record, Reviewing / ordering tests, medicine, procedures  , and Communicating with other healthcare professionals .    **Please Note: This note may have been constructed using a voice recognition system**

## 2024-12-17 ENCOUNTER — TELEPHONE (OUTPATIENT)
Facility: CLINIC | Age: 79
End: 2024-12-17

## 2024-12-17 NOTE — UTILIZATION REVIEW
NOTIFICATION OF ADMISSION DISCHARGE   This is a Notification of Discharge from [facility]. Please be advised that this patient has been discharge from our facility. Below you will find the admission and discharge date and time including the patient’s disposition.   UTILIZATION REVIEW CONTACT:  Bianka Alcantara  Utilization   Network Utilization Review Department  Phone: [phone] x carefully listen to the prompts. All voicemails are confidential.  Email: [email or web address]     ADMISSION INFORMATION  PRESENTATION DATE: 12/14/2024  2:59 PM  OBERVATION ADMISSION DATE: N/A  INPATIENT ADMISSION DATE: 12/14/24  5:04 PM   DISCHARGE DATE: 12/16/2024  4:23 PM   DISPOSITION:Home/Self Care    Network Utilization Review Department  ATTENTION: Please call with any questions or concerns to [phone] and carefully listen to the prompts so that you are directed to the right person. All voicemails are confidential.   For Discharge needs, contact Care Management DC Support Team at [phone] opt. 2  Send all requests for admission clinical reviews, approved or denied determinations and any other requests to dedicated fax number below belonging to the campus where the patient is receiving treatment. List of dedicated fax numbers for the Facilities:  FACILITY NAME UR FAX NUMBER   ADMISSION DENIALS (Administrative/Medical Necessity) [phone]   DISCHARGE SUPPORT TEAM ([facility]) [phone]   PARENT CHILD HEALTH (Maternity/NICU/Pediatrics) [phone]   [facility] [phone]   [facility] [phone]   [facility] [phone]   [facility] [phone]   [facility] [phone]   [facility] [phone]   [facility] [phone]   [facility]  [phone]   [facility] [phone]   Critical access hospital [phone]   [facility] [phone]   [facility] [place] [phone]

## 2024-12-17 NOTE — TELEPHONE ENCOUNTER
Called to Schedule an appt however, his son Juma said that he has an appt with the surgeon/foot Dr. He will call to schedule if the Dr advises it.

## 2024-12-20 ENCOUNTER — OFFICE VISIT (OUTPATIENT)
Dept: PODIATRY | Facility: CLINIC | Age: 79
End: 2024-12-20

## 2024-12-20 VITALS
HEART RATE: 73 BPM | OXYGEN SATURATION: 99 % | BODY MASS INDEX: 34.13 KG/M2 | WEIGHT: 252 LBS | SYSTOLIC BLOOD PRESSURE: 132 MMHG | RESPIRATION RATE: 16 BRPM | DIASTOLIC BLOOD PRESSURE: 75 MMHG | TEMPERATURE: 97.9 F | HEIGHT: 72 IN

## 2024-12-20 DIAGNOSIS — I73.9 PVD (PERIPHERAL VASCULAR DISEASE) (HCC): Primary | ICD-10-CM

## 2024-12-20 DIAGNOSIS — L85.3 XEROSIS OF SKIN: ICD-10-CM

## 2024-12-20 DIAGNOSIS — B35.1 ONYCHOMYCOSIS: ICD-10-CM

## 2024-12-20 DIAGNOSIS — L03.90 CELLULITIS: ICD-10-CM

## 2024-12-20 DIAGNOSIS — I87.8 VENOUS STASIS OF BOTH LOWER EXTREMITIES: ICD-10-CM

## 2024-12-20 DIAGNOSIS — M79.89 LEFT LEG SWELLING: ICD-10-CM

## 2024-12-20 RX ORDER — INSULIN LISPRO 100 U/ML
INJECTION, SOLUTION SUBCUTANEOUS
COMMUNITY
Start: 2024-09-18

## 2024-12-20 NOTE — PROGRESS NOTES
Name: Pal Ledbetter      : 1945      MRN: [id number]  Encounter Provider: Dior Chinchilla DPM  Encounter Date: 2024   Encounter department: [facility] PODIATRY [place]  :  Assessment & Plan  Venous stasis of both lower extremities    Orders:    Ambulatory Referral to Podiatry    Left leg swelling    Orders:    Ambulatory Referral to Podiatry    Cellulitis    Orders:    Ambulatory Referral to Podiatry    PVD (peripheral vascular disease) ([facility])    Orders:    VAS ARTERIAL DUPLEX- LOWER LIMB BILATERAL; Future    Onychomycosis         Xerosis of skin         Imaging Reviewed at this visit (I personally reviewed/independently interpreted images and reports in PACS)  XR ... foot WB 3v today's date: No acute osseous abnormalities noted.  No acute fracture or dislocation noted.        IMPRESSION:  DM 2 A1c 7% 2024  Bilateral venous stasis  PVD  Onychomycosis  Xerosis    PLAN:    Hospitalization notes from 2024 reviewed  No cellulitis noted at today's evaluation  Patient and family counseled at length on bilateral venous stasis and lower extremity swelling.  Patient and family counseled that venous stasis and diabetes puts patient at increased risk of infection and wound complications  Patient and family counseled on lower extremity elevation to decrease swelling.  Refrain from compression at this time until vascular studies can be obtained  Possible mixed venous and arterial disease present  Rx noninvasive vascular studies for arterial evaluation  Patient encouraged to continue Lasix as directed by his PCP to help with lower extremity edema  Patient family educated on dry skin and need to maintain skin barrier.  They should apply lotion several times a week.  Recommended diabetic moisturizers  Elongated, painful, onychomycotic nails thinned and shortened to patient's tolerance without incident using large nail nipper and electric bur x 10.  Patient tolerated procedure well.  We will  continue with palliative debridements  Patient will follow-up in 10 weeks for routine footcare.  They will contact the office sooner if any questions or concerns arise      History of Present Illness   HPI  Pal Ledbetter is a 79 y.o. male who presents for evaluation and management of bilateral lower extremities.  Patient was recently hospitalized due to lower extremity swelling and concern for DVT.  DVT was negative according to family.  They were told to follow-up with wound care or podiatry for further evaluation and management.  Patient's family denies any open wounds at this time.  They state that his lower extremity swelling is stabilized with the use of Lasix.  They present today for further evaluation and management recommendations      Review of Systems   Constitutional:  Negative for chills and fever.   Respiratory:  Negative for chest tightness.    Cardiovascular:  Positive for leg swelling.   Skin:  Positive for color change.          Objective   /75 (Patient Position: Sitting, Cuff Size: Large)   Pulse 73   Temp 97.9 °F (36.6 °C) (Temporal)   Resp 16   Ht 6' (1.829 m)   Wt 114 kg (252 lb)   SpO2 99%   BMI 34.18 kg/m²      Physical Exam  Cardiovascular:      Pulses: Pulses are weak.           Dorsalis pedis pulses are 0 on the right side and 0 on the left side.        Posterior tibial pulses are 0 on the right side and 0 on the left side.   Feet:      Right foot:      Skin integrity: Skin breakdown and dry skin present. No ulcer, erythema, warmth or callus.      Left foot:      Skin integrity: Skin breakdown and dry skin present. No ulcer, erythema, warmth or callus.       Diabetic Foot Exam    Patient's shoes and socks removed.    Right Foot/Ankle   Right Foot Inspection  Skin Exam: skin normal, skin intact, dry skin and maceration. No warmth, no callus, no erythema, no abnormal color, no pre-ulcer, no ulcer and no callus.     Toe Exam: swelling.     Sensory   Monofilament testing:  absent    Vascular  Capillary refills: elevated  The right DP pulse is 0. The right PT pulse is 0.     Left Foot/Ankle  Left Foot Inspection  Skin Exam: skin normal, skin intact, dry skin and maceration. No warmth, no erythema, normal color, no pre-ulcer, no ulcer and no callus.     Toe Exam: swelling.     Sensory   Monofilament testing: absent    Vascular  Capillary refills: < 3 seconds  The left DP pulse is 0. The left PT pulse is 0.     Assign Risk Category  No deformity present  Loss of protective sensation  Weak pulses  Risk: 2

## 2024-12-30 ENCOUNTER — HOSPITAL ENCOUNTER (OUTPATIENT)
Facility: CLINIC | Age: 79
Discharge: HOME | End: 2024-12-30
Payer: COMMERCIAL

## 2024-12-30 DIAGNOSIS — I73.9 PVD (PERIPHERAL VASCULAR DISEASE) (HCC): ICD-10-CM

## 2024-12-30 PROCEDURE — 93925 LOWER EXTREMITY STUDY: CPT

## 2024-12-30 PROCEDURE — 93922 UPR/L XTREMITY ART 2 LEVELS: CPT | Performed by: SURGERY

## 2024-12-30 PROCEDURE — 93925 LOWER EXTREMITY STUDY: CPT | Performed by: SURGERY

## 2024-12-30 PROCEDURE — 93923 UPR/LXTR ART STDY 3+ LVLS: CPT

## 2025-02-28 ENCOUNTER — OFFICE VISIT (OUTPATIENT)
Dept: PODIATRY | Facility: CLINIC | Age: 80
End: 2025-02-28
Payer: COMMERCIAL

## 2025-02-28 VITALS
WEIGHT: 252 LBS | OXYGEN SATURATION: 98 % | HEIGHT: 72 IN | HEART RATE: 73 BPM | BODY MASS INDEX: 34.13 KG/M2 | RESPIRATION RATE: 16 BRPM | TEMPERATURE: 97 F

## 2025-02-28 DIAGNOSIS — B35.1 ONYCHOMYCOSIS: ICD-10-CM

## 2025-02-28 DIAGNOSIS — I87.8 VENOUS STASIS OF BOTH LOWER EXTREMITIES: ICD-10-CM

## 2025-02-28 DIAGNOSIS — I73.9 PVD (PERIPHERAL VASCULAR DISEASE) (HCC): ICD-10-CM

## 2025-02-28 DIAGNOSIS — R23.4 FISSURE IN SKIN OF RIGHT FOOT: Primary | ICD-10-CM

## 2025-02-28 PROCEDURE — 11721 DEBRIDE NAIL 6 OR MORE: CPT | Performed by: PODIATRIST

## 2025-02-28 PROCEDURE — 99213 OFFICE O/P EST LOW 20 MIN: CPT | Performed by: PODIATRIST

## 2025-02-28 RX ORDER — MUPIROCIN 20 MG/G
OINTMENT TOPICAL DAILY
Qty: 22 G | Refills: 1 | Status: SHIPPED | OUTPATIENT
Start: 2025-02-28

## 2025-02-28 NOTE — PROGRESS NOTES
Right heel fissure  Mupirocin  Offloading pad  Shoes  Utilize Vaseline bilateral feet  Follow-up 2 weeks  Reviewed vascular studies  Routine footcare    Name: Pal Ledbetter      : 1945      MRN: [id number]  Encounter Provider: Dior Chinchilla DPM  Encounter Date: 2025   [facility]  :  Assessment & Plan  Fissure in skin of right foot    Orders:  •  mupirocin (BACTROBAN) 2 % ointment; Apply topically daily    Venous stasis of both lower extremities         PVD (peripheral vascular disease) (H[facility])         Onychomycosis           MPRESSION:  DM 2 A1c 7% 2024  Bilateral venous stasis  PVD  Onychomycosis  Xerosis  RLE heel fissure without SOI     PLAN:       Patient and family counseled at length on bilateral venous stasis and lower extremity swelling.  Patient and family counseled that venous stasis and diabetes puts patient at increased risk of infection and wound complications  Patient and family counseled on lower extremity elevation to decrease swelling.    Reviewed LEADs 2024.  No evidence of significant lower extremity arterial occlusive disease bilaterally.  RLE GT 61 mmHg, LLE GT 65 mmHg within healing range  Possible mixed venous and arterial disease present  Patient encouraged to continue Lasix as directed by his PCP to help with lower extremity edema  Right heel fissure without SOI.  Hyperkeratotic tissue debrided.  Patient counseled on use of occlusive and keratolytic moisturizers  Rx mupirocin 2% ointment for application daily  Elongated, painful, onychomycotic nails thinned and shortened to patient's tolerance without incident using large nail nipper and electric bur x 10.  Patient tolerated procedure well.  We will continue with palliative debridements  Follow-up 2 weeks for reevaluation of heel fissure    History of Present Illness   HPI  Pal Ledbetter is a 79 y.o. male who presents for diabetic footcare and painful onychomycotic  nails.  Patient also presents with heel fissure.  No significant edema or erythema reported.  Nausea, vomiting, fever, chills, shortness of breath      Review of Systems  Constitutional:  Negative for chills and fever.   Respiratory:  Negative for chest tightness.    Cardiovascular:  Positive for leg swelling.   Skin:  Positive for color change.        Objective   Pulse 73   Temp (!) 97 °F (36.1 °C)   Resp 16   Ht 6' (1.829 m)   Wt 114 kg (252 lb)   SpO2 98%   BMI 34.18 kg/m²      Physical Exam  Skin:     Comments: Right posterior heel fissure with granular base.  No surrounding erythema or edema.  No malodor

## 2025-03-19 ENCOUNTER — OFFICE VISIT (OUTPATIENT)
Dept: PODIATRY | Facility: CLINIC | Age: 80
End: 2025-03-19
Payer: COMMERCIAL

## 2025-03-19 VITALS
RESPIRATION RATE: 16 BRPM | BODY MASS INDEX: 34.13 KG/M2 | HEART RATE: 74 BPM | OXYGEN SATURATION: 98 % | WEIGHT: 252 LBS | HEIGHT: 72 IN | TEMPERATURE: 98.7 F

## 2025-03-19 DIAGNOSIS — R23.4 FISSURE IN SKIN OF RIGHT FOOT: ICD-10-CM

## 2025-03-19 DIAGNOSIS — L85.3 XEROSIS OF SKIN: Primary | ICD-10-CM

## 2025-03-19 PROCEDURE — 99212 OFFICE O/P EST SF 10 MIN: CPT | Performed by: PODIATRIST

## 2025-03-19 NOTE — PROGRESS NOTES
Name: Pal Ledbetter      : 1945      MRN: [id number]  Encounter Provider: Dior Chinchilla DPM  Encounter Date: 3/19/2025   [facility]  :  Assessment & Plan    MPRESSION:    Xerosis  RLE heel fissure without SOI     PLAN:        Right heel evaluated.  No open wound at this time.  Fissure stable and improved  Continue with keratolytic and occlusive moisturizers to help with prevention of dry skin buildup and to maintain skin barrier  Recommend continued use of Vaseline and Aquaphor to heels  Continue with supportive shoe gear  Continue with offloading pads as needed to prevent pressure and rubbing on the area.  You may discontinue mupirocin 2% ointment to the area  Patient and family member counseled on clinical signs of infection will contact the office and present to the ED for further evaluation and management if these occur  Dermatology note 2025 reviewed  PCP note 2025 reviewed  Follow-up for routine footcare    History of Present Illness   HPI  Pal Ledbetter is a 79 y.o. male who presents reevaluation of right heel dry skin and fissure.  Patient and his son states that they have been using the mupirocin ointment with significant improvement.  They deny any redness, swelling, drainage.  Patient denies any pain.  They present today for reevaluation      Review of Systems  Constitutional:  Negative for chills and fever.   Respiratory:  Negative for chest tightness.    Cardiovascular:  Positive for leg swelling.   Skin:  Positive for color change.        Objective   Pulse 74   Temp 98.7 °F (37.1 °C)   Resp 16   Ht 6' (1.829 m)   Wt 114 kg (252 lb)   SpO2 98%   BMI 34.18 kg/m²      Physical Exam  Skin:     Comments: Right posterior heel fissure healed.  No open wound.  No surrounding erythema or edema.  No pain with palpation.  No crepitus, no fluctuance, no malodor.  Xerotic skin mildly improved

## 2025-07-17 ENCOUNTER — HOSPITAL ENCOUNTER (EMERGENCY)
Facility: HOSPITAL | Age: 80
Discharge: HOME | End: 2025-07-17
Attending: STUDENT IN AN ORGANIZED HEALTH CARE EDUCATION/TRAINING PROGRAM | Admitting: STUDENT IN AN ORGANIZED HEALTH CARE EDUCATION/TRAINING PROGRAM
Payer: COMMERCIAL

## 2025-07-17 ENCOUNTER — APPOINTMENT (EMERGENCY)
Dept: CT IMAGING | Facility: HOSPITAL | Age: 80
End: 2025-07-17
Payer: COMMERCIAL

## 2025-07-17 VITALS
TEMPERATURE: 98 F | HEART RATE: 86 BPM | DIASTOLIC BLOOD PRESSURE: 74 MMHG | OXYGEN SATURATION: 94 % | RESPIRATION RATE: 22 BRPM | SYSTOLIC BLOOD PRESSURE: 163 MMHG

## 2025-07-17 DIAGNOSIS — S40.811A ABRASION OF RIGHT UPPER EXTREMITY, INITIAL ENCOUNTER: ICD-10-CM

## 2025-07-17 DIAGNOSIS — S00.83XA CONTUSION OF FACE, INITIAL ENCOUNTER: ICD-10-CM

## 2025-07-17 DIAGNOSIS — L03.116 CELLULITIS OF LEFT LOWER LEG: ICD-10-CM

## 2025-07-17 DIAGNOSIS — S01.01XA LACERATION OF SCALP, INITIAL ENCOUNTER: Primary | ICD-10-CM

## 2025-07-17 LAB
ALBUMIN SERPL BCG-MCNC: 3.7 G/DL (ref 3.5–5)
ALP SERPL-CCNC: 102 U/L (ref 34–104)
ALT SERPL W P-5'-P-CCNC: 15 U/L (ref 7–52)
ANION GAP SERPL CALCULATED.3IONS-SCNC: 10 MMOL/L (ref 4–13)
AST SERPL W P-5'-P-CCNC: 15 U/L (ref 13–39)
ATRIAL RATE: 79 BPM
BASOPHILS # BLD AUTO: 0.04 THOUSANDS/ÂΜL (ref 0–0.1)
BASOPHILS NFR BLD AUTO: 0 % (ref 0–1)
BILIRUB SERPL-MCNC: 1.36 MG/DL (ref 0.2–1)
BUN SERPL-MCNC: 34 MG/DL (ref 5–25)
CALCIUM SERPL-MCNC: 9.2 MG/DL (ref 8.4–10.2)
CARDIAC TROPONIN I PNL SERPL HS: 5 NG/L (ref ?–50)
CHLORIDE SERPL-SCNC: 99 MMOL/L (ref 96–108)
CK SERPL-CCNC: 70 U/L (ref 39–308)
CO2 SERPL-SCNC: 25 MMOL/L (ref 21–32)
CREAT SERPL-MCNC: 1.42 MG/DL (ref 0.6–1.3)
EOSINOPHIL # BLD AUTO: 0.15 THOUSAND/ÂΜL (ref 0–0.61)
EOSINOPHIL NFR BLD AUTO: 1 % (ref 0–6)
ERYTHROCYTE [DISTWIDTH] IN BLOOD BY AUTOMATED COUNT: 14.6 % (ref 11.6–15.1)
GFR SERPL CREATININE-BSD FRML MDRD: 46 ML/MIN/1.73SQ M
GLUCOSE SERPL-MCNC: 127 MG/DL (ref 65–140)
HCT VFR BLD AUTO: 43.6 % (ref 36.5–49.3)
HGB BLD-MCNC: 14.5 G/DL (ref 12–17)
IMM GRANULOCYTES # BLD AUTO: 0.07 THOUSAND/UL (ref 0–0.2)
IMM GRANULOCYTES NFR BLD AUTO: 1 % (ref 0–2)
INR PPP: 1 (ref 0.85–1.19)
LYMPHOCYTES # BLD AUTO: 1.29 THOUSANDS/ÂΜL (ref 0.6–4.47)
LYMPHOCYTES NFR BLD AUTO: 11 % (ref 14–44)
MCH RBC QN AUTO: 28.7 PG (ref 26.8–34.3)
MCHC RBC AUTO-ENTMCNC: 33.3 G/DL (ref 31.4–37.4)
MCV RBC AUTO: 86 FL (ref 82–98)
MONOCYTES # BLD AUTO: 0.74 THOUSAND/ÂΜL (ref 0.17–1.22)
MONOCYTES NFR BLD AUTO: 7 % (ref 4–12)
NEUTROPHILS # BLD AUTO: 8.98 THOUSANDS/ÂΜL (ref 1.85–7.62)
NEUTS SEG NFR BLD AUTO: 80 % (ref 43–75)
NRBC BLD AUTO-RTO: 0 /100 WBCS
PLATELET # BLD AUTO: 233 THOUSANDS/UL (ref 149–390)
PMV BLD AUTO: 9.2 FL (ref 8.9–12.7)
POTASSIUM SERPL-SCNC: 4.2 MMOL/L (ref 3.5–5.3)
PR INTERVAL: 214 MS
PROT SERPL-MCNC: 7.4 G/DL (ref 6.4–8.4)
PROTHROMBIN TIME: 13.7 SECONDS (ref 12.3–15)
QRS AXIS: 182 DEGREES
QRSD INTERVAL: 128 MS
QT INTERVAL: 410 MS
QTC INTERVAL: 470 MS
RBC # BLD AUTO: 5.06 MILLION/UL (ref 3.88–5.62)
SODIUM SERPL-SCNC: 134 MMOL/L (ref 135–147)
T WAVE AXIS: 24 DEGREES
VENTRICULAR RATE: 79 BPM
WBC # BLD AUTO: 11.27 THOUSAND/UL (ref 4.31–10.16)

## 2025-07-17 PROCEDURE — 99284 EMERGENCY DEPT VISIT MOD MDM: CPT

## 2025-07-17 PROCEDURE — 93005 ELECTROCARDIOGRAM TRACING: CPT

## 2025-07-17 PROCEDURE — 85610 PROTHROMBIN TIME: CPT | Performed by: STUDENT IN AN ORGANIZED HEALTH CARE EDUCATION/TRAINING PROGRAM

## 2025-07-17 PROCEDURE — 72125 CT NECK SPINE W/O DYE: CPT

## 2025-07-17 PROCEDURE — 84484 ASSAY OF TROPONIN QUANT: CPT | Performed by: STUDENT IN AN ORGANIZED HEALTH CARE EDUCATION/TRAINING PROGRAM

## 2025-07-17 PROCEDURE — 85025 COMPLETE CBC W/AUTO DIFF WBC: CPT | Performed by: STUDENT IN AN ORGANIZED HEALTH CARE EDUCATION/TRAINING PROGRAM

## 2025-07-17 PROCEDURE — 12002 RPR S/N/AX/GEN/TRNK2.6-7.5CM: CPT | Performed by: STUDENT IN AN ORGANIZED HEALTH CARE EDUCATION/TRAINING PROGRAM

## 2025-07-17 PROCEDURE — 80053 COMPREHEN METABOLIC PANEL: CPT | Performed by: STUDENT IN AN ORGANIZED HEALTH CARE EDUCATION/TRAINING PROGRAM

## 2025-07-17 PROCEDURE — 93010 ELECTROCARDIOGRAM REPORT: CPT | Performed by: INTERNAL MEDICINE

## 2025-07-17 PROCEDURE — 99284 EMERGENCY DEPT VISIT MOD MDM: CPT | Performed by: STUDENT IN AN ORGANIZED HEALTH CARE EDUCATION/TRAINING PROGRAM

## 2025-07-17 PROCEDURE — 82550 ASSAY OF CK (CPK): CPT | Performed by: STUDENT IN AN ORGANIZED HEALTH CARE EDUCATION/TRAINING PROGRAM

## 2025-07-17 PROCEDURE — 71260 CT THORAX DX C+: CPT

## 2025-07-17 PROCEDURE — 70450 CT HEAD/BRAIN W/O DYE: CPT

## 2025-07-17 PROCEDURE — 36415 COLL VENOUS BLD VENIPUNCTURE: CPT | Performed by: STUDENT IN AN ORGANIZED HEALTH CARE EDUCATION/TRAINING PROGRAM

## 2025-07-17 PROCEDURE — 74177 CT ABD & PELVIS W/CONTRAST: CPT

## 2025-07-17 RX ORDER — LIDOCAINE HYDROCHLORIDE AND EPINEPHRINE 10; 10 MG/ML; UG/ML
10 INJECTION, SOLUTION INFILTRATION; PERINEURAL ONCE
Status: COMPLETED | OUTPATIENT
Start: 2025-07-17 | End: 2025-07-17

## 2025-07-17 RX ORDER — CEPHALEXIN 500 MG/1
500 CAPSULE ORAL EVERY 6 HOURS SCHEDULED
Qty: 20 CAPSULE | Refills: 0 | Status: SHIPPED | OUTPATIENT
Start: 2025-07-17 | End: 2025-07-22

## 2025-07-17 RX ADMIN — IOHEXOL 100 ML: 350 INJECTION, SOLUTION INTRAVENOUS at 11:37

## 2025-07-17 RX ADMIN — LIDOCAINE HYDROCHLORIDE,EPINEPHRINE BITARTRATE 10 ML: 10; .01 INJECTION, SOLUTION INFILTRATION; PERINEURAL at 13:24

## 2025-07-17 NOTE — ED PROVIDER NOTES
Emergency Department Trauma Note  Pal Ledbetter 79 y.o. male MRN: [id number]  Unit/Bed#: ED 09/ED 09 Encounter: [id number]      Trauma Alert: Trauma Acuity: Trauma Evaluation  Model of Arrival:   via    Trauma Team: Current Providers  Attending Provider: Rupesh Peña DO  Registered Nurse: Grisel Samaniego RN  Consultants:     None      History of Present Illness     Chief Complaint:   Chief Complaint   Patient presents with    Fall     Pt fell from standing position, striking head on corner of entertainment center around 0930. Takes blood thinners. No LOC. Bleeding controlled.      HPI:  Pal Ledbetter is a 79 y.o. male who presents with injury status post fall.  Patient's family states that the patient was attempting to pick something up from the floor and lost his balance.  He struck the back of his head on an entertainment center.  No loss of consciousness.  On Eliquis.  Bleeding controlled upon arrival.  Trauma evaluation call.    Mechanism:             History provided by:  Patient  Head Injury w/unknown LOC  Location:  Occipital  Mechanism of injury: fall    Associated symptoms: headache and neck pain    Associated symptoms: no blurred vision, no difficulty breathing, no disorientation, no double vision, no focal weakness, no nausea, no seizures and no vomiting      Review of Systems   Unable to perform ROS: Acuity of condition   Eyes:  Negative for blurred vision and double vision.   Respiratory:  Negative for shortness of breath.    Cardiovascular:  Negative for chest pain and palpitations.   Gastrointestinal:  Negative for abdominal pain, nausea and vomiting.   Musculoskeletal:  Positive for neck pain and neck stiffness. Negative for back pain.   Skin:  Positive for color change and wound.   Neurological:  Positive for headaches. Negative for focal weakness and seizures.   Psychiatric/Behavioral:  Positive for confusion and decreased concentration.        Historical Information     Immunizations:    Immunization History   Administered Date(s) Administered    COVID-19 MODERNA VACC 0.5 ML IM 03/18/2021, 04/15/2021    COVID-19 PFIZER VACCINE 0.3 ML IM 12/22/2021    H1N1 Inj 04/09/2010    INFLUENZA 10/01/2015    Influenza Quadrivalent 3 years and older 10/01/2015, 10/08/2020    Influenza Quadrivalent Preservative Free 3 years and older IM 12/06/2001, 12/06/2002, 12/05/2003, 10/19/2005, 11/02/2007, 10/17/2008, 11/10/2009, 10/28/2010, 10/11/2011, 09/11/2012, 09/16/2013, 11/07/2014, 11/03/2015, 10/24/2016, 10/20/2017, 10/10/2018    Influenza Split High Dose Preservative Free IM 12/14/2024    Influenza, high dose seasonal 0.7 mL 09/22/2021, 09/06/2022    Pneumococcal Conjugate 13-Valent 05/25/2016    Pneumococcal Polysaccharide PPV23 10/19/2005, 12/17/2010    Td (adult), adsorbed 09/08/2001    Tdap 01/18/2013, 12/07/2023    influenza, trivalent, adjuvanted 10/01/2019       Past Medical History[1]  Family History[2]  Past Surgical History[3]  Social History[4]  E-Cigarette/Vaping    E-Cigarette Use Never User      E-Cigarette/Vaping Substances    Nicotine No     THC No     CBD No     Flavoring No     Other No     Unknown No        Family History: non-contributory    Meds/Allergies   Prior to Admission Medications   Prescriptions Last Dose Informant Patient Reported? Taking?   Admelog SoloStar 100 units/mL injection pen  Family Member Yes No   Sig: PLEASE SEE ATTACHED FOR DETAILED DIRECTIONS   Insulin Pen Needle (B-D UF III MINI PEN NEEDLES) 31G X 5 MM MISC  Family Member Yes No   Sig: FOR USE WITH INSULIN PEN SYSTEM UP TO 7 TIMES A DAY AS DIRECTED Strength: 31G X 5 MM   OneTouch Delica Lancets 33G MISC  Family Member Yes No   aspirin (ECOTRIN LOW STRENGTH) 81 mg EC tablet  Family Member No No   Sig: Take 1 tablet (81 mg total) by mouth daily Do not start before September 6, 2023.   atorvastatin (LIPITOR) 40 mg tablet  Family Member No No   Sig: Take 1 tablet (40 mg total) by mouth daily with dinner   furosemide  (LASIX) 20 mg tablet  Family Member No No   Sig: Take 1 tablet (20 mg total) by mouth daily Do not start before 2024.   glucose blood (FreeStyle Precision Dudley Test) test strip  Family Member Yes No   Sig: May use 1 strip daily if needed for Freestyle Camilo 2 CGM system to monitor blood glucose   insulin degludec (Tresiba FlexTouch) 200 units/mL CONCENTRATED U-200 injection pen  Family Member Yes No   Sig: INJECT 70 U IN THE MORNING   insulin lispro (HumALOG/ADMELOG) 100 units/mL injection  Family Member Yes No   Sig: Inject 1 Units under the skin 3 (three) times a day with meals TAKES ADMELOG SOLOSTAR WITH MEALS PER SLIDING SCALE AT HOME   levothyroxine 112 mcg tablet  Family Member Yes No   Sig: Take 125 mcg by mouth in the morning   losartan (COZAAR) 50 mg tablet  Family Member Yes No   Sig: Take 1 tablet by mouth daily   mupirocin (BACTROBAN) 2 % ointment   No No   Sig: Apply topically daily   omeprazole (PriLOSEC) 40 MG capsule  Family Member Yes No   Si twice daily for 1 month then 1 daily      Facility-Administered Medications: None       Allergies[5]    PHYSICAL EXAM    Objective   Vitals:   First set: Temperature: 98 °F (36.7 °C) (25 1113)  Pulse: 74 (25 1113)  Respirations: 18 (25 1113)  Blood Pressure: (!) 182/94 (25 1113)  SpO2: 98 % (25 1113)    Primary Survey:   (A) Airway: patent  (B) Breathing: spontaneously, nonloabored  (C) Circulation: Pulses:   normal  (D) Disabliity:  GCS Total:  14  (E) Expose:  Completed    Secondary Survey: (Click on Physical Exam tab above)  Physical Exam  Vitals and nursing note reviewed.   Constitutional:       General: He is not in acute distress.     Appearance: He is ill-appearing. He is not toxic-appearing.   HENT:      Head: Normocephalic.      Comments: Gaping laceration noted along the occiput.  Bleeding controlled.  Palpable hematoma noted.     Right Ear: External ear normal.      Left Ear: External ear normal.      Eyes:      General: No scleral icterus.        Right eye: No discharge.         Left eye: No discharge.      Extraocular Movements: Extraocular movements intact.      Conjunctiva/sclera: Conjunctivae normal.       Cardiovascular:      Rate and Rhythm: Normal rate and regular rhythm.      Pulses: Normal pulses.      Heart sounds: No murmur heard.  Pulmonary:      Effort: Pulmonary effort is normal. No respiratory distress.      Breath sounds: Normal breath sounds. No wheezing or rhonchi.   Abdominal:      Palpations: Abdomen is soft.      Tenderness: There is no abdominal tenderness. There is no guarding or rebound.     Musculoskeletal:         General: Swelling, tenderness and signs of injury present.      Cervical back: Tenderness present.      Left lower leg: Edema present.      Comments: The left lower leg is edematous, erythematous.  No purulent discharge.  Excoriations noted from scratching.  Cellulitic appearing.  Compartments are soft.  No palpable areas of fluctuance.  The area is not indurated.     Skin:     General: Skin is warm and dry.      Findings: Erythema present.      Comments: Abrasion noted along the right upper arm.  Bleeding controlled.     Neurological:      Mental Status: He is disoriented.       Cervical spine cleared by clinical criteria? No (imaging required)      Cervical Collar Clearance:    The patient had a CT scan of the cervical spine demonstrating no acute injury. On exam, the patient had no midline point tenderness or paresthesias/numbness/weakness in the extremities. The patient had full range of motion (was then able to flex, extend, and rotate head laterally) without pain. There were no distracting injuries and the patient was not intoxicated.      The patient's cervical spine was cleared radiologically and clinically.     Invasive Devices       None                   Lab Results:   Results Reviewed       Procedure Component Value Units Date/Time    HS Troponin 0hr (reflex  protocol) [[id number]]  (Normal) Collected: 07/17/25 1124    Lab Status: Final result Specimen: Blood from Arm, Left Updated: 07/17/25 1155     hs TnI 0hr 5 ng/L     Comprehensive metabolic panel [[id number]]  (Abnormal) Collected: 07/17/25 1124    Lab Status: Final result Specimen: Blood from Arm, Left Updated: 07/17/25 1148     Sodium 134 mmol/L      Potassium 4.2 mmol/L      Chloride 99 mmol/L      CO2 25 mmol/L      ANION GAP 10 mmol/L      BUN 34 mg/dL      Creatinine 1.42 mg/dL      Glucose 127 mg/dL      Calcium 9.2 mg/dL      AST 15 U/L      ALT 15 U/L      Alkaline Phosphatase 102 U/L      Total Protein 7.4 g/dL      Albumin 3.7 g/dL      Total Bilirubin 1.36 mg/dL      eGFR 46 ml/min/1.73sq m     Narrative:      National Kid[employer] guidelines for Chronic Kidney Disease (CKD):     Stage 1 with normal or high GFR (GFR > 90 mL/min/1.73 square meters)    Stage 2 Mild CKD (GFR = 60-89 mL/min/1.73 square meters)    Stage 3A Moderate CKD (GFR = 45-59 mL/min/1.73 square meters)    Stage 3B Moderate CKD (GFR = 30-44 mL/min/1.73 square meters)    Stage 4 Severe CKD (GFR = 15-29 mL/min/1.73 square meters)    Stage 5 End Stage CKD (GFR <15 mL/min/1.73 square meters)  Note: GFR calculation is accurate only with a steady state creatinine    CK [[id number]]  (Normal) Collected: 07/17/25 1124    Lab Status: Final result Specimen: Blood from Arm, Left Updated: 07/17/25 1148     Total CK 70 U/L     Protime-INR [[id number]]  (Normal) Collected: 07/17/25 1124    Lab Status: Final result Specimen: Blood from Arm, Left Updated: 07/17/25 1144     Protime 13.7 seconds      INR 1.00    Narrative:      INR Therapeutic Range    Indication                                             INR Range      Atrial Fibrillation                                               2.0-3.0  Hypercoagulable State                                    2.0.2.3  Left Ventricular Asist Device                            2.0-3.0  Mechanical Heart Valve                                   -    Aortic(with afib, MI, embolism, HF, LA enlargement,    and/or coagulopathy)                                     2.0-3.0 (2.5-3.5)     Mitral                                                             2.5-3.5  Prosthetic/Bioprosthetic Heart Valve               2.0-3.0  Venous thromboembolism (VTE: VT, PE        2.0-3.0    CBC and differential [[id number]]  (Abnormal) Collected: 07/17/25 1124    Lab Status: Final result Specimen: Blood from Arm, Left Updated: 07/17/25 1130     WBC 11.27 Thousand/uL      RBC 5.06 Million/uL      Hemoglobin 14.5 g/dL      Hematocrit 43.6 %      MCV 86 fL      MCH 28.7 pg      MCHC 33.3 g/dL      RDW 14.6 %      MPV 9.2 fL      Platelets 233 Thousands/uL      nRBC 0 /100 WBCs      Segmented % 80 %      Immature Grans % 1 %      Lymphocytes % 11 %      Monocytes % 7 %      Eosinophils Relative 1 %      Basophils Relative 0 %      Absolute Neutrophils 8.98 Thousands/µL      Absolute Immature Grans 0.07 Thousand/uL      Absolute Lymphocytes 1.29 Thousands/µL      Absolute Monocytes 0.74 Thousand/µL      Eosinophils Absolute 0.15 Thousand/µL      Basophils Absolute 0.04 Thousands/µL                    Imaging Studies:   Direct to CT: Yes  TRAUMA - CT head wo contrast   Final Result by Cruz Molina MD (07/17 1203)      No acute intracranial abnormality. Right occipital scalp injury with large hematoma.            The study was marked in EPIC for immediate notification.      Workstation performed: [id number]         TRAUMA - CT spine cervical wo contrast   Final Result by Cruz Molina MD (07/17 1212)      No cervical spine fracture or traumatic malalignment.      The study was marked in EPIC for immediate notification.            Workstation performed: [id number]         CT chest abdomen pelvis w contrast   Final Result by Cruz Molina MD (07/17 1223)      No findings of acute traumatic injury in the chest, abdomen or pelvis.       Indeterminate small right renal nodules. Recommend follow-up nonemergent ultrasound.      The study was marked in EPIC for immediate notification.      Computerized Assisted Algorithm (CAA) may have aided analysis of applicable images.      Workstation performed: [id number]               Procedures    Universal Protocol:  Consent: Verbal consent obtained  Consent given by: patient  Laceration repair    Date/Time: 7/17/2025 1:25 PM    Performed by: Rupesh Peña DO  Authorized by: Rupesh Peña DO  Body area: head/neck  Location details: scalp  Laceration length: 3.5 cm  Tendon involvement: none  Nerve involvement: none  Vascular damage: no  Anesthesia: local infiltration    Anesthesia:  Local Anesthetic: lidocaine 1% with epinephrine      Procedure Details:  Preparation: Patient was prepped and draped in the usual sterile fashion.  Irrigation solution: saline and tap water  Irrigation method: jet lavage  Amount of cleaning: extensive  Skin closure: Ethilon and 4-0 Prolene  Number of sutures: 6  Technique: simple  Approximation: close  Approximation difficulty: simple  Dressing: pressure dressing and 4x4 sterile gauze  Patient tolerance: patient tolerated the procedure well with no immediate complications  Comments: Due to the size of the scalp laceration, Prolene sutures were used to repair the scalp laceration.               ED Course           Medical Decision Making  Vital signs reviewed.  Trauma evaluation called upon arrival.  GCS 14.  No signs of respiratory distress.  Bedside imaging without signs of acute trauma.  CT imaging without acute findings.  Occipital hematoma noted.  Patient has a large, gaping laceration along the occiput.  Given the location, size of the laceration--opted to repair the laceration with sutures rather than staples.  See procedural note.  Patient also has signs of cellulitis along the left lower extremity.  No areas of fluctuance.  Low suspicion for soft tissue abscess.   Compartments are soft.  Keflex prescribed.  Patient's family instructed to follow-up with the patient's PCP in 7 days for suture removal.  Recommendation/return precautions were discussed.  Stable for discharge.    Problems Addressed:  Abrasion of right upper extremity, initial encounter: acute illness or injury  Cellulitis of left lower leg: acute illness or injury  Contusion of face, initial encounter: acute illness or injury  Laceration of scalp, initial encounter: acute illness or injury    Amount and/or Complexity of Data Reviewed  Labs: ordered.  Radiology: ordered.     Details: Chest x-ray obtained and interpreted me.  No signs of acute trauma.  CT head obtained interpreted by me.  No signs of ICH, mass effect.    Risk  Prescription drug management.                Disposition  [facility] Transfer: No  Final diagnoses:   Laceration of scalp, initial encounter   Cellulitis of left lower leg   Abrasion of right upper extremity, initial encounter   Contusion of face, initial encounter     Time reflects when diagnosis was documented in both MDM as applicable and the Disposition within this note       Time User Action Codes Description Comment    7/17/2025  1:48 PM Rupesh Peña [S01.01XA] Laceration of scalp, initial encounter     7/17/2025  1:49 PM Rupesh Peña Add [L03.116] Cellulitis of left lower leg     7/17/2025  1:49 PM Rupesh Peña [S40.811A] Abrasion of right upper extremity, initial encounter     7/17/2025  1:49 PM Rupesh Peña Add [S00.83XA] Contusion of face, initial encounter           ED Disposition       ED Disposition   Discharge    Condition   Stable    Date/Time   Thu Jul 17, 2025  1:49 PM    Comment   Pal Ledbetter discharge to home/self care.                   Follow-up Information    None       Discharge Medication List as of 7/17/2025  1:51 PM        START taking these medications    Details   cephalexin (KEFLEX) 500 mg capsule Take 1 capsule (500 mg total) by mouth  every 6 (six) hours for 5 days, Starting Thu 7/17/2025, Until Tue 7/22/2025, Normal           CONTINUE these medications which have NOT CHANGED    Details   Admelog SoloStar 100 units/mL injection pen PLEASE SEE ATTACHED FOR DETAILED DIRECTIONS, Historical Med      aspirin (ECOTRIN LOW STRENGTH) 81 mg EC tablet Take 1 tablet (81 mg total) by mouth daily Do not start before September 6, 2023., Starting Wed 9/6/2023, No Print      atorvastatin (LIPITOR) 40 mg tablet Take 1 tablet (40 mg total) by mouth daily with dinner, Starting Tue 9/5/2023, No Print      furosemide (LASIX) 20 mg tablet Take 1 tablet (20 mg total) by mouth daily Do not start before December 17, 2024., Starting Tue 12/17/2024, Normal      glucose blood (FreeStyle Precision Dudley Test) test strip May use 1 strip daily if needed for Freestyle Camilo 2 CGM system to monitor blood glucose, Historical Med      insulin degludec (Tresiba FlexTouch) 200 units/mL CONCENTRATED U-200 injection pen INJECT 70 U IN THE MORNING, Historical Med      insulin lispro (HumALOG/ADMELOG) 100 units/mL injection Inject 1 Units under the skin 3 (three) times a day with meals TAKES ADMELOG SOLOSTAR WITH MEALS PER SLIDING SCALE AT HOME, Historical Med      Insulin Pen Needle (B-D UF III MINI PEN NEEDLES) 31G X 5 MM MISC FOR USE WITH INSULIN PEN SYSTEM UP TO 7 TIMES A DAY AS DIRECTED Strength: 31G X 5 MM, Historical Med      levothyroxine 112 mcg tablet Take 125 mcg by mouth in the morning, Historical Med      losartan (COZAAR) 50 mg tablet Take 1 tablet by mouth daily, Starting Mon 12/19/2022, Historical Med      mupirocin (BACTROBAN) 2 % ointment Apply topically daily, Starting Fri 2/28/2025, Normal      omeprazole (PriLOSEC) 40 MG capsule 1 twice daily for 1 month then 1 daily, Historical Med      OneTouch Delica Lancets 33G MISC Historical Med           No discharge procedures on file.    PDMP Review       None            ED Provider  Electronically Signed by               [1]    Past Medical History:  Diagnosis Date    CHF (congestive heart failure) (HCC)     Diabetes mellitus (HCC)     Hypertension     L2 vertebral fracture (HCC)     Stricture esophagus     Stroke (cerebrum) (HCC) 08/29/2023   [2] No family history on file.  [3]   Past Surgical History:  Procedure Laterality Date    CATARACT EXTRACTION Bilateral     CHOLECYSTECTOMY      EGD     [4]   Social History  Tobacco Use    Smoking status: Former    Smokeless tobacco: Never   Vaping Use    Vaping status: Never Used   Substance Use Topics    Alcohol use: Not Currently    Drug use: Never   [5] No Known Allergies       Rupesh Peña DO  07/18/25 0908

## 2025-07-17 NOTE — ED NOTES
Wounds cleaned and dressings applied to laceration on back of the scalp, and skin tear on right arm.     Tricia Richardson  07/17/25 1354       Tricia Richardson  07/17/25 1404

## 2025-07-17 NOTE — DISCHARGE INSTRUCTIONS
A total of 6 sutures were used to repair the scalp laceration.    Follow-up with your family doctor in 7 days for removal.    You are also being prescribed a course of Keflex for treatment of possible left lower leg cellulitis.    If you notice increased redness/pain/drainage or he develop fever/chills--seek reevaluation in the emergency department.

## 2025-07-29 ENCOUNTER — APPOINTMENT (EMERGENCY)
Dept: RADIOLOGY | Facility: HOSPITAL | Age: 80
DRG: 603 | End: 2025-07-29
Payer: COMMERCIAL

## 2025-07-29 ENCOUNTER — HOSPITAL ENCOUNTER (INPATIENT)
Facility: HOSPITAL | Age: 80
LOS: 4 days | Discharge: NURSING, REHAB OR LONG-TERM CARE | DRG: 603 | End: 2025-08-02
Attending: EMERGENCY MEDICINE | Admitting: INTERNAL MEDICINE
Payer: COMMERCIAL

## 2025-07-29 ENCOUNTER — APPOINTMENT (EMERGENCY)
Dept: CT IMAGING | Facility: HOSPITAL | Age: 80
DRG: 603 | End: 2025-07-29
Payer: COMMERCIAL

## 2025-07-29 DIAGNOSIS — Z79.4 TYPE 2 DIABETES MELLITUS WITH HYPERGLYCEMIA, WITH LONG-TERM CURRENT USE OF INSULIN (HCC): ICD-10-CM

## 2025-07-29 DIAGNOSIS — L03.90 CELLULITIS: ICD-10-CM

## 2025-07-29 DIAGNOSIS — S31.000A WOUND OF SACRAL REGION, INITIAL ENCOUNTER: ICD-10-CM

## 2025-07-29 DIAGNOSIS — R53.1 WEAKNESS: Primary | ICD-10-CM

## 2025-07-29 DIAGNOSIS — L03.116 CELLULITIS OF LEFT LOWER EXTREMITY: ICD-10-CM

## 2025-07-29 DIAGNOSIS — E11.65 TYPE 2 DIABETES MELLITUS WITH HYPERGLYCEMIA, WITH LONG-TERM CURRENT USE OF INSULIN (HCC): ICD-10-CM

## 2025-07-29 LAB
2HR DELTA HS TROPONIN: 0 NG/L
ALBUMIN SERPL BCG-MCNC: 3.7 G/DL (ref 3.5–5)
ALP SERPL-CCNC: 69 U/L (ref 34–104)
ALT SERPL W P-5'-P-CCNC: 11 U/L (ref 7–52)
ANION GAP SERPL CALCULATED.3IONS-SCNC: 8 MMOL/L (ref 4–13)
AST SERPL W P-5'-P-CCNC: 16 U/L (ref 13–39)
BASOPHILS # BLD AUTO: 0.03 THOUSANDS/ÂΜL (ref 0–0.1)
BASOPHILS NFR BLD AUTO: 0 % (ref 0–1)
BILIRUB SERPL-MCNC: 0.34 MG/DL (ref 0.2–1)
BILIRUB UR QL STRIP: NEGATIVE
BNP SERPL-MCNC: 77 PG/ML (ref 0–100)
BUN SERPL-MCNC: 21 MG/DL (ref 5–25)
CALCIUM SERPL-MCNC: 7.9 MG/DL (ref 8.4–10.2)
CARDIAC TROPONIN I PNL SERPL HS: 14 NG/L (ref ?–50)
CARDIAC TROPONIN I PNL SERPL HS: 14 NG/L (ref ?–50)
CHLORIDE SERPL-SCNC: 104 MMOL/L (ref 96–108)
CLARITY UR: CLEAR
CO2 SERPL-SCNC: 26 MMOL/L (ref 21–32)
COLOR UR: YELLOW
CREAT SERPL-MCNC: 1.09 MG/DL (ref 0.6–1.3)
EOSINOPHIL # BLD AUTO: 0.14 THOUSAND/ÂΜL (ref 0–0.61)
EOSINOPHIL NFR BLD AUTO: 1 % (ref 0–6)
ERYTHROCYTE [DISTWIDTH] IN BLOOD BY AUTOMATED COUNT: 14.2 % (ref 11.6–15.1)
FLUAV AG UPPER RESP QL IA.RAPID: NEGATIVE
FLUBV AG UPPER RESP QL IA.RAPID: NEGATIVE
GFR SERPL CREATININE-BSD FRML MDRD: 64 ML/MIN/1.73SQ M
GLUCOSE SERPL-MCNC: 112 MG/DL (ref 65–140)
GLUCOSE SERPL-MCNC: 115 MG/DL (ref 65–140)
GLUCOSE UR STRIP-MCNC: NEGATIVE MG/DL
HCT VFR BLD AUTO: 34.6 % (ref 36.5–49.3)
HGB BLD-MCNC: 11.6 G/DL (ref 12–17)
HGB UR QL STRIP.AUTO: NEGATIVE
IMM GRANULOCYTES # BLD AUTO: 0.09 THOUSAND/UL (ref 0–0.2)
IMM GRANULOCYTES NFR BLD AUTO: 1 % (ref 0–2)
KETONES UR STRIP-MCNC: NEGATIVE MG/DL
LACTATE SERPL-SCNC: 1.7 MMOL/L (ref 0.5–2)
LACTATE SERPL-SCNC: 2.2 MMOL/L (ref 0.5–2)
LEUKOCYTE ESTERASE UR QL STRIP: NEGATIVE
LYMPHOCYTES # BLD AUTO: 0.85 THOUSANDS/ÂΜL (ref 0.6–4.47)
LYMPHOCYTES NFR BLD AUTO: 6 % (ref 14–44)
MCH RBC QN AUTO: 29.1 PG (ref 26.8–34.3)
MCHC RBC AUTO-ENTMCNC: 33.5 G/DL (ref 31.4–37.4)
MCV RBC AUTO: 87 FL (ref 82–98)
MONOCYTES # BLD AUTO: 0.9 THOUSAND/ÂΜL (ref 0.17–1.22)
MONOCYTES NFR BLD AUTO: 7 % (ref 4–12)
NEUTROPHILS # BLD AUTO: 11.81 THOUSANDS/ÂΜL (ref 1.85–7.62)
NEUTS SEG NFR BLD AUTO: 85 % (ref 43–75)
NITRITE UR QL STRIP: NEGATIVE
NRBC BLD AUTO-RTO: 0 /100 WBCS
PH UR STRIP.AUTO: 5.5 [PH]
PLATELET # BLD AUTO: 260 THOUSANDS/UL (ref 149–390)
PMV BLD AUTO: 10.1 FL (ref 8.9–12.7)
POTASSIUM SERPL-SCNC: 3.9 MMOL/L (ref 3.5–5.3)
PROCALCITONIN SERPL-MCNC: 0.77 NG/ML
PROT SERPL-MCNC: 6.2 G/DL (ref 6.4–8.4)
PROT UR STRIP-MCNC: NEGATIVE MG/DL
RBC # BLD AUTO: 3.98 MILLION/UL (ref 3.88–5.62)
SARS-COV+SARS-COV-2 AG RESP QL IA.RAPID: NEGATIVE
SODIUM SERPL-SCNC: 138 MMOL/L (ref 135–147)
SP GR UR STRIP.AUTO: 1.01 (ref 1–1.03)
UROBILINOGEN UR QL STRIP.AUTO: 2 E.U./DL
WBC # BLD AUTO: 13.82 THOUSAND/UL (ref 4.31–10.16)

## 2025-07-29 PROCEDURE — 80053 COMPREHEN METABOLIC PANEL: CPT

## 2025-07-29 PROCEDURE — 96360 HYDRATION IV INFUSION INIT: CPT

## 2025-07-29 PROCEDURE — 87811 SARS-COV-2 COVID19 W/OPTIC: CPT

## 2025-07-29 PROCEDURE — 83880 ASSAY OF NATRIURETIC PEPTIDE: CPT

## 2025-07-29 PROCEDURE — 71045 X-RAY EXAM CHEST 1 VIEW: CPT

## 2025-07-29 PROCEDURE — 81003 URINALYSIS AUTO W/O SCOPE: CPT | Performed by: EMERGENCY MEDICINE

## 2025-07-29 PROCEDURE — 84484 ASSAY OF TROPONIN QUANT: CPT

## 2025-07-29 PROCEDURE — 99285 EMERGENCY DEPT VISIT HI MDM: CPT | Performed by: EMERGENCY MEDICINE

## 2025-07-29 PROCEDURE — 99223 1ST HOSP IP/OBS HIGH 75: CPT | Performed by: NURSE PRACTITIONER

## 2025-07-29 PROCEDURE — 93005 ELECTROCARDIOGRAM TRACING: CPT

## 2025-07-29 PROCEDURE — 83605 ASSAY OF LACTIC ACID: CPT

## 2025-07-29 PROCEDURE — 87040 BLOOD CULTURE FOR BACTERIA: CPT | Performed by: EMERGENCY MEDICINE

## 2025-07-29 PROCEDURE — 99285 EMERGENCY DEPT VISIT HI MDM: CPT

## 2025-07-29 PROCEDURE — 87804 INFLUENZA ASSAY W/OPTIC: CPT

## 2025-07-29 PROCEDURE — 82948 REAGENT STRIP/BLOOD GLUCOSE: CPT

## 2025-07-29 PROCEDURE — 85025 COMPLETE CBC W/AUTO DIFF WBC: CPT

## 2025-07-29 PROCEDURE — 36415 COLL VENOUS BLD VENIPUNCTURE: CPT

## 2025-07-29 PROCEDURE — 84145 PROCALCITONIN (PCT): CPT | Performed by: EMERGENCY MEDICINE

## 2025-07-29 PROCEDURE — 71250 CT THORAX DX C-: CPT

## 2025-07-29 RX ORDER — LOSARTAN POTASSIUM 50 MG/1
50 TABLET ORAL DAILY
Status: DISCONTINUED | OUTPATIENT
Start: 2025-07-30 | End: 2025-08-02 | Stop reason: HOSPADM

## 2025-07-29 RX ORDER — ENOXAPARIN SODIUM 100 MG/ML
40 INJECTION SUBCUTANEOUS DAILY
Status: DISCONTINUED | OUTPATIENT
Start: 2025-07-30 | End: 2025-08-02 | Stop reason: HOSPADM

## 2025-07-29 RX ORDER — FUROSEMIDE 20 MG/1
20 TABLET ORAL DAILY
Status: DISCONTINUED | OUTPATIENT
Start: 2025-07-30 | End: 2025-08-02 | Stop reason: HOSPADM

## 2025-07-29 RX ORDER — ATORVASTATIN CALCIUM 40 MG/1
40 TABLET, FILM COATED ORAL
Status: DISCONTINUED | OUTPATIENT
Start: 2025-07-30 | End: 2025-08-02 | Stop reason: HOSPADM

## 2025-07-29 RX ORDER — INSULIN GLARGINE 100 [IU]/ML
20 INJECTION, SOLUTION SUBCUTANEOUS EVERY MORNING
Status: DISCONTINUED | OUTPATIENT
Start: 2025-07-30 | End: 2025-08-02 | Stop reason: HOSPADM

## 2025-07-29 RX ORDER — METRONIDAZOLE 500 MG/100ML
500 INJECTION, SOLUTION INTRAVENOUS EVERY 12 HOURS
Status: DISCONTINUED | OUTPATIENT
Start: 2025-07-29 | End: 2025-07-31

## 2025-07-29 RX ORDER — INSULIN LISPRO 100 [IU]/ML
1-6 INJECTION, SOLUTION INTRAVENOUS; SUBCUTANEOUS
Status: DISCONTINUED | OUTPATIENT
Start: 2025-07-29 | End: 2025-08-02 | Stop reason: HOSPADM

## 2025-07-29 RX ORDER — CEFAZOLIN SODIUM 2 G/50ML
2000 SOLUTION INTRAVENOUS EVERY 8 HOURS
Status: DISCONTINUED | OUTPATIENT
Start: 2025-07-29 | End: 2025-08-02 | Stop reason: HOSPADM

## 2025-07-29 RX ORDER — PANTOPRAZOLE SODIUM 40 MG/1
40 TABLET, DELAYED RELEASE ORAL
Status: DISCONTINUED | OUTPATIENT
Start: 2025-07-30 | End: 2025-08-02 | Stop reason: HOSPADM

## 2025-07-29 RX ORDER — CEPHALEXIN 500 MG/1
500 CAPSULE ORAL 4 TIMES DAILY
Status: ON HOLD | COMMUNITY
Start: 2025-07-25 | End: 2025-08-02

## 2025-07-29 RX ORDER — INSULIN LISPRO 100 [IU]/ML
1-6 INJECTION, SOLUTION INTRAVENOUS; SUBCUTANEOUS
Status: DISCONTINUED | OUTPATIENT
Start: 2025-07-30 | End: 2025-08-02 | Stop reason: HOSPADM

## 2025-07-29 RX ORDER — LEVOTHYROXINE SODIUM 125 UG/1
125 TABLET ORAL
Status: DISCONTINUED | OUTPATIENT
Start: 2025-07-30 | End: 2025-08-02 | Stop reason: HOSPADM

## 2025-07-29 RX ORDER — ASPIRIN 81 MG/1
81 TABLET ORAL DAILY
Status: DISCONTINUED | OUTPATIENT
Start: 2025-07-30 | End: 2025-08-02 | Stop reason: HOSPADM

## 2025-07-29 RX ADMIN — VANCOMYCIN HYDROCHLORIDE 1750 MG: 1 INJECTION, POWDER, LYOPHILIZED, FOR SOLUTION INTRAVENOUS at 21:30

## 2025-07-29 RX ADMIN — SODIUM CHLORIDE 500 ML: 0.9 INJECTION, SOLUTION INTRAVENOUS at 19:32

## 2025-07-30 ENCOUNTER — APPOINTMENT (INPATIENT)
Dept: NON INVASIVE DIAGNOSTICS | Facility: HOSPITAL | Age: 80
DRG: 603 | End: 2025-07-30
Payer: COMMERCIAL

## 2025-07-30 PROBLEM — R29.6 FREQUENT FALLS: Status: ACTIVE | Noted: 2025-07-30

## 2025-07-30 PROBLEM — S31.000A SACRAL WOUND: Status: ACTIVE | Noted: 2025-07-30

## 2025-07-30 PROBLEM — Z86.73 HISTORY OF CVA (CEREBROVASCULAR ACCIDENT): Status: ACTIVE | Noted: 2023-08-29

## 2025-07-30 LAB
25(OH)D3 SERPL-MCNC: 29.6 NG/ML (ref 30–100)
ANION GAP SERPL CALCULATED.3IONS-SCNC: 6 MMOL/L (ref 4–13)
ATRIAL RATE: 79 BPM
BUN SERPL-MCNC: 25 MG/DL (ref 5–25)
CALCIUM SERPL-MCNC: 7.6 MG/DL (ref 8.4–10.2)
CHLORIDE SERPL-SCNC: 102 MMOL/L (ref 96–108)
CO2 SERPL-SCNC: 25 MMOL/L (ref 21–32)
CREAT SERPL-MCNC: 0.99 MG/DL (ref 0.6–1.3)
ERYTHROCYTE [DISTWIDTH] IN BLOOD BY AUTOMATED COUNT: 14.2 % (ref 11.6–15.1)
EST. AVERAGE GLUCOSE BLD GHB EST-MCNC: 140 MG/DL
GFR SERPL CREATININE-BSD FRML MDRD: 72 ML/MIN/1.73SQ M
GLUCOSE SERPL-MCNC: 174 MG/DL (ref 65–140)
GLUCOSE SERPL-MCNC: 184 MG/DL (ref 65–140)
GLUCOSE SERPL-MCNC: 221 MG/DL (ref 65–140)
GLUCOSE SERPL-MCNC: 79 MG/DL (ref 65–140)
GLUCOSE SERPL-MCNC: 84 MG/DL (ref 65–140)
HBA1C MFR BLD: 6.5 %
HCT VFR BLD AUTO: 31.1 % (ref 36.5–49.3)
HGB BLD-MCNC: 10.5 G/DL (ref 12–17)
MAGNESIUM SERPL-MCNC: 1.8 MG/DL (ref 1.9–2.7)
MCH RBC QN AUTO: 28.9 PG (ref 26.8–34.3)
MCHC RBC AUTO-ENTMCNC: 33.8 G/DL (ref 31.4–37.4)
MCV RBC AUTO: 86 FL (ref 82–98)
P AXIS: 39 DEGREES
PLATELET # BLD AUTO: 237 THOUSANDS/UL (ref 149–390)
PMV BLD AUTO: 9.4 FL (ref 8.9–12.7)
POTASSIUM SERPL-SCNC: 3.1 MMOL/L (ref 3.5–5.3)
PR INTERVAL: 204 MS
PROCALCITONIN SERPL-MCNC: 0.7 NG/ML
QRS AXIS: 57 DEGREES
QRSD INTERVAL: 138 MS
QT INTERVAL: 412 MS
QTC INTERVAL: 472 MS
RBC # BLD AUTO: 3.63 MILLION/UL (ref 3.88–5.62)
SODIUM SERPL-SCNC: 133 MMOL/L (ref 135–147)
T WAVE AXIS: 6 DEGREES
TSH SERPL DL<=0.05 MIU/L-ACNC: 2.89 UIU/ML (ref 0.45–4.5)
VENTRICULAR RATE: 79 BPM
VIT B12 SERPL-MCNC: 240 PG/ML (ref 180–914)
WBC # BLD AUTO: 11.32 THOUSAND/UL (ref 4.31–10.16)

## 2025-07-30 PROCEDURE — 85027 COMPLETE CBC AUTOMATED: CPT | Performed by: NURSE PRACTITIONER

## 2025-07-30 PROCEDURE — 97530 THERAPEUTIC ACTIVITIES: CPT

## 2025-07-30 PROCEDURE — 82948 REAGENT STRIP/BLOOD GLUCOSE: CPT

## 2025-07-30 PROCEDURE — 83735 ASSAY OF MAGNESIUM: CPT | Performed by: NURSE PRACTITIONER

## 2025-07-30 PROCEDURE — 83036 HEMOGLOBIN GLYCOSYLATED A1C: CPT | Performed by: NURSE PRACTITIONER

## 2025-07-30 PROCEDURE — 87081 CULTURE SCREEN ONLY: CPT | Performed by: NURSE PRACTITIONER

## 2025-07-30 PROCEDURE — 80048 BASIC METABOLIC PNL TOTAL CA: CPT | Performed by: NURSE PRACTITIONER

## 2025-07-30 PROCEDURE — 93010 ELECTROCARDIOGRAM REPORT: CPT | Performed by: INTERNAL MEDICINE

## 2025-07-30 PROCEDURE — 82607 VITAMIN B-12: CPT | Performed by: NURSE PRACTITIONER

## 2025-07-30 PROCEDURE — 93970 EXTREMITY STUDY: CPT | Performed by: SURGERY

## 2025-07-30 PROCEDURE — 97167 OT EVAL HIGH COMPLEX 60 MIN: CPT

## 2025-07-30 PROCEDURE — 84145 PROCALCITONIN (PCT): CPT | Performed by: NURSE PRACTITIONER

## 2025-07-30 PROCEDURE — 99232 SBSQ HOSP IP/OBS MODERATE 35: CPT | Performed by: INTERNAL MEDICINE

## 2025-07-30 PROCEDURE — 93970 EXTREMITY STUDY: CPT

## 2025-07-30 PROCEDURE — 82306 VITAMIN D 25 HYDROXY: CPT | Performed by: NURSE PRACTITIONER

## 2025-07-30 PROCEDURE — 97535 SELF CARE MNGMENT TRAINING: CPT

## 2025-07-30 PROCEDURE — 97163 PT EVAL HIGH COMPLEX 45 MIN: CPT

## 2025-07-30 PROCEDURE — 84443 ASSAY THYROID STIM HORMONE: CPT | Performed by: NURSE PRACTITIONER

## 2025-07-30 PROCEDURE — 87147 CULTURE TYPE IMMUNOLOGIC: CPT | Performed by: NURSE PRACTITIONER

## 2025-07-30 RX ORDER — LANOLIN ALCOHOL/MO/W.PET/CERES
800 CREAM (GRAM) TOPICAL ONCE
Status: COMPLETED | OUTPATIENT
Start: 2025-07-30 | End: 2025-07-30

## 2025-07-30 RX ORDER — POTASSIUM CHLORIDE 1500 MG/1
40 TABLET, EXTENDED RELEASE ORAL ONCE
Status: COMPLETED | OUTPATIENT
Start: 2025-07-30 | End: 2025-07-30

## 2025-07-30 RX ADMIN — CEFAZOLIN SODIUM 2000 MG: 2 SOLUTION INTRAVENOUS at 06:08

## 2025-07-30 RX ADMIN — ASPIRIN 81 MG: 81 TABLET, DELAYED RELEASE ORAL at 08:35

## 2025-07-30 RX ADMIN — PANTOPRAZOLE SODIUM 40 MG: 40 TABLET, DELAYED RELEASE ORAL at 06:07

## 2025-07-30 RX ADMIN — INSULIN LISPRO 2 UNITS: 100 INJECTION, SOLUTION INTRAVENOUS; SUBCUTANEOUS at 21:42

## 2025-07-30 RX ADMIN — INSULIN LISPRO 1 UNITS: 100 INJECTION, SOLUTION INTRAVENOUS; SUBCUTANEOUS at 12:03

## 2025-07-30 RX ADMIN — LOSARTAN POTASSIUM 50 MG: 50 TABLET, FILM COATED ORAL at 08:35

## 2025-07-30 RX ADMIN — INSULIN LISPRO 1 UNITS: 100 INJECTION, SOLUTION INTRAVENOUS; SUBCUTANEOUS at 16:55

## 2025-07-30 RX ADMIN — CEFAZOLIN SODIUM 2000 MG: 2 SOLUTION INTRAVENOUS at 00:00

## 2025-07-30 RX ADMIN — LEVOTHYROXINE SODIUM 125 MCG: 0.12 TABLET ORAL at 06:07

## 2025-07-30 RX ADMIN — CEFAZOLIN SODIUM 2000 MG: 2 SOLUTION INTRAVENOUS at 13:52

## 2025-07-30 RX ADMIN — Medication 800 MG: at 08:34

## 2025-07-30 RX ADMIN — ENOXAPARIN SODIUM 40 MG: 40 INJECTION SUBCUTANEOUS at 08:35

## 2025-07-30 RX ADMIN — Medication 2000 UNITS: at 08:34

## 2025-07-30 RX ADMIN — FUROSEMIDE 20 MG: 20 TABLET ORAL at 08:35

## 2025-07-30 RX ADMIN — METRONIDAZOLE 500 MG: 500 INJECTION, SOLUTION INTRAVENOUS at 23:09

## 2025-07-30 RX ADMIN — INSULIN GLARGINE 20 UNITS: 100 INJECTION, SOLUTION SUBCUTANEOUS at 08:42

## 2025-07-30 RX ADMIN — METRONIDAZOLE 500 MG: 500 INJECTION, SOLUTION INTRAVENOUS at 10:20

## 2025-07-30 RX ADMIN — CEFAZOLIN SODIUM 2000 MG: 2 SOLUTION INTRAVENOUS at 21:42

## 2025-07-30 RX ADMIN — METRONIDAZOLE 500 MG: 500 INJECTION, SOLUTION INTRAVENOUS at 00:05

## 2025-07-30 RX ADMIN — POTASSIUM CHLORIDE 40 MEQ: 1500 TABLET, EXTENDED RELEASE ORAL at 08:34

## 2025-07-30 RX ADMIN — ATORVASTATIN CALCIUM 40 MG: 40 TABLET, FILM COATED ORAL at 16:55

## 2025-07-31 ENCOUNTER — APPOINTMENT (INPATIENT)
Dept: CT IMAGING | Facility: HOSPITAL | Age: 80
DRG: 603 | End: 2025-07-31
Payer: COMMERCIAL

## 2025-07-31 ENCOUNTER — APPOINTMENT (INPATIENT)
Dept: RADIOLOGY | Facility: HOSPITAL | Age: 80
DRG: 603 | End: 2025-07-31
Payer: COMMERCIAL

## 2025-07-31 LAB
ANION GAP SERPL CALCULATED.3IONS-SCNC: 5 MMOL/L (ref 4–13)
BASOPHILS # BLD AUTO: 0.03 THOUSANDS/ÂΜL (ref 0–0.1)
BASOPHILS NFR BLD AUTO: 0 % (ref 0–1)
BUN SERPL-MCNC: 27 MG/DL (ref 5–25)
CALCIUM SERPL-MCNC: 7.5 MG/DL (ref 8.4–10.2)
CHLORIDE SERPL-SCNC: 103 MMOL/L (ref 96–108)
CO2 SERPL-SCNC: 25 MMOL/L (ref 21–32)
CREAT SERPL-MCNC: 0.98 MG/DL (ref 0.6–1.3)
EOSINOPHIL # BLD AUTO: 0.2 THOUSAND/ÂΜL (ref 0–0.61)
EOSINOPHIL NFR BLD AUTO: 2 % (ref 0–6)
ERYTHROCYTE [DISTWIDTH] IN BLOOD BY AUTOMATED COUNT: 14.4 % (ref 11.6–15.1)
GFR SERPL CREATININE-BSD FRML MDRD: 73 ML/MIN/1.73SQ M
GLUCOSE SERPL-MCNC: 174 MG/DL (ref 65–140)
GLUCOSE SERPL-MCNC: 177 MG/DL (ref 65–140)
GLUCOSE SERPL-MCNC: 187 MG/DL (ref 65–140)
GLUCOSE SERPL-MCNC: 187 MG/DL (ref 65–140)
GLUCOSE SERPL-MCNC: 201 MG/DL (ref 65–140)
HCT VFR BLD AUTO: 30.7 % (ref 36.5–49.3)
HGB BLD-MCNC: 10.6 G/DL (ref 12–17)
IMM GRANULOCYTES # BLD AUTO: 0.11 THOUSAND/UL (ref 0–0.2)
IMM GRANULOCYTES NFR BLD AUTO: 1 % (ref 0–2)
LYMPHOCYTES # BLD AUTO: 1.07 THOUSANDS/ÂΜL (ref 0.6–4.47)
LYMPHOCYTES NFR BLD AUTO: 12 % (ref 14–44)
MCH RBC QN AUTO: 29.4 PG (ref 26.8–34.3)
MCHC RBC AUTO-ENTMCNC: 34.5 G/DL (ref 31.4–37.4)
MCV RBC AUTO: 85 FL (ref 82–98)
MONOCYTES # BLD AUTO: 0.64 THOUSAND/ÂΜL (ref 0.17–1.22)
MONOCYTES NFR BLD AUTO: 7 % (ref 4–12)
MRSA NOSE QL CULT: ABNORMAL
MRSA NOSE QL CULT: ABNORMAL
NEUTROPHILS # BLD AUTO: 7.04 THOUSANDS/ÂΜL (ref 1.85–7.62)
NEUTS SEG NFR BLD AUTO: 78 % (ref 43–75)
NRBC BLD AUTO-RTO: 0 /100 WBCS
PLATELET # BLD AUTO: 248 THOUSANDS/UL (ref 149–390)
PMV BLD AUTO: 9.4 FL (ref 8.9–12.7)
POTASSIUM SERPL-SCNC: 3.9 MMOL/L (ref 3.5–5.3)
RBC # BLD AUTO: 3.6 MILLION/UL (ref 3.88–5.62)
SODIUM SERPL-SCNC: 133 MMOL/L (ref 135–147)
WBC # BLD AUTO: 9.09 THOUSAND/UL (ref 4.31–10.16)

## 2025-07-31 PROCEDURE — 80048 BASIC METABOLIC PNL TOTAL CA: CPT | Performed by: INTERNAL MEDICINE

## 2025-07-31 PROCEDURE — 70450 CT HEAD/BRAIN W/O DYE: CPT

## 2025-07-31 PROCEDURE — 82948 REAGENT STRIP/BLOOD GLUCOSE: CPT

## 2025-07-31 PROCEDURE — 73650 X-RAY EXAM OF HEEL: CPT

## 2025-07-31 PROCEDURE — 99232 SBSQ HOSP IP/OBS MODERATE 35: CPT | Performed by: INTERNAL MEDICINE

## 2025-07-31 PROCEDURE — 85025 COMPLETE CBC W/AUTO DIFF WBC: CPT | Performed by: INTERNAL MEDICINE

## 2025-07-31 RX ORDER — FUROSEMIDE 10 MG/ML
20 INJECTION INTRAMUSCULAR; INTRAVENOUS ONCE
Status: COMPLETED | OUTPATIENT
Start: 2025-07-31 | End: 2025-07-31

## 2025-07-31 RX ADMIN — ATORVASTATIN CALCIUM 40 MG: 40 TABLET, FILM COATED ORAL at 17:24

## 2025-07-31 RX ADMIN — INSULIN LISPRO 2 UNITS: 100 INJECTION, SOLUTION INTRAVENOUS; SUBCUTANEOUS at 17:00

## 2025-07-31 RX ADMIN — INSULIN LISPRO 1 UNITS: 100 INJECTION, SOLUTION INTRAVENOUS; SUBCUTANEOUS at 21:34

## 2025-07-31 RX ADMIN — PANTOPRAZOLE SODIUM 40 MG: 40 TABLET, DELAYED RELEASE ORAL at 08:18

## 2025-07-31 RX ADMIN — LEVOTHYROXINE SODIUM 125 MCG: 0.12 TABLET ORAL at 05:36

## 2025-07-31 RX ADMIN — ENOXAPARIN SODIUM 40 MG: 40 INJECTION SUBCUTANEOUS at 08:18

## 2025-07-31 RX ADMIN — CEFAZOLIN SODIUM 2000 MG: 2 SOLUTION INTRAVENOUS at 15:30

## 2025-07-31 RX ADMIN — Medication 2000 UNITS: at 08:18

## 2025-07-31 RX ADMIN — CEFAZOLIN SODIUM 2000 MG: 2 SOLUTION INTRAVENOUS at 05:36

## 2025-07-31 RX ADMIN — LOSARTAN POTASSIUM 50 MG: 50 TABLET, FILM COATED ORAL at 08:18

## 2025-07-31 RX ADMIN — ASPIRIN 81 MG: 81 TABLET, DELAYED RELEASE ORAL at 08:18

## 2025-07-31 RX ADMIN — INSULIN GLARGINE 20 UNITS: 100 INJECTION, SOLUTION SUBCUTANEOUS at 08:18

## 2025-07-31 RX ADMIN — CEFAZOLIN SODIUM 2000 MG: 2 SOLUTION INTRAVENOUS at 21:33

## 2025-07-31 RX ADMIN — FUROSEMIDE 20 MG: 20 TABLET ORAL at 08:18

## 2025-07-31 RX ADMIN — INSULIN LISPRO 1 UNITS: 100 INJECTION, SOLUTION INTRAVENOUS; SUBCUTANEOUS at 12:03

## 2025-07-31 RX ADMIN — INSULIN LISPRO 1 UNITS: 100 INJECTION, SOLUTION INTRAVENOUS; SUBCUTANEOUS at 07:55

## 2025-07-31 RX ADMIN — FUROSEMIDE 20 MG: 10 INJECTION, SOLUTION INTRAMUSCULAR; INTRAVENOUS at 12:10

## 2025-08-01 LAB
ANION GAP SERPL CALCULATED.3IONS-SCNC: 5 MMOL/L (ref 4–13)
BASOPHILS # BLD AUTO: 0.03 THOUSANDS/ÂΜL (ref 0–0.1)
BASOPHILS NFR BLD AUTO: 0 % (ref 0–1)
BUN SERPL-MCNC: 23 MG/DL (ref 5–25)
CALCIUM SERPL-MCNC: 7.8 MG/DL (ref 8.4–10.2)
CHLORIDE SERPL-SCNC: 100 MMOL/L (ref 96–108)
CO2 SERPL-SCNC: 28 MMOL/L (ref 21–32)
CREAT SERPL-MCNC: 0.95 MG/DL (ref 0.6–1.3)
EOSINOPHIL # BLD AUTO: 0.21 THOUSAND/ÂΜL (ref 0–0.61)
EOSINOPHIL NFR BLD AUTO: 2 % (ref 0–6)
ERYTHROCYTE [DISTWIDTH] IN BLOOD BY AUTOMATED COUNT: 14.5 % (ref 11.6–15.1)
GFR SERPL CREATININE-BSD FRML MDRD: 75 ML/MIN/1.73SQ M
GLUCOSE SERPL-MCNC: 154 MG/DL (ref 65–140)
GLUCOSE SERPL-MCNC: 154 MG/DL (ref 65–140)
GLUCOSE SERPL-MCNC: 233 MG/DL (ref 65–140)
GLUCOSE SERPL-MCNC: 236 MG/DL (ref 65–140)
GLUCOSE SERPL-MCNC: 248 MG/DL (ref 65–140)
HCT VFR BLD AUTO: 32 % (ref 36.5–49.3)
HGB BLD-MCNC: 10.8 G/DL (ref 12–17)
IMM GRANULOCYTES # BLD AUTO: 0.09 THOUSAND/UL (ref 0–0.2)
IMM GRANULOCYTES NFR BLD AUTO: 1 % (ref 0–2)
LYMPHOCYTES # BLD AUTO: 1.01 THOUSANDS/ÂΜL (ref 0.6–4.47)
LYMPHOCYTES NFR BLD AUTO: 12 % (ref 14–44)
MCH RBC QN AUTO: 29.3 PG (ref 26.8–34.3)
MCHC RBC AUTO-ENTMCNC: 33.8 G/DL (ref 31.4–37.4)
MCV RBC AUTO: 87 FL (ref 82–98)
MONOCYTES # BLD AUTO: 0.66 THOUSAND/ÂΜL (ref 0.17–1.22)
MONOCYTES NFR BLD AUTO: 8 % (ref 4–12)
NEUTROPHILS # BLD AUTO: 6.79 THOUSANDS/ÂΜL (ref 1.85–7.62)
NEUTS SEG NFR BLD AUTO: 77 % (ref 43–75)
NRBC BLD AUTO-RTO: 0 /100 WBCS
PLATELET # BLD AUTO: 259 THOUSANDS/UL (ref 149–390)
PMV BLD AUTO: 9.4 FL (ref 8.9–12.7)
POTASSIUM SERPL-SCNC: 3.8 MMOL/L (ref 3.5–5.3)
RBC # BLD AUTO: 3.69 MILLION/UL (ref 3.88–5.62)
SODIUM SERPL-SCNC: 133 MMOL/L (ref 135–147)
WBC # BLD AUTO: 8.79 THOUSAND/UL (ref 4.31–10.16)

## 2025-08-01 PROCEDURE — 85025 COMPLETE CBC W/AUTO DIFF WBC: CPT | Performed by: INTERNAL MEDICINE

## 2025-08-01 PROCEDURE — 99232 SBSQ HOSP IP/OBS MODERATE 35: CPT | Performed by: INTERNAL MEDICINE

## 2025-08-01 PROCEDURE — 97530 THERAPEUTIC ACTIVITIES: CPT

## 2025-08-01 PROCEDURE — 97110 THERAPEUTIC EXERCISES: CPT

## 2025-08-01 PROCEDURE — 82948 REAGENT STRIP/BLOOD GLUCOSE: CPT

## 2025-08-01 PROCEDURE — 99222 1ST HOSP IP/OBS MODERATE 55: CPT | Performed by: STUDENT IN AN ORGANIZED HEALTH CARE EDUCATION/TRAINING PROGRAM

## 2025-08-01 PROCEDURE — 80048 BASIC METABOLIC PNL TOTAL CA: CPT | Performed by: INTERNAL MEDICINE

## 2025-08-01 RX ADMIN — CEFAZOLIN SODIUM 2000 MG: 2 SOLUTION INTRAVENOUS at 14:51

## 2025-08-01 RX ADMIN — INSULIN LISPRO 3 UNITS: 100 INJECTION, SOLUTION INTRAVENOUS; SUBCUTANEOUS at 17:34

## 2025-08-01 RX ADMIN — Medication 2000 UNITS: at 08:25

## 2025-08-01 RX ADMIN — INSULIN LISPRO 3 UNITS: 100 INJECTION, SOLUTION INTRAVENOUS; SUBCUTANEOUS at 21:51

## 2025-08-01 RX ADMIN — FUROSEMIDE 20 MG: 20 TABLET ORAL at 08:25

## 2025-08-01 RX ADMIN — PANTOPRAZOLE SODIUM 40 MG: 40 TABLET, DELAYED RELEASE ORAL at 06:10

## 2025-08-01 RX ADMIN — LOSARTAN POTASSIUM 50 MG: 50 TABLET, FILM COATED ORAL at 08:25

## 2025-08-01 RX ADMIN — LEVOTHYROXINE SODIUM 125 MCG: 0.12 TABLET ORAL at 06:10

## 2025-08-01 RX ADMIN — INSULIN LISPRO 3 UNITS: 100 INJECTION, SOLUTION INTRAVENOUS; SUBCUTANEOUS at 11:47

## 2025-08-01 RX ADMIN — CEFAZOLIN SODIUM 2000 MG: 2 SOLUTION INTRAVENOUS at 06:10

## 2025-08-01 RX ADMIN — CEFAZOLIN SODIUM 2000 MG: 2 SOLUTION INTRAVENOUS at 21:50

## 2025-08-01 RX ADMIN — ASPIRIN 81 MG: 81 TABLET, DELAYED RELEASE ORAL at 08:25

## 2025-08-01 RX ADMIN — INSULIN LISPRO 1 UNITS: 100 INJECTION, SOLUTION INTRAVENOUS; SUBCUTANEOUS at 08:25

## 2025-08-01 RX ADMIN — ATORVASTATIN CALCIUM 40 MG: 40 TABLET, FILM COATED ORAL at 17:34

## 2025-08-01 RX ADMIN — INSULIN GLARGINE 20 UNITS: 100 INJECTION, SOLUTION SUBCUTANEOUS at 08:25

## 2025-08-01 RX ADMIN — ENOXAPARIN SODIUM 40 MG: 40 INJECTION SUBCUTANEOUS at 08:25

## 2025-08-02 VITALS
SYSTOLIC BLOOD PRESSURE: 117 MMHG | HEIGHT: 72 IN | TEMPERATURE: 97.3 F | DIASTOLIC BLOOD PRESSURE: 69 MMHG | HEART RATE: 65 BPM | RESPIRATION RATE: 18 BRPM | WEIGHT: 241.4 LBS | BODY MASS INDEX: 32.7 KG/M2 | OXYGEN SATURATION: 97 %

## 2025-08-02 LAB
GLUCOSE SERPL-MCNC: 201 MG/DL (ref 65–140)
GLUCOSE SERPL-MCNC: 221 MG/DL (ref 65–140)

## 2025-08-02 PROCEDURE — 82948 REAGENT STRIP/BLOOD GLUCOSE: CPT

## 2025-08-02 PROCEDURE — 99239 HOSP IP/OBS DSCHRG MGMT >30: CPT | Performed by: INTERNAL MEDICINE

## 2025-08-02 RX ORDER — CEPHALEXIN 500 MG/1
500 CAPSULE ORAL 4 TIMES DAILY
Start: 2025-08-02 | End: 2025-08-06

## 2025-08-02 RX ADMIN — LOSARTAN POTASSIUM 50 MG: 50 TABLET, FILM COATED ORAL at 09:56

## 2025-08-02 RX ADMIN — FUROSEMIDE 20 MG: 20 TABLET ORAL at 09:57

## 2025-08-02 RX ADMIN — LEVOTHYROXINE SODIUM 125 MCG: 0.12 TABLET ORAL at 05:36

## 2025-08-02 RX ADMIN — INSULIN LISPRO 2 UNITS: 100 INJECTION, SOLUTION INTRAVENOUS; SUBCUTANEOUS at 09:58

## 2025-08-02 RX ADMIN — INSULIN LISPRO 2 UNITS: 100 INJECTION, SOLUTION INTRAVENOUS; SUBCUTANEOUS at 12:05

## 2025-08-02 RX ADMIN — CEFAZOLIN SODIUM 2000 MG: 2 SOLUTION INTRAVENOUS at 05:36

## 2025-08-02 RX ADMIN — ENOXAPARIN SODIUM 40 MG: 40 INJECTION SUBCUTANEOUS at 09:57

## 2025-08-02 RX ADMIN — CEFAZOLIN SODIUM 2000 MG: 2 SOLUTION INTRAVENOUS at 14:29

## 2025-08-02 RX ADMIN — PANTOPRAZOLE SODIUM 40 MG: 40 TABLET, DELAYED RELEASE ORAL at 09:56

## 2025-08-02 RX ADMIN — INSULIN GLARGINE 20 UNITS: 100 INJECTION, SOLUTION SUBCUTANEOUS at 09:55

## 2025-08-02 RX ADMIN — Medication 2000 UNITS: at 09:56

## 2025-08-02 RX ADMIN — ASPIRIN 81 MG: 81 TABLET, DELAYED RELEASE ORAL at 09:56

## 2025-08-04 LAB
BACTERIA BLD CULT: NORMAL
BACTERIA BLD CULT: NORMAL